# Patient Record
Sex: FEMALE | Race: WHITE | Employment: UNEMPLOYED | URBAN - METROPOLITAN AREA
[De-identification: names, ages, dates, MRNs, and addresses within clinical notes are randomized per-mention and may not be internally consistent; named-entity substitution may affect disease eponyms.]

---

## 2017-01-09 ENCOUNTER — PATIENT MESSAGE (OUTPATIENT)
Dept: INTERNAL MEDICINE CLINIC | Age: 47
End: 2017-01-09

## 2017-01-17 ENCOUNTER — TELEPHONE (OUTPATIENT)
Dept: INTERNAL MEDICINE CLINIC | Age: 47
End: 2017-01-17

## 2017-01-17 NOTE — TELEPHONE ENCOUNTER
Today our office received a request stating that fluticasone 50 mcg nasal spray needs a prior authorization. I called and spoke to rep who informed me that they prefer pt to use OTC fluticasone.

## 2017-01-18 DIAGNOSIS — G47.00 PERSISTENT DISORDER OF INITIATING OR MAINTAINING SLEEP: ICD-10-CM

## 2017-01-18 RX ORDER — ZOLPIDEM TARTRATE 10 MG/1
TABLET ORAL
Qty: 30 TAB | Refills: 0 | Status: SHIPPED | OUTPATIENT
Start: 2017-01-18 | End: 2017-01-27 | Stop reason: SDUPTHER

## 2017-01-18 RX ORDER — ACYCLOVIR 400 MG/1
400 TABLET ORAL
Qty: 30 TAB | Refills: 0 | Status: SHIPPED | OUTPATIENT
Start: 2017-01-18

## 2017-01-18 NOTE — TELEPHONE ENCOUNTER
Medication below has been phoned in to [facility]. zolpidem (AMBIEN) 10 mg tablet [[id number]]   Order Details   Dose, Route, Frequency: As Directed    Dispense Quantity:  30 Tab Refills:  0 Fills Remainin           Sig: Take 1 tab nightly as needed for sleep.  Do not drive after taking Ambien and take it only when you have at least 8 hours dedicated to sleep and rest after taking it.          Written Date:  17 Expiration Date:  --     Start Date:  17 End Date:  --            Ordering Provider:  -- JOON #:  -- NPI:  --    Authorizing Provider:  Radha Roger MD JOON #:  [id number] NPI:  [id number]    Diagnosis Association: Persistent disorder of initiating or maintaining sleep (G47.00)      Original Order:  zolpidem (AMBIEN) 10 mg tablet [[id number]]      Pharmacy:  [facility] [address] [place], [place] [address]vard #:  [id number]

## 2017-01-18 NOTE — TELEPHONE ENCOUNTER
Requested Prescriptions     Pending Prescriptions Disp Refills    acyclovir (ZOVIRAX) 400 mg tablet 30 Tab 0     Sig: Take 1 Tab by mouth five (5) times daily. Indications: as needed    zolpidem (AMBIEN) 10 mg tablet 30 Tab 0     Sig: Take 1 tab nightly as needed for sleep. Do not drive after taking Ambien and take it only when you have at least 8 hours dedicated to sleep and rest after taking it.      Ambien last filled 12/30/16  Acyclovir last filled 11/15/16

## 2017-01-18 NOTE — TELEPHONE ENCOUNTER
From: Steve Velazquez  To:  Jennifer Shabazz MD  Sent: 1/18/2017 3:36 AM EST  Subject: Medication Renewal Request    Original authorizing provider: MD Cinthia Lipscomb would like a refill of the following medications:  acyclovir (ZOVIRAX) 400 mg tablet Jennifer Shabazz MD]  zolpidem (AMBIEN) 10 mg tablet Jennifer Shabazz MD]    Preferred pharmacy: [address] [place] [address] Ravin Pal:

## 2017-01-23 RX ORDER — PREDNISONE 10 MG/1
TABLET ORAL
Qty: 21 TAB | Refills: 0 | Status: SHIPPED | OUTPATIENT
Start: 2017-01-23 | End: 2017-03-24

## 2017-01-27 DIAGNOSIS — G47.00 PERSISTENT DISORDER OF INITIATING OR MAINTAINING SLEEP: ICD-10-CM

## 2017-01-27 RX ORDER — ZOLPIDEM TARTRATE 10 MG/1
TABLET ORAL
Qty: 30 TAB | Refills: 0 | Status: SHIPPED | OUTPATIENT
Start: 2017-01-27 | End: 2017-03-24 | Stop reason: SDUPTHER

## 2017-01-27 NOTE — TELEPHONE ENCOUNTER
Medication below has been phoned in to [facility].     zolpidem (AMBIEN) 10 mg tablet [[id number]]   Order Details   Dose, Route, Frequency: As Directed    Dispense Quantity:  30 Tab Refills:  0 Fills Remainin           Sig: TAKE 1 TABLET BY MOUTH AT BEDTIME AS NEEDED FOR SLEEP          Written Date:  17 Expiration Date:  --     Start Date:  17 End Date:  --            Ordering Provider:  -- JOON #:  -- NPI:  --    Authorizing Provider:  Carlton Carranza MD JOON #:  [id number] NPI:  [id number]    Diagnosis Association: Persistent disorder of initiating or maintaining sleep (G47.00)      Original Order:  zolpidem (AMBIEN) 10 mg tablet [[id number]]      Pharmacy:  [facility] [address] [place] [address] Keshav #:  [id number]

## 2017-01-30 RX ORDER — RIZATRIPTAN BENZOATE 10 MG/1
10 TABLET ORAL
Qty: 4 TAB | Refills: 5 | Status: SHIPPED | OUTPATIENT
Start: 2017-01-30 | End: 2017-03-24

## 2017-01-30 NOTE — TELEPHONE ENCOUNTER
From: Cathy Chawla  To: Lindsey Escalante MD  Sent: 1/28/2017 12:01 AM EST  Subject: Medication Renewal Request    Original authorizing provider: MD Cathy Rice would like a refill of the following medications:  rizatriptan (MAXALT) 10 mg tablet Lindsey Escalante MD]    Preferred pharmacy: [address] [place] [address], [address] [place] [address] Parvez Angella  AT [address] [place] [address]    Comment:      Medication renewals requested in this message routed to other providers:  lorcaserin (BELVIQ) 10 mg tab Tammy Dickerson MD]  topiramate (TOPAMAX) 50 mg tablet Tammy Dickerson MD]  butalbital-acetaminophen-caffeine (FIORICET, ESGIC) -40 mg per tablet Tammy Dickerson MD]  traMADol Jenita Colchester) 50 mg tablet Tammy Dickerson MD]

## 2017-01-31 NOTE — TELEPHONE ENCOUNTER
From: Steve Velazquez  To:  Jennifer Shabazz MD  Sent: 1/30/2017 7:59 PM EST  Subject: Medication Renewal Request    Original authorizing provider: MD Cinthia Lipscomb would like a refill of the following medications:  traMADol (ULTRAM) 50 mg tablet Jennifer Shabazz MD]  diazePAM (VALIUM) 5 mg tablet Jennifer Shabazz MD]    Preferred pharmacy: [facility] Ravin Pal:

## 2017-01-31 NOTE — TELEPHONE ENCOUNTER
Requested Prescriptions     Pending Prescriptions Disp Refills    traMADol (ULTRAM) 50 mg tablet 60 Tab 3     Sig: Take 1 Tab by mouth every eight (8) hours as needed for Pain.  Max Daily Amount: 150 mg.    diazePAM (VALIUM) 5 mg tablet 60 Tab 0     Last OV-1/5/16  Next OV-n/s pt notified to make appt  Med refilled-10/11/16 , 12/6/16

## 2017-02-01 ENCOUNTER — DOCUMENTATION ONLY (OUTPATIENT)
Dept: INTERNAL MEDICINE CLINIC | Age: 47
End: 2017-02-01

## 2017-02-01 RX ORDER — DIAZEPAM 5 MG/1
5 TABLET ORAL
Qty: 60 TAB | Refills: 0 | OUTPATIENT
Start: 2017-02-01

## 2017-02-01 RX ORDER — TRAMADOL HYDROCHLORIDE 50 MG/1
50 TABLET ORAL
Qty: 60 TAB | Refills: 3 | OUTPATIENT
Start: 2017-02-01

## 2017-02-01 NOTE — PROGRESS NOTES
Patient only contacts us for refills of controlled substances. Last seen in Jan 2016, no future appt scheduled. On review of connect care, I see that since 2013 she has had 5 no-shows and numerous cancellations. The tally is much higher when including ancillary services and other specialists within [facility]. We will send termination letter.

## 2017-02-09 RX ORDER — LEVOTHYROXINE SODIUM 88 UG/1
TABLET ORAL
Qty: 30 TAB | Refills: 0 | Status: SHIPPED | OUTPATIENT
Start: 2017-02-09 | End: 2017-04-28 | Stop reason: SDUPTHER

## 2017-02-13 ENCOUNTER — TELEPHONE (OUTPATIENT)
Dept: INTERNAL MEDICINE CLINIC | Age: 47
End: 2017-02-13

## 2017-02-13 RX ORDER — TRAMADOL HYDROCHLORIDE 50 MG/1
50 TABLET ORAL
Qty: 30 TAB | Refills: 0 | Status: SHIPPED | OUTPATIENT
Start: 2017-02-13 | End: 2017-03-24 | Stop reason: SDUPTHER

## 2017-02-13 NOTE — TELEPHONE ENCOUNTER
Patient recently received Termination letter needs to be advised on the status of her refill request from 1/30/17 for her Tramadol. Please call to advise.  Thank you

## 2017-02-13 NOTE — TELEPHONE ENCOUNTER
Medication below has been phoned in to [facility]. traMADol (ULTRAM) 50 mg tablet [[id number]]   Order Details   Dose: 50 mg Route: Oral Frequency: EVERY 8 HOURS AS NEEDED for Pain   Dispense Quantity:  30 Tab Refills:  0 Fills Remainin           Sig: Take 1 Tab by mouth every eight (8) hours as needed for Pain.  Max Daily Amount: 150 mg.          Written Date:  17 Expiration Date:  --     Start Date:  17 End Date:  --            Ordering Provider:  -- JOON #:  -- NPI:  --    Authorizing Provider:  Juancarlos Charlton MD JOON #:  [id number] NPI:  [id number]       Original Order:  traMADol Ute Poag) 50 mg tablet [[id number]]      Pharmacy:  [facility] [address] [place] [address] #:  [id number]     Pharmacy Comments:  --

## 2017-02-28 RX ORDER — DULOXETIN HYDROCHLORIDE 30 MG/1
CAPSULE, DELAYED RELEASE ORAL
Qty: 90 CAP | Refills: 0 | Status: SHIPPED | OUTPATIENT
Start: 2017-02-28 | End: 2017-03-24 | Stop reason: SDUPTHER

## 2017-03-24 ENCOUNTER — OFFICE VISIT (OUTPATIENT)
Dept: INTERNAL MEDICINE CLINIC | Age: 47
End: 2017-03-24

## 2017-03-24 VITALS
SYSTOLIC BLOOD PRESSURE: 120 MMHG | DIASTOLIC BLOOD PRESSURE: 88 MMHG | BODY MASS INDEX: 44.41 KG/M2 | HEIGHT: 68 IN | HEART RATE: 80 BPM | RESPIRATION RATE: 18 BRPM | TEMPERATURE: 98.6 F | WEIGHT: 293 LBS

## 2017-03-24 DIAGNOSIS — E03.4 HYPOTHYROIDISM DUE TO ACQUIRED ATROPHY OF THYROID: ICD-10-CM

## 2017-03-24 DIAGNOSIS — F13.20 BENZODIAZEPINE DEPENDENCE (HCC): ICD-10-CM

## 2017-03-24 DIAGNOSIS — E66.01 MORBID OBESITY DUE TO EXCESS CALORIES (HCC): ICD-10-CM

## 2017-03-24 DIAGNOSIS — G89.29 CHRONIC LOW BACK PAIN WITHOUT SCIATICA, UNSPECIFIED BACK PAIN LATERALITY: ICD-10-CM

## 2017-03-24 DIAGNOSIS — M79.7 FIBROMYALGIA: Primary | ICD-10-CM

## 2017-03-24 DIAGNOSIS — E55.9 VITAMIN D DEFICIENCY: ICD-10-CM

## 2017-03-24 DIAGNOSIS — G47.00 PERSISTENT DISORDER OF INITIATING OR MAINTAINING SLEEP: ICD-10-CM

## 2017-03-24 DIAGNOSIS — Z00.00 WELL ADULT EXAM: ICD-10-CM

## 2017-03-24 DIAGNOSIS — Z79.899 CONTROLLED SUBSTANCE AGREEMENT SIGNED: ICD-10-CM

## 2017-03-24 DIAGNOSIS — M54.50 CHRONIC LOW BACK PAIN WITHOUT SCIATICA, UNSPECIFIED BACK PAIN LATERALITY: ICD-10-CM

## 2017-03-24 DIAGNOSIS — F32.1 MODERATE SINGLE CURRENT EPISODE OF MAJOR DEPRESSIVE DISORDER (HCC): ICD-10-CM

## 2017-03-24 RX ORDER — ZOLPIDEM TARTRATE 10 MG/1
TABLET ORAL
Qty: 30 TAB | Refills: 0 | Status: SHIPPED | OUTPATIENT
Start: 2017-03-24 | End: 2017-04-23 | Stop reason: SDUPTHER

## 2017-03-24 RX ORDER — DULOXETIN HYDROCHLORIDE 60 MG/1
60 CAPSULE, DELAYED RELEASE ORAL DAILY
Qty: 30 CAP | Refills: 3
Start: 2017-03-24

## 2017-03-24 RX ORDER — TRAMADOL HYDROCHLORIDE 50 MG/1
50 TABLET ORAL
Qty: 90 TAB | Refills: 0 | Status: SHIPPED | OUTPATIENT
Start: 2017-03-24 | End: 2017-04-23 | Stop reason: SDUPTHER

## 2017-03-24 RX ORDER — DIAZEPAM 2 MG/1
5 TABLET ORAL
Qty: 60 TAB | Refills: 0 | Status: SHIPPED | OUTPATIENT
Start: 2017-03-24 | End: 2017-04-23 | Stop reason: SDUPTHER

## 2017-03-24 RX ORDER — DULOXETIN HYDROCHLORIDE 30 MG/1
30 CAPSULE, DELAYED RELEASE ORAL DAILY
Qty: 90 CAP | Refills: 0
Start: 2017-03-24 | End: 2020-02-03

## 2017-03-24 NOTE — MR AVS SNAPSHOT
Visit Information Date & Time Provider Department Dept. Phone Encounter #  
 3/24/2017 10:15 AM Shyla Aparicio MD [place] [address] [facility] [phone] [id number] Follow-up Instructions Return in about 4 months (around 7/24/2017) for F/U fibromyalgia. Upcoming Health Maintenance Date Due DTaP/Tdap/Td series (1 - Tdap) 12/8/1993 Allergies as of 3/24/2017  Review Complete On: 3/24/2017 By: Shyla Aparicio MD  
  
 Severity Noted Reaction Type Reactions Augmentin [Amoxicillin-pot Clavulanate]  03/24/2010    Nausea and Vomiting Avelox [Moxifloxacin]  03/24/2010    Other (comments) \"Gave her really bad thrush\" Percocet [Oxycodone-acetaminophen]  03/24/2010    Other (comments) \"Severe Migraines\" Current Immunizations  Never Reviewed Name Date Influenza Vaccine 9/2/2015, 10/29/2014 TB Skin Test (PPD) Intradermal 8/8/2016 Not reviewed this visit You Were Diagnosed With   
  
 Codes Comments Well adult exam    -  Primary ICD-10-CM: Z00.00 ICD-9-CM: V70.0 Hypothyroidism due to acquired atrophy of thyroid     ICD-10-CM: E03.4 ICD-9-CM: 244.8, 246.8 Moderate single current episode of major depressive disorder (HCC)     ICD-10-CM: F32.1 ICD-9-CM: 296.22 Fibromyalgia     ICD-10-CM: M79.7 ICD-9-CM: 729.1 Vitamin D deficiency     ICD-10-CM: E55.9 ICD-9-CM: 268.9 Chronic low back pain without sciatica, unspecified back pain laterality     ICD-10-CM: M54.5, G89.29 ICD-9-CM: 724.2, 338.29 Morbid obesity due to excess calories (HCC)     ICD-10-CM: E66.01 
ICD-9-CM: 278.01 Vitals BP Pulse Temp Resp Height(growth percentile) Weight(growth percentile) 120/88 (BP 1 Location: Left arm, BP Patient Position: Sitting) 80 98.6 °F (37 °C) (Oral) 18 5' 8\" (1.727 m) 297 lb 12.8 oz (135.1 kg) BMI OB Status Smoking Status 45.28 kg/m2 Hysterectomy Former Smoker Vitals History BMI and BSA Data Body Mass Index Body Surface Area  
 45.28 kg/m 2 2.55 m 2 Preferred Pharmacy Pharmacy Name Phone [facility]ath[facility] Millard [address], [address] [facility] [phone] Your Updated Medication List  
  
   
This list is accurate as of: 3/24/17 10:58 AM.  Always use your most recent med list.  
  
  
  
  
 acyclovir 400 mg tablet Commonly known as:  ZOVIRAX Take 1 Tab by mouth five (5) times daily. Indications: as needed * DULoxetine 30 mg capsule Commonly known as:  CYMBALTA Take 1 Cap by mouth daily. * DULoxetine 60 mg capsule Commonly known as:  CYMBALTA Take 1 Cap by mouth daily. ergocalciferol 50,000 unit capsule Commonly known as:  ERGOCALCIFEROL Take 1 Cap by mouth every seven (7) days. esomeprazole 20 mg capsule Commonly known as:  NexIUM Take 1 Cap by mouth daily. fluticasone 50 mcg/actuation nasal spray Commonly known as:  Trish McNairy 2 Sprays by Both Nostrils route daily. furosemide 20 mg tablet Commonly known as:  LASIX Take 1 Tab by mouth daily as needed. Fluid overload/edema  
  
 levothyroxine 88 mcg tablet Commonly known as:  SYNTHROID  
TAKE 1 TABLET BY MOUTH ONCE DAILY BEFORE BREAKFAST  
  
 rizatriptan 10 mg tablet Commonly known as:  Britta Poot Take 1 Tab by mouth once as needed. May repeat in 2 hours if needed  
  
 traMADol 50 mg tablet Commonly known as:  ULTRAM  
Take 1 Tab by mouth every eight (8) hours as needed for Pain. Max Daily Amount: 150 mg.  
  
 zolpidem 10 mg tablet Commonly known as:  AMBIEN  
TAKE 1 TABLET BY MOUTH AT BEDTIME AS NEEDED FOR SLEEP * Notice: This list has 2 medication(s) that are the same as other medications prescribed for you. Read the directions carefully, and ask your doctor or other care provider to review them with you. We Performed the Following CBC WITH AUTOMATED DIFF [31976 CPT(R)] HIV 1/2 AG/AB, 4TH GENERATION,W RFLX CONFIRM [QDH70526 Custom] LIPID PANEL [56069 CPT(R)] METABOLIC PANEL, COMPREHENSIVE [56796 CPT(R)] TSH REFLEX TO T4 [[id number] Custom] URINALYSIS W/ RFLX MICROSCOPIC [57409 CPT(R)] Follow-up Instructions Return in about 4 months (around 7/24/2017) for F/U fibromyalgia. Patient Instructions Follow a calorie controlled Mediterranean diet. Exercise for at least 30 minutes daily. You should try to get off of Ambien and tramadol. Safer alternatives are recommended. Have fasting blood work analysis. Mediterranean Diet: Care Instructions Your Care Instructions The Mediterranean diet features foods eaten in [place], [place], [place] and [place], and other countries that border the [facility]. It emphasizes eating a diet rich in fruits, vegetables, nuts, and high-fiber grains, and limits meat, cheese, and sweets. The Mediterranean diet may: · Prevent heart disease and lower the risk of a heart attack or stroke. · Prevent type 2 diabetes. · Prevent Alzheimer's disease and other dementia. · Prevent depression. · Prevent Parkinson's disease. This diet contains more fat than other heart-healthy diets. But the fats are mainly from nuts, unsaturated oils, such as fish oils, olive oil, and certain nut or seed oils (such as canola, soybean, or flaxseed oil). These types of oils may help protect the heart and blood vessels. Follow-up care is a key part of your treatment and safety. Be sure to make and go to all appointments, and call your doctor if you are having problems. It's also a good idea to know your test results and keep a list of the medicines you take. How can you care for yourself at home? What to eat · Eat a variety of fruits and vegetables each day, such as grapes, blueberries, tomatoes, broccoli, peppers, figs, olives, spinach, eggplant, beans, lentils, and chickpeas. · Eat a variety of whole-grain foods each day, such as oats, brown rice, and whole wheat bread, pasta, and couscous. · Eat fish at least 2 times a week. Try tuna, salmon, mackerel, lake trout, herring, or sardines. · Eat moderate amounts of low-fat dairy products each day or weekly, such as milk, cheese, or yogurt. · Eat moderate amounts of poultry and eggs every 2 days or weekly. · Choose healthy (unsaturated) fats, such as nuts, olive oil and certain nut or seed oils like canola, soybean, and flaxseed. · Limit unhealthy (saturated) fats, such as butter, palm oil, and coconut oil. And limit fats found in animal products, such as meat and dairy products made with whole milk. Try to eat red meat only a few times a month in very small amounts. · Limit sweets and desserts to only a few times a week. This includes sugar-sweetened drinks like soda. The Mediterranean diet may also include red wine with your meal1 glass each day for women and up to 2 glasses a day for men. Tips for changing your diet · Dip bread in a mix of olive oil and fresh herbs instead of using butter. · Add avocado slices to your sandwich instead of simpson. · Have fish for lunch or dinner instead of red meat. Brush the fish with olive oil, and broil or grill it. · Sprinkle your salad with seeds or nuts instead of cheese. · Cook with olive or canola oil instead of butter or oils that are high in saturated fat. · Switch from 2% milk or whole milk to 1% or fat-free milk. · Dip raw vegetables in a vinaigrette dressing or hummus instead of dips made from mayonnaise or sour cream. 
· Have a piece of fruit for dessert instead of a piece of cake. Try baked apples, or have some dried fruit. Part of the Mediterranean diet is being active. Get at least 30 minutes of exercise on most days of the week. Walking is a good choice. You also may want to do other activities, such as running, swimming, cycling, or playing tennis or team sports. Where can you learn more? Go to http://erick-manjula.info/. Enter O407 in the search box to learn more about \"Mediterranean Diet: Care Instructions. \" Current as of: July 26, 2016 Content Version: 11.1 © 4992-4225 [employer]. Care instructions adapted under license by [employer] (which disclaims liability or warranty for this information). If you have questions about a medical condition or this instruction, always ask your healthcare professional. [address] any warranty or liability for your use of this information. Introducing [facility] & HEALTH SERVICES! Dear Esau Amos: Thank you for requesting a [email or web address] account. Our records indicate that you have previously registered for a [email or web address] account but its currently inactive. Please call our [email or web address] support line at [phone]. Additional Information If you have questions, please visit the Frequently Asked Questions section of the [email or web address] website at [email or web address] Remember, [email or web address] is NOT to be used for urgent needs. For medical emergencies, dial 911. Now available from your iPhone and Android! Please provide this summary of care documentation to your next provider. Your primary care clinician is listed as [place]. If you have any questions after today's visit, please call [phone].

## 2017-03-24 NOTE — PROGRESS NOTES
Chief Complaint   Patient presents with    New Patient     Reviewed record in preparation for visit and have obtained necessary documentation. Identified pt with two pt identifiers(name and ). Health Maintenance Due   Topic    DTaP/Tdap/Td series (1 - Tdap)    INFLUENZA AGE 9 TO ADULT          Chief Complaint   Patient presents with    New Patient        Wt Readings from Last 3 Encounters:   17 297 lb 12.8 oz (135.1 kg)   16 291 lb (132 kg)   14 280 lb 3.2 oz (127.1 kg)     Temp Readings from Last 3 Encounters:   17 98.6 °F (37 °C) (Oral)   16 98.1 °F (36.7 °C) (Oral)   14 97.6 °F (36.4 °C) (Oral)     BP Readings from Last 3 Encounters:   17 120/88   16 118/85   14 111/56     Pulse Readings from Last 3 Encounters:   17 80   16 75   14 69           Learning Assessment:  :     Learning Assessment 3/24/2017 6/3/2014   PRIMARY LEARNER Patient Patient   HIGHEST LEVEL OF EDUCATION - PRIMARY LEARNER  SOME COLLEGE SOME COLLEGE   BARRIERS PRIMARY LEARNER NONE NONE   CO-LEARNER CAREGIVER No No   PRIMARY LANGUAGE ENGLISH ENGLISH   LEARNER PREFERENCE PRIMARY DEMONSTRATION READING   ANSWERED BY patient patient   RELATIONSHIP SELF SELF       Depression Screening:  :     No flowsheet data found. Fall Risk Assessment:  :     No flowsheet data found. Abuse Screening:  :     Abuse Screening Questionnaire 3/24/2017 6/3/2014   Do you ever feel afraid of your partner? N N   Are you in a relationship with someone who physically or mentally threatens you? N N   Is it safe for you to go home?  Y Y       Coordination of Care Questionnaire:  :     1) Have you been to an emergency room, urgent care clinic since your last visit? no   Hospitalized since your last visit? no             2) Have you seen or consulted any other health care providers outside of [address] [place] [address] since your last visit? no  (Include any pap smears or colon screenings in this section.)    3) Do you have an Advance Directive on file? no    4) Are you interested in receiving information on Advance Directives? NO      Patient is accompanied by self I have received verbal consent from [id number] [facility] to discuss any/all medical information while they are present in the room. Reviewed record  In preparation for visit and have obtained necessary documentation.

## 2017-03-24 NOTE — PROGRESS NOTES
Subjective:     Chief Complaint   Patient presents with    New Patient     She  is a 42y.o. year old female who presents for evaluation. 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 Has chronic pain and fibromyalgia. Developing neuropathy in left great toe. Has chronic sinusitis and chronic migraines. Has depression and anxiety. Admitted to [facility] after trying to OD. Uses tramadol and Ambien chronically. Has had fullness in her right ear. FH:  MGM with CAD, MGF with DM II with renal disease. Historical Data    Past Medical History:   Diagnosis Date    Aortic aneurysm (thoracic)     Chronic knee pain 3/24/2010    Chronic low back pain 3/24/2010    Chronic pain     Depression 3/24/2010    Fibromyalgia 11/24/2014    Genital herpes 3/24/2010    GERD (gastroesophageal reflux disease) 3/24/2010    H/O endoscopic retrograde cholangiopancreatography [id number]    Hyperlipidemia 3/24/2010    Hypothyroidism 3/24/2010    Migraines 3/24/2010    Obesity 3/24/2010    Persistent disorder of initiating or maintaining sleep 3/24/2010    Plantar fasciitis 3/24/2010        Past Surgical History:   Procedure Laterality Date   Ryan Adamson CHOLECYSTECTOMY  [id number]    HX CHOLECYSTECTOMY  8/2013    emergency cholectomy   Ryan Adamson GYN  [id number]    hysterectomy,endometrial ablation        Outpatient Encounter Prescriptions as of 3/24/2017   Medication Sig Dispense Refill    DULoxetine (CYMBALTA) 30 mg capsule TAKE ONE CAPSULE BY MOUTH EVERY DAY 90 Cap 0    traMADol (ULTRAM) 50 mg tablet Take 1 Tab by mouth every eight (8) hours as needed for Pain. Max Daily Amount: 150 mg. 30 Tab 0    levothyroxine (SYNTHROID) 88 mcg tablet TAKE 1 TABLET BY MOUTH ONCE DAILY BEFORE BREAKFAST 30 Tab 0    rizatriptan (MAXALT) 10 mg tablet Take 1 Tab by mouth once as needed for Migraine for up to 1 dose.  May repeat in 2 hours if needed 4 Tab 5    zolpidem (AMBIEN) 10 mg tablet TAKE 1 TABLET BY MOUTH AT BEDTIME AS NEEDED FOR SLEEP 30 Tab 0    predniSONE (STERAPRED DS) 10 mg dose pack See administration instruction per 10mg dose pack 21 Tab 0    acyclovir (ZOVIRAX) 400 mg tablet Take 1 Tab by mouth five (5) times daily. Indications: as needed 30 Tab 0    diazePAM (VALIUM) 5 mg tablet TAKE 1 TABLET BY MOUTH EVERY 12 HOURS AS NEEDED FOR ANXIETY 60 Tab 0    fluticasone (FLONASE) 50 mcg/actuation nasal spray 2 Sprays by Both Nostrils route daily. 1 Bottle 5    butalbital-acetaminophen-caffeine (FIORICET, ESGIC) -40 mg per tablet Take 1 Tab by mouth every six (6) hours as needed for Pain. Max Daily Amount: 4 Tabs. 30 Tab 2    furosemide (LASIX) 20 mg tablet Take 1 Tab by mouth daily as needed. Fluid overload/edema 30 Tab 5    rizatriptan (MAXALT) 10 mg tablet Take 1 Tab by mouth once as needed. May repeat in 2 hours if needed 12 Tab 6    cyclobenzaprine (FLEXERIL) 10 mg tablet Take 1 Tab by mouth three (3) times daily as needed for Muscle Spasm(s). 30 Tab 5    topiramate (TOPAMAX) 50 mg tablet Take 1 Tab by mouth two (2) times a day. 60 Tab 5    ergocalciferol (ERGOCALCIFEROL) 50,000 unit capsule Take 1 Cap by mouth every seven (7) days. 4 Cap 11    lorcaserin (BELVIQ) 10 mg tab Take 1 Each by mouth two (2) times a day. 60 Tab 0    esomeprazole (NEXIUM) 20 mg capsule Take 1 Cap by mouth daily. 30 Cap 5    levofloxacin (LEVAQUIN) 750 mg tablet Take 750 mg by mouth daily. No facility-administered encounter medications on file as of 3/24/2017. Allergies   Allergen Reactions    Augmentin [Amoxicillin-Pot Clavulanate] Nausea and Vomiting    Avelox [Moxifloxacin] Other (comments)     \"Gave her really bad thrush\"    Percocet [Oxycodone-Acetaminophen] Other (comments)     \"Severe Migraines\"        Social History     Social History    Marital status:      Spouse name: N/A    Number of children: N/A    Years of education: N/A     Occupational History    Not on file. Social History Main Topics    Smoking status: Former Smoker    Smokeless tobacco: Never Used      Comment: quit 9 yrs ago    Alcohol use No      Comment: rarely    Drug use: No    Sexual activity: Yes     Partners: Male     Birth control/ protection: None     Other Topics Concern    Not on file     Social History Narrative      Review of Systems  A comprehensive review of systems was negative except for: Constitutional: positive for malaise  Musculoskeletal: positive for myalgias, arthralgias and back pain  Neurological: positive for headaches  Behvioral/Psych: positive for anxiety, depression and obesity    Objective:     Vitals:    03/24/17 1019   BP: 120/88   Pulse: 80   Resp: 18   Temp: 98.6 °F (37 °C)   TempSrc: Oral   Weight: 297 lb 12.8 oz (135.1 kg)   Height: 5' 8\" (1.727 m)     Pleasant WF in no acute distress. Neck:  Supple. No masses. Cardiac: RRR without murmurs, gallops or rubs. Lungs: Clear to auscultation. Abdomen: Benign  Neuro:  No focal motor deficits. ASSESSMENT / PLAN:   1. Fibromyalgia  · Continue current therapy. · Hope to simplify medications in the future. - DULoxetine (CYMBALTA) 30 mg capsule; Take 1 Cap by mouth daily. Dispense: 90 Cap; Refill: 0  - DULoxetine (CYMBALTA) 60 mg capsule; Take 1 Cap by mouth daily. Dispense: 30 Cap; Refill: 3  - traMADol (ULTRAM) 50 mg tablet; Take 1 Tab by mouth every eight (8) hours as needed for Pain. Max Daily Amount: 150 mg. Dispense: 90 Tab; Refill: 0  - diazePAM (VALIUM) 2 mg tablet; Take 2.5 Tabs by mouth every twelve (12) hours as needed for Anxiety. Max Daily Amount: 10 mg. Dispense: 60 Tab; Refill: 0    2. Hypothyroidism due to acquired atrophy of thyroid    - TSH REFLEX TO T4    3. Moderate single current episode of major depressive disorder (HCC)    - DULoxetine (CYMBALTA) 30 mg capsule; Take 1 Cap by mouth daily. Dispense: 90 Cap; Refill: 0  - DULoxetine (CYMBALTA) 60 mg capsule; Take 1 Cap by mouth daily.   Dispense: 30 Cap; Refill: 3    4. Vitamin D deficiency      5. Chronic low back pain without sciatica, unspecified back pain laterality  · Transition to alternative medication if possible. - traMADol (ULTRAM) 50 mg tablet; Take 1 Tab by mouth every eight (8) hours as needed for Pain. Max Daily Amount: 150 mg. Dispense: 90 Tab; Refill: 0    6. Morbid obesity due to excess calories ([address]      7. Well adult exam    - CBC WITH AUTOMATED DIFF  - LIPID PANEL  - URINALYSIS W/ RFLX MICROSCOPIC  - METABOLIC PANEL, COMPREHENSIVE  - HIV 1/2 AG/AB, 4TH GENERATION,W RFLX CONFIRM    8. Persistent disorder of initiating or maintaining sleep    - zolpidem (AMBIEN) 10 mg tablet; TAKE 1 TABLET BY MOUTH AT BEDTIME AS NEEDED FOR SLEEP  Dispense: 30 Tab; Refill: 0  - diazePAM (VALIUM) 2 mg tablet; Take 2.5 Tabs by mouth every twelve (12) hours as needed for Anxiety. Max Daily Amount: 10 mg. Dispense: 60 Tab; Refill: 0    9. Benzodiazepine dependence (HCC)  · Needs simplification of benzodiazepine regimen. 10. Controlled substance agreement signed      Patient Instructions   Follow a calorie controlled Mediterranean diet. Exercise for at least 30 minutes daily. You should try to get off of Ambien and tramadol. Safer alternatives are recommended. Have fasting blood work analysis. Mediterranean Diet: Care Instructions  Your Care Instructions  The Mediterranean diet features foods eaten in [place], [place], [place] and [place], and other countries that border the [facility]. It emphasizes eating a diet rich in fruits, vegetables, nuts, and high-fiber grains, and limits meat, cheese, and sweets. The Mediterranean diet may:  · Prevent heart disease and lower the risk of a heart attack or stroke. · Prevent type 2 diabetes. · Prevent Alzheimer's disease and other dementia. · Prevent depression. · Prevent Parkinson's disease. This diet contains more fat than other heart-healthy diets.  But the fats are mainly from nuts, unsaturated oils, such as fish oils, olive oil, and certain nut or seed oils (such as canola, soybean, or flaxseed oil). These types of oils may help protect the heart and blood vessels. Follow-up care is a key part of your treatment and safety. Be sure to make and go to all appointments, and call your doctor if you are having problems. It's also a good idea to know your test results and keep a list of the medicines you take. How can you care for yourself at home? What to eat  · Eat a variety of fruits and vegetables each day, such as grapes, blueberries, tomatoes, broccoli, peppers, figs, olives, spinach, eggplant, beans, lentils, and chickpeas. · Eat a variety of whole-grain foods each day, such as oats, brown rice, and whole wheat bread, pasta, and couscous. · Eat fish at least 2 times a week. Try tuna, salmon, mackerel, lake trout, herring, or sardines. · Eat moderate amounts of low-fat dairy products each day or weekly, such as milk, cheese, or yogurt. · Eat moderate amounts of poultry and eggs every 2 days or weekly. · Choose healthy (unsaturated) fats, such as nuts, olive oil and certain nut or seed oils like canola, soybean, and flaxseed. · Limit unhealthy (saturated) fats, such as butter, palm oil, and coconut oil. And limit fats found in animal products, such as meat and dairy products made with whole milk. Try to eat red meat only a few times a month in very small amounts. · Limit sweets and desserts to only a few times a week. This includes sugar-sweetened drinks like soda. The Mediterranean diet may also include red wine with your meal--1 glass each day for women and up to 2 glasses a day for men. Tips for changing your diet  · Dip bread in a mix of olive oil and fresh herbs instead of using butter. · Add avocado slices to your sandwich instead of simpson. · Have fish for lunch or dinner instead of red meat. Brush the fish with olive oil, and broil or grill it.   · Sprinkle your salad with seeds or nuts instead of cheese. · Cook with olive or canola oil instead of butter or oils that are high in saturated fat. · Switch from 2% milk or whole milk to 1% or fat-free milk. · Dip raw vegetables in a vinaigrette dressing or hummus instead of dips made from mayonnaise or sour cream.  · Have a piece of fruit for dessert instead of a piece of cake. Try baked apples, or have some dried fruit. Part of the Mediterranean diet is being active. Get at least 30 minutes of exercise on most days of the week. Walking is a good choice. You also may want to do other activities, such as running, swimming, cycling, or playing tennis or team sports. Where can you learn more? Go to http://erick-manjula.info/. Enter O407 in the search box to learn more about \"Mediterranean Diet: Care Instructions. \"  Current as of: July 26, 2016  Content Version: 11.1  © 1537-1117 [employer]. Care instructions adapted under license by [employer] (which disclaims liability or warranty for this information). If you have questions about a medical condition or this instruction, always ask your healthcare professional. [place] [address] any warranty or liability for your use of this information. Follow-up Disposition:  Return in about 4 months (around 7/24/2017) for F/U fibromyalgia. Advised her to call back or return to office if symptoms worsen/change/persist.  Discussed expected course/resolution/complications of diagnosis in detail with patient. Medication risks/benefits/costs/interactions/alternatives discussed with patient. She was given an after visit summary which includes diagnoses, current medications, & vitals. She expressed understanding with the diagnosis and plan.

## 2017-03-24 NOTE — PATIENT INSTRUCTIONS
Follow a calorie controlled Mediterranean diet. Exercise for at least 30 minutes daily. You should try to get off of Ambien and tramadol. Safer alternatives are recommended. Have fasting blood work analysis. Mediterranean Diet: Care Instructions  Your Care Instructions  The Mediterranean diet features foods eaten in [place], [place], [place] and [place], and other countries that border the [facility]. It emphasizes eating a diet rich in fruits, vegetables, nuts, and high-fiber grains, and limits meat, cheese, and sweets. The Mediterranean diet may:  · Prevent heart disease and lower the risk of a heart attack or stroke. · Prevent type 2 diabetes. · Prevent Alzheimer's disease and other dementia. · Prevent depression. · Prevent Parkinson's disease. This diet contains more fat than other heart-healthy diets. But the fats are mainly from nuts, unsaturated oils, such as fish oils, olive oil, and certain nut or seed oils (such as canola, soybean, or flaxseed oil). These types of oils may help protect the heart and blood vessels. Follow-up care is a key part of your treatment and safety. Be sure to make and go to all appointments, and call your doctor if you are having problems. It's also a good idea to know your test results and keep a list of the medicines you take. How can you care for yourself at home? What to eat  · Eat a variety of fruits and vegetables each day, such as grapes, blueberries, tomatoes, broccoli, peppers, figs, olives, spinach, eggplant, beans, lentils, and chickpeas. · Eat a variety of whole-grain foods each day, such as oats, brown rice, and whole wheat bread, pasta, and couscous. · Eat fish at least 2 times a week. Try tuna, salmon, mackerel, lake trout, herring, or sardines. · Eat moderate amounts of low-fat dairy products each day or weekly, such as milk, cheese, or yogurt. · Eat moderate amounts of poultry and eggs every 2 days or weekly.   · Choose healthy (unsaturated) fats, such as nuts, olive oil and certain nut or seed oils like canola, soybean, and flaxseed. · Limit unhealthy (saturated) fats, such as butter, palm oil, and coconut oil. And limit fats found in animal products, such as meat and dairy products made with whole milk. Try to eat red meat only a few times a month in very small amounts. · Limit sweets and desserts to only a few times a week. This includes sugar-sweetened drinks like soda. The Mediterranean diet may also include red wine with your meal--1 glass each day for women and up to 2 glasses a day for men. Tips for changing your diet  · Dip bread in a mix of olive oil and fresh herbs instead of using butter. · Add avocado slices to your sandwich instead of simpson. · Have fish for lunch or dinner instead of red meat. Brush the fish with olive oil, and broil or grill it. · Sprinkle your salad with seeds or nuts instead of cheese. · Cook with olive or canola oil instead of butter or oils that are high in saturated fat. · Switch from 2% milk or whole milk to 1% or fat-free milk. · Dip raw vegetables in a vinaigrette dressing or hummus instead of dips made from mayonnaise or sour cream.  · Have a piece of fruit for dessert instead of a piece of cake. Try baked apples, or have some dried fruit. Part of the Mediterranean diet is being active. Get at least 30 minutes of exercise on most days of the week. Walking is a good choice. You also may want to do other activities, such as running, swimming, cycling, or playing tennis or team sports. Where can you learn more? Go to http://erick-manjula.info/. Enter O407 in the search box to learn more about \"Mediterranean Diet: Care Instructions. \"  Current as of: July 26, 2016  Content Version: 11.1  © 1856-3745 [employer]. Care instructions adapted under license by [employer] (which disclaims liability or warranty for this information).  If you have questions about a medical condition or this instruction, always ask your healthcare professional. [place] [address] any warranty or liability for your use of this information.

## 2017-03-25 LAB
ALBUMIN SERPL-MCNC: 4.1 G/DL (ref 3.5–5.5)
ALBUMIN/GLOB SERPL: 1.7 {RATIO} (ref 1.2–2.2)
ALP SERPL-CCNC: 67 IU/L (ref 39–117)
ALT SERPL-CCNC: 12 IU/L (ref 0–32)
APPEARANCE UR: CLEAR
AST SERPL-CCNC: 14 IU/L (ref 0–40)
BACTERIA #/AREA URNS HPF: NORMAL /[HPF]
BASOPHILS # BLD AUTO: 0 X10E3/UL (ref 0–0.2)
BASOPHILS NFR BLD AUTO: 1 %
BILIRUB SERPL-MCNC: 0.3 MG/DL (ref 0–1.2)
BILIRUB UR QL STRIP: NEGATIVE
BUN SERPL-MCNC: 13 MG/DL (ref 6–24)
BUN/CREAT SERPL: 14 (ref 9–23)
CALCIUM SERPL-MCNC: 9.1 MG/DL (ref 8.7–10.2)
CASTS URNS QL MICRO: NORMAL /LPF
CHLORIDE SERPL-SCNC: 103 MMOL/L (ref 96–106)
CHOLEST SERPL-MCNC: 190 MG/DL (ref 100–199)
CO2 SERPL-SCNC: 25 MMOL/L (ref 18–29)
COLOR UR: YELLOW
CREAT SERPL-MCNC: 0.91 MG/DL (ref 0.57–1)
EOSINOPHIL # BLD AUTO: 0.3 X10E3/UL (ref 0–0.4)
EOSINOPHIL NFR BLD AUTO: 4 %
EPI CELLS #/AREA URNS HPF: NORMAL /HPF
ERYTHROCYTE [DISTWIDTH] IN BLOOD BY AUTOMATED COUNT: 15.6 % (ref 12.3–15.4)
GLOBULIN SER CALC-MCNC: 2.4 G/DL (ref 1.5–4.5)
GLUCOSE SERPL-MCNC: 94 MG/DL (ref 65–99)
GLUCOSE UR QL: NEGATIVE
HCT VFR BLD AUTO: 37.5 % (ref 34–46.6)
HDLC SERPL-MCNC: 49 MG/DL
HGB BLD-MCNC: 11.6 G/DL (ref 11.1–15.9)
HGB UR QL STRIP: NEGATIVE
HIV 1+2 AB+HIV1 P24 AG SERPL QL IA: NON REACTIVE
IMM GRANULOCYTES # BLD: 0 X10E3/UL (ref 0–0.1)
IMM GRANULOCYTES NFR BLD: 0 %
INTERPRETATION, 910389: NORMAL
KETONES UR QL STRIP: NEGATIVE
LDLC SERPL CALC-MCNC: 124 MG/DL (ref 0–99)
LEUKOCYTE ESTERASE UR QL STRIP: ABNORMAL
LYMPHOCYTES # BLD AUTO: 2.3 X10E3/UL (ref 0.7–3.1)
LYMPHOCYTES NFR BLD AUTO: 36 %
MCH RBC QN AUTO: 27.2 PG (ref 26.6–33)
MCHC RBC AUTO-ENTMCNC: 30.9 G/DL (ref 31.5–35.7)
MCV RBC AUTO: 88 FL (ref 79–97)
MICRO URNS: ABNORMAL
MONOCYTES # BLD AUTO: 0.3 X10E3/UL (ref 0.1–0.9)
MONOCYTES NFR BLD AUTO: 5 %
MUCOUS THREADS URNS QL MICRO: PRESENT
NEUTROPHILS # BLD AUTO: 3.5 X10E3/UL (ref 1.4–7)
NEUTROPHILS NFR BLD AUTO: 54 %
NITRITE UR QL STRIP: NEGATIVE
PH UR STRIP: 5.5 [PH] (ref 5–7.5)
PLATELET # BLD AUTO: 381 X10E3/UL (ref 150–379)
POTASSIUM SERPL-SCNC: 4.6 MMOL/L (ref 3.5–5.2)
PROT SERPL-MCNC: 6.5 G/DL (ref 6–8.5)
PROT UR QL STRIP: NEGATIVE
RBC # BLD AUTO: 4.26 X10E6/UL (ref 3.77–5.28)
RBC #/AREA URNS HPF: NORMAL /HPF
SODIUM SERPL-SCNC: 141 MMOL/L (ref 134–144)
SP GR UR: 1.03 (ref 1–1.03)
TRIGL SERPL-MCNC: 84 MG/DL (ref 0–149)
TSH SERPL DL<=0.005 MIU/L-ACNC: 3.19 UIU/ML (ref 0.45–4.5)
UROBILINOGEN UR STRIP-MCNC: 0.2 MG/DL (ref 0.2–1)
VLDLC SERPL CALC-MCNC: 17 MG/DL (ref 5–40)
WBC # BLD AUTO: 6.4 X10E3/UL (ref 3.4–10.8)
WBC #/AREA URNS HPF: NORMAL /HPF

## 2017-04-04 ENCOUNTER — OFFICE VISIT (OUTPATIENT)
Dept: INTERNAL MEDICINE CLINIC | Age: 47
End: 2017-04-04

## 2017-04-04 VITALS
SYSTOLIC BLOOD PRESSURE: 120 MMHG | OXYGEN SATURATION: 97 % | HEIGHT: 68 IN | TEMPERATURE: 98.4 F | DIASTOLIC BLOOD PRESSURE: 84 MMHG | WEIGHT: 292 LBS | HEART RATE: 74 BPM | BODY MASS INDEX: 44.25 KG/M2 | RESPIRATION RATE: 18 BRPM

## 2017-04-04 DIAGNOSIS — N30.00 ACUTE CYSTITIS WITHOUT HEMATURIA: ICD-10-CM

## 2017-04-04 DIAGNOSIS — R30.0 DYSURIA: ICD-10-CM

## 2017-04-04 DIAGNOSIS — R35.0 URINARY FREQUENCY: Primary | ICD-10-CM

## 2017-04-04 LAB
BILIRUB UR QL STRIP: NORMAL
GLUCOSE UR-MCNC: NEGATIVE MG/DL
KETONES P FAST UR STRIP-MCNC: NEGATIVE MG/DL
PH UR STRIP: 5.5 [PH] (ref 4.6–8)
PROT UR QL STRIP: NEGATIVE MG/DL
SP GR UR STRIP: 1.02 (ref 1–1.03)
UA UROBILINOGEN AMB POC: NORMAL (ref 0.2–1)
URINALYSIS CLARITY POC: CLEAR
URINALYSIS COLOR POC: YELLOW
URINE BLOOD POC: NEGATIVE
URINE LEUKOCYTES POC: NORMAL
URINE NITRITES POC: NEGATIVE

## 2017-04-04 RX ORDER — SULFAMETHOXAZOLE AND TRIMETHOPRIM 800; 160 MG/1; MG/1
1 TABLET ORAL 2 TIMES DAILY
Qty: 6 TAB | Refills: 0 | Status: SHIPPED | OUTPATIENT
Start: 2017-04-04 | End: 2017-04-07

## 2017-04-04 NOTE — PROGRESS NOTES
Chief Complaint   Patient presents with    Urinary Frequency     3 days    Urinary Burning     Reviewed record in preparation for visit and have obtained necessary documentation. Identified pt with two pt identifiers(name and ). There are no preventive care reminders to display for this patient. Chief Complaint   Patient presents with    Urinary Frequency     3 days    Urinary Burning        Wt Readings from Last 3 Encounters:   17 292 lb (132.5 kg)   17 297 lb 12.8 oz (135.1 kg)   16 291 lb (132 kg)     Temp Readings from Last 3 Encounters:   17 98.4 °F (36.9 °C) (Oral)   17 98.6 °F (37 °C) (Oral)   16 98.1 °F (36.7 °C) (Oral)     BP Readings from Last 3 Encounters:   17 120/84   17 120/88   16 118/85     Pulse Readings from Last 3 Encounters:   17 74   17 80   16 75           Learning Assessment:  :     Learning Assessment 3/24/2017 6/3/2014   PRIMARY LEARNER Patient Patient   HIGHEST LEVEL OF EDUCATION - PRIMARY LEARNER  SOME COLLEGE SOME COLLEGE   BARRIERS PRIMARY LEARNER NONE NONE   CO-LEARNER CAREGIVER No No   PRIMARY LANGUAGE ENGLISH ENGLISH   LEARNER PREFERENCE PRIMARY DEMONSTRATION READING   ANSWERED BY patient patient   RELATIONSHIP SELF SELF       Depression Screening:  :     No flowsheet data found. Fall Risk Assessment:  :     No flowsheet data found. Abuse Screening:  :     Abuse Screening Questionnaire 3/24/2017 6/3/2014   Do you ever feel afraid of your partner? N N   Are you in a relationship with someone who physically or mentally threatens you? N N   Is it safe for you to go home?  Y Y       Coordination of Care Questionnaire:  :     1) Have you been to an emergency room, urgent care clinic since your last visit? no   Hospitalized since your last visit? no             2) Have you seen or consulted any other health care providers outside of [address] [place] [address] since your last visit? no  (Include any pap smears or colon screenings in this section.)    3) Do you have an Advance Directive on file? yes    4) Are you interested in receiving information on Advance Directives? NO      Patient is accompanied by daughter I have received verbal consent from Katlyn Collins to discuss any/all medical information while they are present in the room. Reviewed record  In preparation for visit and have obtained necessary documentation.

## 2017-04-04 NOTE — PATIENT INSTRUCTIONS
Urinary Tract Infection in Women: Care Instructions  Your Care Instructions    A urinary tract infection, or UTI, is a general term for an infection anywhere between the kidneys and the urethra (where urine comes out). Most UTIs are bladder infections. They often cause pain or burning when you urinate. UTIs are caused by bacteria and can be cured with antibiotics. Be sure to complete your treatment so that the infection goes away. Follow-up care is a key part of your treatment and safety. Be sure to make and go to all appointments, and call your doctor if you are having problems. It's also a good idea to know your test results and keep a list of the medicines you take. How can you care for yourself at home? · Take your antibiotics as directed. Do not stop taking them just because you feel better. You need to take the full course of antibiotics. · Drink extra water and other fluids for the next day or two. This may help wash out the bacteria that are causing the infection. (If you have kidney, heart, or liver disease and have to limit fluids, talk with your doctor before you increase your fluid intake.)  · Avoid drinks that are carbonated or have caffeine. They can irritate the bladder. · Urinate often. Try to empty your bladder each time. · To relieve pain, take a hot bath or lay a heating pad set on low over your lower belly or genital area. Never go to sleep with a heating pad in place. To prevent UTIs  · Drink plenty of water each day. This helps you urinate often, which clears bacteria from your system. (If you have kidney, heart, or liver disease and have to limit fluids, talk with your doctor before you increase your fluid intake.)  · Urinate when you need to. · Urinate right after you have sex. · Change sanitary pads often. · Avoid douches, bubble baths, feminine hygiene sprays, and other feminine hygiene products that have deodorants.   · After going to the bathroom, wipe from front to back.  When should you call for help? Call your doctor now or seek immediate medical care if:  · Symptoms such as fever, chills, nausea, or vomiting get worse or appear for the first time. · You have new pain in your back just below your rib cage. This is called flank pain. · There is new blood or pus in your urine. · You have any problems with your antibiotic medicine. Watch closely for changes in your health, and be sure to contact your doctor if:  · You are not getting better after taking an antibiotic for 2 days. · Your symptoms go away but then come back. Where can you learn more? Go to http://erick-manjula.info/. Enter C121 in the search box to learn more about \"Urinary Tract Infection in Women: Care Instructions. \"  Current as of: November 28, 2016  Content Version: 11.2  © 3251-5823 [employer]. Care instructions adapted under license by [employer] (which disclaims liability or warranty for this information). If you have questions about a medical condition or this instruction, always ask your healthcare professional. [address] any warranty or liability for your use of this information.

## 2017-04-04 NOTE — PROGRESS NOTES
Subjective:     Emily Galindo is a 42 y.o. female who complains of dysuria, frequency, urgency, foul smelling urine, nausea for 3 days. She also reports fever yesterday and slight thin white discharge. Patient denies flank pain, vomiting. Patient does not have a history of recurrent UTI. Patient does not have a history of pyelonephritis. Patient Active Problem List   Diagnosis Code    Hypothyroidism E03.9    Depression F32.9    Persistent disorder of initiating or maintaining sleep G47.00    Migraines G43.909    GERD (gastroesophageal reflux disease) K21.9    Obesity E66.9    Hyperlipidemia E78.5    Chronic low back pain M54.5, G89.29    Genital herpes A60.00    Aortic aneurysm (thoracic)     Vitamin D deficiency E55.9    Fibromyalgia M79.7    Benzodiazepine dependence ([facility]) F13.20    Controlled substance agreement signed Z79.899     Patient Active Problem List    Diagnosis Date Noted    Benzodiazepine dependence ([facility] [address] 03/24/2017    Controlled substance agreement signed 03/24/2017    Fibromyalgia 11/24/2014    Vitamin D deficiency 10/28/2011    Hypothyroidism 03/24/2010    Depression 03/24/2010    Persistent disorder of initiating or maintaining sleep 03/24/2010    Migraines 03/24/2010    GERD (gastroesophageal reflux disease) 03/24/2010    Obesity 03/24/2010    Hyperlipidemia 03/24/2010    Chronic low back pain 03/24/2010    Genital herpes 03/24/2010    Aortic aneurysm (thoracic)      Current Outpatient Prescriptions   Medication Sig Dispense Refill    trimethoprim-sulfamethoxazole (BACTRIM DS, SEPTRA DS) 160-800 mg per tablet Take 1 Tab by mouth two (2) times a day for 3 days. 6 Tab 0    DULoxetine (CYMBALTA) 30 mg capsule Take 1 Cap by mouth daily. 90 Cap 0    DULoxetine (CYMBALTA) 60 mg capsule Take 1 Cap by mouth daily.  30 Cap 3    zolpidem (AMBIEN) 10 mg tablet TAKE 1 TABLET BY MOUTH AT BEDTIME AS NEEDED FOR SLEEP 30 Tab 0    traMADol (ULTRAM) 50 mg tablet Take 1 Tab by mouth every eight (8) hours as needed for Pain. Max Daily Amount: 150 mg. 90 Tab 0    diazePAM (VALIUM) 2 mg tablet Take 2.5 Tabs by mouth every twelve (12) hours as needed for Anxiety. Max Daily Amount: 10 mg. 60 Tab 0    levothyroxine (SYNTHROID) 88 mcg tablet TAKE 1 TABLET BY MOUTH ONCE DAILY BEFORE BREAKFAST 30 Tab 0    acyclovir (ZOVIRAX) 400 mg tablet Take 1 Tab by mouth five (5) times daily. Indications: as needed 30 Tab 0    fluticasone (FLONASE) 50 mcg/actuation nasal spray 2 Sprays by Both Nostrils route daily. 1 Bottle 5    furosemide (LASIX) 20 mg tablet Take 1 Tab by mouth daily as needed. Fluid overload/edema 30 Tab 5    ergocalciferol (ERGOCALCIFEROL) 50,000 unit capsule Take 1 Cap by mouth every seven (7) days. 4 Cap 11    esomeprazole (NEXIUM) 20 mg capsule Take 1 Cap by mouth daily. 30 Cap 5    rizatriptan (MAXALT) 10 mg tablet Take 1 Tab by mouth once as needed.  May repeat in 2 hours if needed 12 Tab 6     Allergies   Allergen Reactions    Augmentin [Amoxicillin-Pot Clavulanate] Nausea and Vomiting    Avelox [Moxifloxacin] Other (comments)     \"Gave her really bad thrush\"    Percocet [Oxycodone-Acetaminophen] Other (comments)     \"Severe Migraines\"     Past Medical History:   Diagnosis Date    Aortic aneurysm (thoracic)     Chronic knee pain 3/24/2010    Chronic low back pain 3/24/2010    Chronic pain     Depression 3/24/2010    Fibromyalgia 11/24/2014    Genital herpes 3/24/2010    GERD (gastroesophageal reflux disease) 3/24/2010    H/O endoscopic retrograde cholangiopancreatography [id number]    Hyperlipidemia 3/24/2010    Hypothyroidism 3/24/2010    Migraines 3/24/2010    Obesity 3/24/2010    Persistent disorder of initiating or maintaining sleep 3/24/2010    Plantar fasciitis 3/24/2010     Past Surgical History:   Procedure Laterality Date   [facility] CHOLECYSTECTOMY  [id number]   [facility] CHOLECYSTECTOMY  8/2013    emergency cholectomy   Debera Pancoast GYN  [phone] hysterectomy,endometrial ablation     No family history on file. Social History   Substance Use Topics    Smoking status: Former Smoker    Smokeless tobacco: Never Used      Comment: quit 9 yrs ago    Alcohol use No      Comment: rarely        Review of Systems  Pertinent items are noted in HPI. Objective:     Visit Vitals    /84 (BP 1 Location: Left arm, BP Patient Position: Sitting)    Pulse 74    Temp 98.4 °F (36.9 °C) (Oral)    Resp 18    Ht 5' 8\" (1.727 m)    Wt 292 lb (132.5 kg)    SpO2 97%    BMI 44.4 kg/m2     General:  alert, cooperative, no distress   Abdomen: soft, nontender, nondistended, no masses or organomegaly. Back:  CVA tenderness absent   :  defer exam     Laboratory:   Urine dipstick shows positive for leukocytes. Micro exam: not done. Assessment/Plan:     UTI, Acute cystitis     1. TMP/SMX  2. Maintain adequate hydration  3. May use OTC pyridium as desired, which will turn urine orange/red color  4. Follow up if symptoms not improving, and prn. ICD-10-CM ICD-9-CM    1. Urinary frequency R35.0 788.41 AMB POC URINALYSIS DIP STICK AUTO W/O MICRO      CULTURE, URINE      CANCELED: AMB POC URINALYSIS DIP STICK AUTO W/O MICRO   2. Dysuria R30.0 788. 1 CULTURE, URINE   3. Acute cystitis without hematuria N30.00 595.0      Orders Placed This Encounter    CULTURE, URINE    AMB POC URINALYSIS DIP STICK AUTO W/O MICRO    trimethoprim-sulfamethoxazole (BACTRIM DS, SEPTRA DS) 160-800 mg per tablet   .

## 2017-04-04 NOTE — MR AVS SNAPSHOT
Visit Information Date & Time Provider Department Dept. Phone Encounter #  
 4/4/2017  2:15 PM [phone] Your Appointments 7/27/2017  3:00 PM  
ROUTINE CARE with Larene Opitz, MD [address] [facility] [address] [facility]) Appt Note: 4m fu for F/U fibromyalgia  
 [address], [address] [place]  
[address], [place] [address] [phone] Upcoming Health Maintenance Date Due DTaP/Tdap/Td series (1 - Tdap) 6/30/2017* *Topic was postponed. The date shown is not the original due date. Allergies as of 4/4/2017  Review Complete On: 4/4/2017 By: Franc Howard NP Severity Noted Reaction Type Reactions Augmentin [Amoxicillin-pot Clavulanate]  03/24/2010    Nausea and Vomiting Avelox [Moxifloxacin]  03/24/2010    Other (comments) \"Gave her really bad thrush\" Percocet [Oxycodone-acetaminophen]  03/24/2010    Other (comments) \"Severe Migraines\" Current Immunizations  Never Reviewed Name Date Influenza Vaccine 9/2/2015, 10/29/2014 TB Skin Test (PPD) Intradermal 8/8/2016 Not reviewed this visit You Were Diagnosed With   
  
 Codes Comments Urinary frequency    -  Primary ICD-10-CM: R35.0 ICD-9-CM: 788.41 Dysuria     ICD-10-CM: R30.0 ICD-9-CM: 076. 1 Acute cystitis without hematuria     ICD-10-CM: N30.00 ICD-9-CM: 595.0 Vitals BP Pulse Temp Resp Height(growth percentile) Weight(growth percentile) 120/84 (BP 1 Location: Left arm, BP Patient Position: Sitting) 74 98.4 °F (36.9 °C) (Oral) 18 5' 8\" (1.727 m) 292 lb (132.5 kg) SpO2 BMI OB Status Smoking Status 97% 44.4 kg/m2 Hysterectomy Former Smoker Vitals History BMI and BSA Data Body Mass Index Body Surface Area 44.4 kg/m 2 2.52 m 2 Preferred Pharmacy Pharmacy Name Phone [facility] 52 [phone] [facility], [address] [place] [address] [phone] Your Updated Medication List  
  
   
This list is accurate as of: 4/4/17  2:19 PM.  Always use your most recent med list.  
  
  
  
  
 acyclovir 400 mg tablet Commonly known as:  ZOVIRAX Take 1 Tab by mouth five (5) times daily. Indications: as needed  
  
 diazePAM 2 mg tablet Commonly known as:  VALIUM Take 2.5 Tabs by mouth every twelve (12) hours as needed for Anxiety. Max Daily Amount: 10 mg.  
  
 * DULoxetine 30 mg capsule Commonly known as:  CYMBALTA Take 1 Cap by mouth daily. * DULoxetine 60 mg capsule Commonly known as:  CYMBALTA Take 1 Cap by mouth daily. ergocalciferol 50,000 unit capsule Commonly known as:  ERGOCALCIFEROL Take 1 Cap by mouth every seven (7) days. esomeprazole 20 mg capsule Commonly known as:  NexIUM Take 1 Cap by mouth daily. fluticasone 50 mcg/actuation nasal spray Commonly known as:  Dylan Settles 2 Sprays by Both Nostrils route daily. furosemide 20 mg tablet Commonly known as:  LASIX Take 1 Tab by mouth daily as needed. Fluid overload/edema  
  
 levothyroxine 88 mcg tablet Commonly known as:  SYNTHROID  
TAKE 1 TABLET BY MOUTH ONCE DAILY BEFORE BREAKFAST  
  
 rizatriptan 10 mg tablet Commonly known as:  [place] Take 1 Tab by mouth once as needed. May repeat in 2 hours if needed  
  
 traMADol 50 mg tablet Commonly known as:  ULTRAM  
Take 1 Tab by mouth every eight (8) hours as needed for Pain. Max Daily Amount: 150 mg.  
  
 trimethoprim-sulfamethoxazole 160-800 mg per tablet Commonly known as:  BACTRIM DS, SEPTRA DS Take 1 Tab by mouth two (2) times a day for 3 days. zolpidem 10 mg tablet Commonly known as:  AMBIEN  
TAKE 1 TABLET BY MOUTH AT BEDTIME AS NEEDED FOR SLEEP * Notice:   This list has 2 medication(s) that are the same as other medications prescribed for you. Read the directions carefully, and ask your doctor or other care provider to review them with you. Prescriptions Sent to Pharmacy Refills  
 trimethoprim-sulfamethoxazole (BACTRIM DS, SEPTRA DS) 160-800 mg per tablet 0 Sig: Take 1 Tab by mouth two (2) times a day for 3 days. Class: Normal  
 Pharmacy: [facility]  [address] #: [phone] Route: Oral  
  
We Performed the Following AMB POC URINALYSIS DIP STICK AUTO W/O MICRO [74010 CPT(R)] Patient Instructions Urinary Tract Infection in Women: Care Instructions Your Care Instructions A urinary tract infection, or UTI, is a general term for an infection anywhere between the kidneys and the urethra (where urine comes out). Most UTIs are bladder infections. They often cause pain or burning when you urinate. UTIs are caused by bacteria and can be cured with antibiotics. Be sure to complete your treatment so that the infection goes away. Follow-up care is a key part of your treatment and safety. Be sure to make and go to all appointments, and call your doctor if you are having problems. It's also a good idea to know your test results and keep a list of the medicines you take. How can you care for yourself at home? · Take your antibiotics as directed. Do not stop taking them just because you feel better. You need to take the full course of antibiotics. · Drink extra water and other fluids for the next day or two. This may help wash out the bacteria that are causing the infection. (If you have kidney, heart, or liver disease and have to limit fluids, talk with your doctor before you increase your fluid intake.) · Avoid drinks that are carbonated or have caffeine. They can irritate the bladder. · Urinate often. Try to empty your bladder each time.  
· To relieve pain, take a hot bath or lay a heating pad set on low over your lower belly or genital area. Never go to sleep with a heating pad in place. To prevent UTIs · Drink plenty of water each day. This helps you urinate often, which clears bacteria from your system. (If you have kidney, heart, or liver disease and have to limit fluids, talk with your doctor before you increase your fluid intake.) · Urinate when you need to. · Urinate right after you have sex. · Change sanitary pads often. · Avoid douches, bubble baths, feminine hygiene sprays, and other feminine hygiene products that have deodorants. · After going to the bathroom, wipe from front to back. When should you call for help? Call your doctor now or seek immediate medical care if: · Symptoms such as fever, chills, nausea, or vomiting get worse or appear for the first time. · You have new pain in your back just below your rib cage. This is called flank pain. · There is new blood or pus in your urine. · You have any problems with your antibiotic medicine. Watch closely for changes in your health, and be sure to contact your doctor if: 
· You are not getting better after taking an antibiotic for 2 days. · Your symptoms go away but then come back. Where can you learn more? Go to http://erick-manjula.info/. Enter F034 in the search box to learn more about \"Urinary Tract Infection in Women: Care Instructions. \" Current as of: November 28, 2016 Content Version: 11.2 © 8716-4199 [employer]. Care instructions adapted under license by [employer] (which disclaims liability or warranty for this information). If you have questions about a medical condition or this instruction, always ask your healthcare professional. [place] [address] any warranty or liability for your use of this information. Introducing [facility]! Dear Pato Villafuerte: Thank you for requesting a [employer] account.   Our records indicate that you have previously registered for a [email or web address] account but its currently inactive. Please call our [email or web address] support line at [phone]. Additional Information If you have questions, please visit the Frequently Asked Questions section of the [email or web address] website at [email or web address] Remember, [email or web address] is NOT to be used for urgent needs. For medical emergencies, dial 911. Now available from your iPhone and Android! Please provide this summary of care documentation to your next provider. Your primary care clinician is listed as Apollo Ramirez. If you have any questions after today's visit, please call [phone].

## 2017-04-06 LAB — BACTERIA UR CULT: NORMAL

## 2017-04-11 ENCOUNTER — TELEPHONE (OUTPATIENT)
Dept: INTERNAL MEDICINE CLINIC | Age: 47
End: 2017-04-11

## 2017-04-23 DIAGNOSIS — G47.00 PERSISTENT DISORDER OF INITIATING OR MAINTAINING SLEEP: ICD-10-CM

## 2017-04-23 DIAGNOSIS — M54.50 CHRONIC LOW BACK PAIN WITHOUT SCIATICA, UNSPECIFIED BACK PAIN LATERALITY: ICD-10-CM

## 2017-04-23 DIAGNOSIS — G89.29 CHRONIC LOW BACK PAIN WITHOUT SCIATICA, UNSPECIFIED BACK PAIN LATERALITY: ICD-10-CM

## 2017-04-23 DIAGNOSIS — M79.7 FIBROMYALGIA: ICD-10-CM

## 2017-04-24 RX ORDER — DIAZEPAM 2 MG/1
TABLET ORAL
Qty: 60 TAB | Refills: 0 | Status: SHIPPED | OUTPATIENT
Start: 2017-04-24 | End: 2017-06-05 | Stop reason: SDUPTHER

## 2017-04-24 RX ORDER — TRAMADOL HYDROCHLORIDE 50 MG/1
TABLET ORAL
Qty: 90 TAB | Refills: 0 | Status: SHIPPED | OUTPATIENT
Start: 2017-04-24 | End: 2017-06-10 | Stop reason: SDUPTHER

## 2017-04-24 RX ORDER — ZOLPIDEM TARTRATE 10 MG/1
TABLET ORAL
Qty: 30 TAB | Refills: 0 | Status: SHIPPED | OUTPATIENT
Start: 2017-04-24 | End: 2017-06-12 | Stop reason: SDUPTHER

## 2017-04-28 DIAGNOSIS — G47.00 PERSISTENT DISORDER OF INITIATING OR MAINTAINING SLEEP: ICD-10-CM

## 2017-04-28 DIAGNOSIS — M79.7 FIBROMYALGIA: ICD-10-CM

## 2017-04-28 DIAGNOSIS — G89.29 CHRONIC LOW BACK PAIN WITHOUT SCIATICA, UNSPECIFIED BACK PAIN LATERALITY: ICD-10-CM

## 2017-04-28 DIAGNOSIS — M54.50 CHRONIC LOW BACK PAIN WITHOUT SCIATICA, UNSPECIFIED BACK PAIN LATERALITY: ICD-10-CM

## 2017-04-28 RX ORDER — DULOXETIN HYDROCHLORIDE 60 MG/1
CAPSULE, DELAYED RELEASE ORAL
Qty: 30 CAP | Refills: 0 | Status: SHIPPED | OUTPATIENT
Start: 2017-04-28 | End: 2020-02-03 | Stop reason: SDUPTHER

## 2017-04-28 RX ORDER — LEVOTHYROXINE SODIUM 88 UG/1
TABLET ORAL
Qty: 30 TAB | Refills: 0 | Status: SHIPPED | OUTPATIENT
Start: 2017-04-28 | End: 2017-06-14 | Stop reason: SDUPTHER

## 2017-04-28 NOTE — TELEPHONE ENCOUNTER
----- Message from Denny Murcia sent at 4/28/2017  9:33 AM EDT -----  Regarding: Dr. Sammy Sylvester Refill  Pt is requesting Rx for Tramadol 50 mg and Zolpidem 10 mg to be sent to her [facility] on file. Best (C) [phone].

## 2017-05-01 NOTE — TELEPHONE ENCOUNTER
Last office visit - 03.24.`7  Next office visit - 07.27.17    Requested Prescriptions     Pending Prescriptions Disp Refills    traMADol (ULTRAM) 50 mg tablet 90 Tab 0    zolpidem (AMBIEN) 10 mg tablet 30 Tab 0

## 2017-05-02 RX ORDER — ZOLPIDEM TARTRATE 10 MG/1
TABLET ORAL
Qty: 30 TAB | Refills: 0 | OUTPATIENT
Start: 2017-05-02

## 2017-05-02 RX ORDER — TRAMADOL HYDROCHLORIDE 50 MG/1
TABLET ORAL
Qty: 90 TAB | Refills: 0 | OUTPATIENT
Start: 2017-05-02

## 2017-06-10 DIAGNOSIS — G89.29 CHRONIC LOW BACK PAIN WITHOUT SCIATICA, UNSPECIFIED BACK PAIN LATERALITY: ICD-10-CM

## 2017-06-10 DIAGNOSIS — M54.50 CHRONIC LOW BACK PAIN WITHOUT SCIATICA, UNSPECIFIED BACK PAIN LATERALITY: ICD-10-CM

## 2017-06-10 DIAGNOSIS — M79.7 FIBROMYALGIA: ICD-10-CM

## 2017-06-12 DIAGNOSIS — G47.00 PERSISTENT DISORDER OF INITIATING OR MAINTAINING SLEEP: ICD-10-CM

## 2017-06-12 RX ORDER — TRAMADOL HYDROCHLORIDE 50 MG/1
TABLET ORAL
Qty: 90 TAB | Refills: 0 | Status: SHIPPED | OUTPATIENT
Start: 2017-06-12 | End: 2017-07-11 | Stop reason: SDUPTHER

## 2017-06-13 RX ORDER — ZOLPIDEM TARTRATE 10 MG/1
TABLET ORAL
Qty: 30 TAB | Refills: 0 | Status: SHIPPED | OUTPATIENT
Start: 2017-06-13 | End: 2017-07-12 | Stop reason: SDUPTHER

## 2017-06-15 RX ORDER — LEVOTHYROXINE SODIUM 88 UG/1
TABLET ORAL
Qty: 30 TAB | Refills: 0 | Status: SHIPPED | OUTPATIENT
Start: 2017-06-15 | End: 2017-07-26 | Stop reason: SDUPTHER

## 2017-06-26 RX ORDER — DULOXETIN HYDROCHLORIDE 60 MG/1
CAPSULE, DELAYED RELEASE ORAL
Qty: 30 CAP | Refills: 0 | Status: SHIPPED | OUTPATIENT
Start: 2017-06-26 | End: 2020-02-03 | Stop reason: SDUPTHER

## 2017-07-11 DIAGNOSIS — M54.50 CHRONIC LOW BACK PAIN WITHOUT SCIATICA, UNSPECIFIED BACK PAIN LATERALITY: ICD-10-CM

## 2017-07-11 DIAGNOSIS — M79.7 FIBROMYALGIA: ICD-10-CM

## 2017-07-11 DIAGNOSIS — G89.29 CHRONIC LOW BACK PAIN WITHOUT SCIATICA, UNSPECIFIED BACK PAIN LATERALITY: ICD-10-CM

## 2017-07-11 NOTE — TELEPHONE ENCOUNTER
Last office visit- 3/24/17  Next office visit- 7/28/17  Last Refill- 6/12/17    Requested Prescriptions     Pending Prescriptions Disp Refills    traMADol (ULTRAM) 50 mg tablet 90 Tab 0

## 2017-07-12 ENCOUNTER — DOCUMENTATION ONLY (OUTPATIENT)
Dept: INTERNAL MEDICINE CLINIC | Age: 47
End: 2017-07-12

## 2017-07-12 DIAGNOSIS — G47.00 PERSISTENT DISORDER OF INITIATING OR MAINTAINING SLEEP: ICD-10-CM

## 2017-07-12 RX ORDER — ZOLPIDEM TARTRATE 10 MG/1
TABLET ORAL
Qty: 30 TAB | Refills: 0 | OUTPATIENT
Start: 2017-07-12 | End: 2017-08-15 | Stop reason: SDUPTHER

## 2017-07-12 RX ORDER — TRAMADOL HYDROCHLORIDE 50 MG/1
TABLET ORAL
Qty: 90 TAB | Refills: 0 | OUTPATIENT
Start: 2017-07-12 | End: 2017-08-29 | Stop reason: SDUPTHER

## 2017-07-12 NOTE — TELEPHONE ENCOUNTER
Last office visit- 4/4/17  Next office visit- 7/28/17  Last Refill- 6/13/17    Requested Prescriptions     Pending Prescriptions Disp Refills    zolpidem (AMBIEN) 10 mg tablet 30 Tab 0

## 2017-07-17 ENCOUNTER — TELEPHONE (OUTPATIENT)
Dept: INTERNAL MEDICINE CLINIC | Age: 47
End: 2017-07-17

## 2017-07-17 NOTE — TELEPHONE ENCOUNTER
Called in prescription for patient to [facility] pharmacy [address]. Pharmacy will notify patient when prescription is ready for .

## 2017-07-25 RX ORDER — RIZATRIPTAN BENZOATE 10 MG/1
TABLET ORAL
Qty: 12 TAB | Refills: 32 | Status: SHIPPED | OUTPATIENT
Start: 2017-07-25

## 2017-07-26 DIAGNOSIS — E55.9 VITAMIN D DEFICIENCY: Primary | ICD-10-CM

## 2017-07-26 DIAGNOSIS — E55.9 VITAMIN D DEFICIENCY: ICD-10-CM

## 2017-07-26 RX ORDER — ACETAMINOPHEN 500 MG
TABLET ORAL
Qty: 30 CAP | Refills: 5 | Status: SHIPPED | OUTPATIENT
Start: 2017-07-26

## 2017-07-26 RX ORDER — ERGOCALCIFEROL 1.25 MG/1
50000 CAPSULE ORAL
Qty: 4 CAP | Refills: 11 | OUTPATIENT
Start: 2017-07-26

## 2017-07-26 NOTE — TELEPHONE ENCOUNTER
Last office visit- 4/4/17   Next office visit- No Upcoming   Last Refill- 2/29/16    Requested Prescriptions     Pending Prescriptions Disp Refills    ergocalciferol (ERGOCALCIFEROL) 50,000 unit capsule 4 Cap 11     Sig: Take 1 Cap by mouth every seven (7) days.

## 2017-07-27 RX ORDER — LEVOTHYROXINE SODIUM 88 UG/1
TABLET ORAL
Qty: 30 TAB | Refills: 0 | Status: SHIPPED | OUTPATIENT
Start: 2017-07-27 | End: 2017-09-11 | Stop reason: SDUPTHER

## 2017-07-27 RX ORDER — DULOXETIN HYDROCHLORIDE 60 MG/1
CAPSULE, DELAYED RELEASE ORAL
Qty: 15 CAP | Refills: 0 | Status: SHIPPED | OUTPATIENT
Start: 2017-07-27 | End: 2020-02-03 | Stop reason: SDUPTHER

## 2017-08-03 DIAGNOSIS — M79.7 FIBROMYALGIA: ICD-10-CM

## 2017-08-03 DIAGNOSIS — G47.00 PERSISTENT DISORDER OF INITIATING OR MAINTAINING SLEEP: ICD-10-CM

## 2017-08-03 RX ORDER — DIAZEPAM 2 MG/1
TABLET ORAL
Qty: 60 TAB | Refills: 0 | OUTPATIENT
Start: 2017-08-03 | End: 2017-09-20 | Stop reason: SDUPTHER

## 2017-08-03 NOTE — TELEPHONE ENCOUNTER
Last office visit- 4/4/17  Next office visit- No upcoming   Last Refill- 6/6/17    Requested Prescriptions     Pending Prescriptions Disp Refills    diazePAM (VALIUM) 2 mg tablet 60 Tab 0     Sig: TAKE 2 AND 1/2 TABLETS BY MOUTH EVERY 12 HOURS AS NEEDED FOR ANXIETY

## 2017-08-15 DIAGNOSIS — G47.00 PERSISTENT DISORDER OF INITIATING OR MAINTAINING SLEEP: ICD-10-CM

## 2017-08-15 RX ORDER — DULOXETIN HYDROCHLORIDE 60 MG/1
CAPSULE, DELAYED RELEASE ORAL
Qty: 15 CAP | Refills: 0 | Status: SHIPPED | OUTPATIENT
Start: 2017-08-15 | End: 2020-02-03 | Stop reason: SDUPTHER

## 2017-08-15 NOTE — TELEPHONE ENCOUNTER
Last office visit- 4/4/17  Next office visit- No Upcoming   Last Refill- 7/12/17    Requested Prescriptions     Pending Prescriptions Disp Refills    zolpidem (AMBIEN) 10 mg tablet 30 Tab 0     Sig: TAKE ONE TABLET BY MOUTH EVERY NIGHT AT BEDTIME AS NEEDED

## 2017-08-16 RX ORDER — ZOLPIDEM TARTRATE 10 MG/1
TABLET ORAL
Qty: 30 TAB | Refills: 0 | OUTPATIENT
Start: 2017-08-16 | End: 2022-04-18 | Stop reason: ALTCHOICE

## 2017-08-29 DIAGNOSIS — G89.29 CHRONIC LOW BACK PAIN WITHOUT SCIATICA, UNSPECIFIED BACK PAIN LATERALITY: ICD-10-CM

## 2017-08-29 DIAGNOSIS — M54.50 CHRONIC LOW BACK PAIN WITHOUT SCIATICA, UNSPECIFIED BACK PAIN LATERALITY: ICD-10-CM

## 2017-08-29 DIAGNOSIS — M79.7 FIBROMYALGIA: ICD-10-CM

## 2017-08-29 NOTE — TELEPHONE ENCOUNTER
Last office visit- 4/4/17  Next office visit- No Upcoming  Last Refill- 7/12/17    Requested Prescriptions     Pending Prescriptions Disp Refills    traMADol (ULTRAM) 50 mg tablet 90 Tab 0     Sig: TAKE ONE TABLET BY MOUTH EVERY 8 HOURS AS NEEDED FOR PAIN

## 2017-08-30 RX ORDER — TRAMADOL HYDROCHLORIDE 50 MG/1
TABLET ORAL
Qty: 90 TAB | Refills: 0 | Status: SHIPPED | OUTPATIENT
Start: 2017-08-30 | End: 2022-04-18 | Stop reason: ALTCHOICE

## 2017-09-11 RX ORDER — DULOXETIN HYDROCHLORIDE 60 MG/1
CAPSULE, DELAYED RELEASE ORAL
Qty: 15 CAP | Refills: 0 | Status: SHIPPED | OUTPATIENT
Start: 2017-09-11 | End: 2020-02-03

## 2017-09-11 RX ORDER — LEVOTHYROXINE SODIUM 88 UG/1
TABLET ORAL
Qty: 30 TAB | Refills: 0 | Status: SHIPPED | OUTPATIENT
Start: 2017-09-11 | End: 2017-10-21 | Stop reason: SDUPTHER

## 2017-09-12 DIAGNOSIS — M79.7 FIBROMYALGIA: ICD-10-CM

## 2017-09-12 DIAGNOSIS — G47.00 PERSISTENT DISORDER OF INITIATING OR MAINTAINING SLEEP: ICD-10-CM

## 2017-09-12 NOTE — TELEPHONE ENCOUNTER
Patient is overdue for visit, but numbers listed in chart are \"not in service\". Unable to schedule patient.   Requested prescriptions from [facility] have been pended to Dr. Aline Peraza for approval.

## 2017-09-14 RX ORDER — DIAZEPAM 2 MG/1
TABLET ORAL
Qty: 60 TAB | Refills: 0 | OUTPATIENT
Start: 2017-09-14

## 2017-09-14 RX ORDER — ZOLPIDEM TARTRATE 10 MG/1
TABLET ORAL
Qty: 30 TAB | Refills: 0 | OUTPATIENT
Start: 2017-09-14

## 2017-09-15 NOTE — TELEPHONE ENCOUNTER
Pt call today for a refill on zolpidem.   Notice this is a second request And it was denied  Pt sched an appt for 10/04/17 any question call pt at [phone]

## 2017-09-20 RX ORDER — DIAZEPAM 2 MG/1
TABLET ORAL
Qty: 20 TAB | Refills: 0 | Status: SHIPPED | OUTPATIENT
Start: 2017-09-20 | End: 2022-04-18 | Stop reason: ALTCHOICE

## 2017-09-20 RX ORDER — CYCLOBENZAPRINE HCL 10 MG
10 TABLET ORAL
Qty: 30 TAB | Refills: 5 | Status: SHIPPED | OUTPATIENT
Start: 2017-09-20

## 2017-09-20 NOTE — TELEPHONE ENCOUNTER
Valium called into pharmacy. Contacted patient to move appointment to sooner date.  Scheduled for Thursday, September 21, 2017 01:45 PM

## 2017-09-20 NOTE — TELEPHONE ENCOUNTER
Requested Prescriptions     Refused Prescriptions Disp Refills    diazePAM (VALIUM) 2 mg tablet 60 Tab 0     Sig: TAKE 2 AND 1/2 TABLETS BY MOUTH EVERY 12 HOURS AS NEEDED FOR ANXIETY     Refused By: Derek Gonzalez     Reason for Refusal: Appt required, please call patient    zolpidem (AMBIEN) 10 mg tablet 30 Tab 0     Sig: TAKE ONE TABLET BY MOUTH EVERY NIGHT AT BEDTIME AS NEEDED     Refused By: Derek Gonzalez     Reason for Refusal: Appt required, please call patient     Patient has scheduled an appointment for 10/04/2017. She is also asking for a refill of cyclobenzaprine (FLEXERIL) 10 mg tablet. This was last prescribed by Amy canales on 06/29/2016

## 2017-09-22 DIAGNOSIS — G47.00 PERSISTENT DISORDER OF INITIATING OR MAINTAINING SLEEP: ICD-10-CM

## 2017-09-22 RX ORDER — ZOLPIDEM TARTRATE 10 MG/1
TABLET ORAL
Qty: 30 TAB | Refills: 0 | OUTPATIENT
Start: 2017-09-22

## 2017-09-22 NOTE — TELEPHONE ENCOUNTER
Last office visit- 8/29/17  Next office visit- 10/4/17  Last refill-8/16/17    Requested Prescriptions     Pending Prescriptions Disp Refills    zolpidem (AMBIEN) 10 mg tablet 30 Tab 0     Sig: TAKE ONE TABLET BY MOUTH EVERY NIGHT AT BEDTIME AS NEEDED

## 2017-09-25 RX ORDER — BUTALBITAL, ACETAMINOPHEN AND CAFFEINE 50; 325; 40 MG/1; MG/1; MG/1
TABLET ORAL
Qty: 30 TAB | Refills: 1 | Status: SHIPPED | OUTPATIENT
Start: 2017-09-25 | End: 2022-04-18 | Stop reason: ALTCHOICE

## 2017-09-27 ENCOUNTER — APPOINTMENT (OUTPATIENT)
Dept: GENERAL RADIOLOGY | Age: 47
End: 2017-09-27
Attending: PHYSICIAN ASSISTANT
Payer: COMMERCIAL

## 2017-09-27 ENCOUNTER — HOSPITAL ENCOUNTER (EMERGENCY)
Age: 47
Discharge: HOME | End: 2017-09-27
Attending: EMERGENCY MEDICINE
Payer: COMMERCIAL

## 2017-09-27 VITALS
HEIGHT: 68 IN | BODY MASS INDEX: 44.1 KG/M2 | HEART RATE: 68 BPM | OXYGEN SATURATION: 100 % | DIASTOLIC BLOOD PRESSURE: 85 MMHG | WEIGHT: 291 LBS | SYSTOLIC BLOOD PRESSURE: 132 MMHG | TEMPERATURE: 97.7 F | RESPIRATION RATE: 18 BRPM

## 2017-09-27 DIAGNOSIS — M25.562 ACUTE PAIN OF LEFT KNEE: Primary | ICD-10-CM

## 2017-09-27 PROCEDURE — 73562 X-RAY EXAM OF KNEE 3: CPT

## 2017-09-27 PROCEDURE — 99283 EMERGENCY DEPT VISIT LOW MDM: CPT

## 2017-09-27 PROCEDURE — 93971 EXTREMITY STUDY: CPT

## 2017-09-27 RX ORDER — DICLOFENAC POTASSIUM 50 MG/1
50 TABLET, FILM COATED ORAL 3 TIMES DAILY
Qty: 15 TAB | Refills: 0 | Status: SHIPPED | OUTPATIENT
Start: 2017-09-27 | End: 2020-02-03

## 2017-09-27 NOTE — ED PROVIDER NOTES
HPI Comments: 43 yo  female with medical hx remarkable for thoracic aortic aneurysm, chronic knee pain, fibromyalgia, genital herpes, hypothyroidism, migraines, hyperlipidemia, and obesity presenting ambulatory to the ED with complaint of a two week hx of left knee pain, throbbing in nature, worse with ambulating without any improving factors. No trauma prior . Seen at [facility] last week and diagnosed with \"arthritis and joint effusion\". Reports worsening swelling. Pain is generalized at the knee and radiates into the back of the knee. Applied compression, took advil and tramadol with minimal resolve. Works at [employer] and stands for extended periods of time. No fever, headache, sore throat, cough, rhinorrhea, sneezing, SOB, abdominal pain, nausea, vomiting, or urinary complaints. Patient is a 42 y.o. female presenting with knee pain. The history is provided by the patient. Knee Pain    Pertinent negatives include no numbness, no back pain and no neck pain. Past Medical History:   Diagnosis Date    Aortic aneurysm (thoracic)     Arthritis     Chronic knee pain 3/24/2010    Chronic low back pain 3/24/2010    Chronic pain     Depression 3/24/2010    Fibromyalgia 2014    Genital herpes 3/24/2010    GERD (gastroesophageal reflux disease) 3/24/2010    H/O endoscopic retrograde cholangiopancreatography [id number]    Hyperlipidemia 3/24/2010    Hypothyroidism 3/24/2010    Migraines 3/24/2010    Obesity 3/24/2010    Persistent disorder of initiating or maintaining sleep 3/24/2010    Plantar fasciitis 3/24/2010       Past Surgical History:   Procedure Laterality Date    HX  SECTION     [facility] CHOLECYSTECTOMY  [id number]    HX CHOLECYSTECTOMY  2013    emergency cholectomy    HX GYN  [id number]    hysterectomy,endometrial ablation    HX HYSTERECTOMY           History reviewed. No pertinent family history.     Social History     Social History    Marital status:  Spouse name: N/A    Number of children: N/A    Years of education: N/A     Occupational History    Not on file. Social History Main Topics    Smoking status: Former Smoker    Smokeless tobacco: Never Used      Comment: quit 9 yrs ago    Alcohol use Yes      Comment: Socially    Drug use: No    Sexual activity: Yes     Partners: Male     Birth control/ protection: None     Other Topics Concern    Not on file     Social History Narrative         ALLERGIES: Augmentin [amoxicillin-pot clavulanate]; Avelox [moxifloxacin]; and Percocet [oxycodone-acetaminophen]    Review of Systems   Constitutional: Negative. Negative for chills, fatigue and fever. HENT: Negative. Negative for congestion, ear pain, facial swelling, rhinorrhea, sneezing and sore throat. Eyes: Negative for pain, discharge and itching. Respiratory: Negative for cough, chest tightness and shortness of breath. Cardiovascular: Negative. Negative for chest pain and leg swelling. Gastrointestinal: Negative. Negative for abdominal distention, abdominal pain, constipation, diarrhea, nausea and vomiting. Genitourinary: Negative for difficulty urinating, frequency and urgency. Musculoskeletal: Positive for arthralgias (left knee) and joint swelling (left knee). Negative for back pain, neck pain and neck stiffness. Skin: Negative for color change and rash. Neurological: Negative for dizziness, numbness and headaches. Psychiatric/Behavioral: Negative for confusion and decreased concentration. All other systems reviewed and are negative. Vitals:    09/27/17 0026   BP: 119/79   Pulse: 83   Resp: 18   Temp: 98.1 °F (36.7 °C)   SpO2: 100%   Weight: 132 kg (291 lb)   Height: 5' 8\" (1.727 m)            Physical Exam   Constitutional: She is oriented to person, place, and time. She appears well-developed and well-nourished. No distress. Obese  female in NAD   HENT:   Head: Normocephalic and atraumatic.    Right Ear: External ear normal.   Left Ear: External ear normal.   Nose: Nose normal.   Mouth/Throat: Oropharynx is clear and moist. No oropharyngeal exudate. Eyes: Conjunctivae and EOM are normal. Pupils are equal, round, and reactive to light. Right eye exhibits no discharge. Left eye exhibits no discharge. No scleral icterus. Neck: Normal range of motion. Neck supple. Cardiovascular: Normal rate and regular rhythm. Exam reveals no gallop and no friction rub. No murmur heard. Pulmonary/Chest: Effort normal and breath sounds normal. She has no wheezes. She has no rales. Abdominal: Soft. Bowel sounds are normal. She exhibits no distension. There is no tenderness. There is no rebound and no guarding. Musculoskeletal:        Left knee: She exhibits swelling. She exhibits normal range of motion, no effusion (difficult to assess secondary to body habitus), no deformity, no laceration and no erythema. Tenderness (generalized) found. Neurological: She is alert and oriented to person, place, and time. No cranial nerve deficit. Coordination normal.   Skin: Skin is warm and dry. She is not diaphoretic. Psychiatric: She has a normal mood and affect. Her behavior is normal.   Nursing note and vitals reviewed. MDM  Number of Diagnoses or Management Options  Diagnosis management comments: 41 yo  female with complaint of a two week hx of left knee pain and swelling. Reported hx of OA. Afebrile and able to passively range joint. Doubt septic arthropathy. ? DVT vs OA vs crystal arthropathy vs overuse syndrome  Plan  Xray left knee       Amount and/or Complexity of Data Reviewed  Tests in the radiology section of CPT®: ordered and reviewed  Independent visualization of images, tracings, or specimens: yes      ED Course       Procedures         Progress note    Xray reviewed. Osteopenia with minimal degeneration noted. Halima SHEARER [place], [place]    Patient's results have been reviewed with them.   Patient and/or family have verbally conveyed their understanding and agreement of the patient's signs, symptoms, diagnosis, treatment and prognosis and additionally agree to follow up as recommended or return to the Emergency Room should their condition change prior to follow-up. Discharge instructions have also been provided to the patient with some educational information regarding their diagnosis as well a list of reasons why they would want to return to the ER prior to their follow-up appointment should their condition change. April MANFRED [facility], [address]       A/P  Left knee pain: Follow-up with orthopedist. Elevate the leg. Naprosyn twice daily for pain. Return for any new or worsening.  April MANFRED [facility], [address]

## 2017-09-27 NOTE — LETTER
[address] 
[address] [facility] [id number] 
[phone] Work/School Note Date: 9/27/2017 To Whom It May concern: 
 
Sigifredo Bach was seen and treated today in the emergency room by the following provider(s): 
Attending Provider: Moira Cockayne, MD 
Physician Assistant: Heather Bernstein. Sigifredo Bach may return to work on Monday October 2, 2017. Sincerely, Halima Falcon, [address]

## 2017-09-27 NOTE — DISCHARGE INSTRUCTIONS
We hope that we have addressed all of your medical concerns. The examination and treatment you received in the Emergency Department were for an emergent problem and were not intended as complete care. It is important that you follow up with your healthcare provider(s) for ongoing care. If your symptoms worsen or do not improve as expected, and you are unable to reach your usual health care provider(s), you should return to the Emergency Department. Today's healthcare is undergoing tremendous change, and patient satisfaction surveys are one of the many tools to assess the quality of medical care. You may receive a survey from the [facility] regarding your experience in the Emergency Department. I hope that your experience has been completely positive, particularly the medical care that I provided. As such, please participate in the survey; anything less than excellent does not meet my expectations or intentions. [facility] and [address] [place] [address] participate in nationally recognized quality of care measures. If your blood pressure is greater than 120/80, as reported below, we urge that you seek medical care to address the potential of high blood pressure, commonly known as hypertension. Hypertension can be hereditary or can be caused by certain medical conditions, pain, stress, or \"white coat syndrome. \"       Please make an appointment with your health care provider(s) for follow up of your Emergency Department visit. VITALS:   Patient Vitals for the past 8 hrs:   Temp Pulse Resp BP SpO2   09/27/17 0026 98.1 °F (36.7 °C) 83 18 119/79 100 %          Thank you for allowing us to provide you with medical care today. We realize that you have many choices for your emergency care needs. Please choose us in the future for any continued health care needs. Regards,           April MANFRED.  Chandra, [facility]. Office: [phone]            No results found for this or any previous visit (from the past 24 hour(s)). Xr Knee Lt 3 V    Result Date: 9/27/2017  EXAM:  XR KNEE LT 3 V INDICATION:   pain. Left knee pain and swelling for 2 weeks. No recent trauma COMPARISON: None. FINDINGS: Three views of the left knee demonstrate no fracture or other acute osseous or articular abnormality. There is no effusion. Bones are osteopenic with slight spurring in the medial joint compartment     IMPRESSION:  1. Osteopenia and minimal degeneration of the medial joint compartment. Knee Pain or Injury: Care Instructions  Your Care Instructions    Injuries are a common cause of knee problems. Sudden (acute) injuries may be caused by a direct blow to the knee. They can also be caused by abnormal twisting, bending, or falling on the knee. Pain, bruising, or swelling may be severe, and may start within minutes of the injury. Overuse is another cause of knee pain. Other causes are climbing stairs, kneeling, and other activities that use the knee. Everyday wear and tear, especially as you get older, also can cause knee pain. Rest, along with home treatment, often relieves pain and allows your knee to heal. If you have a serious knee injury, you may need tests and treatment. Follow-up care is a key part of your treatment and safety. Be sure to make and go to all appointments, and call your doctor if you are having problems. It's also a good idea to know your test results and keep a list of the medicines you take. How can you care for yourself at home? · Be safe with medicines. Read and follow all instructions on the label. ¨ If the doctor gave you a prescription medicine for pain, take it as prescribed. ¨ If you are not taking a prescription pain medicine, ask your doctor if you can take an over-the-counter medicine. · Rest and protect your knee. Take a break from any activity that may cause pain.   · Put ice or a cold pack on your knee for 10 to 20 minutes at a time. Put a thin cloth between the ice and your skin. · Prop up a sore knee on a pillow when you ice it or anytime you sit or lie down for the next 3 days. Try to keep it above the level of your heart. This will help reduce swelling. · If your knee is not swollen, you can put moist heat, a heating pad, or a warm cloth on your knee. · If your doctor recommends an elastic bandage, sleeve, or other type of support for your knee, wear it as directed. · Follow your doctor's instructions about how much weight you can put on your leg. Use a cane, crutches, or a walker as instructed. · Follow your doctor's instructions about activity during your healing process. If you can do mild exercise, slowly increase your activity. · Reach and stay at a healthy weight. Extra weight can strain the joints, especially the knees and hips, and make the pain worse. Losing even a few pounds may help. When should you call for help? Call 911 anytime you think you may need emergency care. For example, call if:  · You have symptoms of a blood clot in your lung (called a pulmonary embolism). These may include:  ¨ Sudden chest pain. ¨ Trouble breathing. ¨ Coughing up blood. Call your doctor now or seek immediate medical care if:  · You have severe or increasing pain. · Your leg or foot turns cold or changes color. · You cannot stand or put weight on your knee. · Your knee looks twisted or bent out of shape. · You cannot move your knee. · You have signs of infection, such as:  ¨ Increased pain, swelling, warmth, or redness. ¨ Red streaks leading from the knee. ¨ Pus draining from a place on your knee. ¨ A fever. · You have signs of a blood clot in your leg (called a deep vein thrombosis), such as:  ¨ Pain in your calf, back of the knee, thigh, or groin. ¨ Redness and swelling in your leg or groin.   Watch closely for changes in your health, and be sure to contact your doctor if:  · You have tingling, weakness, or numbness in your knee. · You have any new symptoms, such as swelling. · You have bruises from a knee injury that last longer than 2 weeks. · You do not get better as expected. Where can you learn more? Go to http://erick-manjula.info/. Enter K195 in the search box to learn more about \"Knee Pain or Injury: Care Instructions. \"  Current as of: March 20, 2017  Content Version: 11.3  © 5215-6004 [employer]. Care instructions adapted under license by [employer] (which disclaims liability or warranty for this information). If you have questions about a medical condition or this instruction, always ask your healthcare professional. [address] any warranty or liability for your use of this information.

## 2017-09-27 NOTE — ED TRIAGE NOTES
Pt reports LEFT knee pain and swelling x 2 weeks. Seen at [facility] on 9/20 and diagnosed with Arthritis and Joint Effusion. Given Tramadol and Prednisone. Pt states the pain is worse. Unable to see Ortho without a referral and patient does not have a PCP. Steady gait noted.

## 2017-09-27 NOTE — PROCEDURES
[facility]  *** FINAL REPORT ***    Name: Tony Osorio  MRN: [id number]  : 08 Dec 1970  HIS Order #: [id number]  [address] [facility] Visit #: [id number]  Date: 27 Sep 2017    TYPE OF TEST: Peripheral Venous Testing    REASON FOR TEST  Pain in limb, Limb swelling    Left Leg:-  Deep venous thrombosis:           No  Superficial venous thrombosis:    No  Deep venous insufficiency:        Not examined  Superficial venous insufficiency: Not examined      INTERPRETATION/FINDINGS  PROCEDURE:  Color duplex ultrasound imaging of lower extremity veins. FINDINGS:       Right: The common femoral vein is patent and without evidence of  thrombus. Phasic flow is observed. This extremity was not otherwise  evaluated. Left:   The common femoral, deep femoral, femoral, popliteal,  posterior tibial, peroneal, and great saphenous are patent and without   evidence of thrombus;  each is fully compressible and there is no  narrowing of the flow channel on color Doppler imaging. Phasic flow  is observed in the common femoral vein. IMPRESSION:  No evidence of left lower extremity vein thrombosis. ADDITIONAL COMMENTS    I have personally reviewed the data relevant to the interpretation of  this  study.     TECHNOLOGIST: SYL Gill, RCS  Signed: 2017 02:01 AM    PHYSICIAN: Yessy Munoz MD  Signed: 2017 07:42 AM

## 2017-10-13 RX ORDER — FUROSEMIDE 20 MG/1
TABLET ORAL
Qty: 30 TAB | Refills: 0 | Status: SHIPPED | OUTPATIENT
Start: 2017-10-13 | End: 2022-04-18 | Stop reason: ALTCHOICE

## 2017-10-18 DIAGNOSIS — M79.7 FIBROMYALGIA: ICD-10-CM

## 2017-10-18 DIAGNOSIS — G89.29 CHRONIC LOW BACK PAIN WITHOUT SCIATICA, UNSPECIFIED BACK PAIN LATERALITY: ICD-10-CM

## 2017-10-18 DIAGNOSIS — M54.50 CHRONIC LOW BACK PAIN WITHOUT SCIATICA, UNSPECIFIED BACK PAIN LATERALITY: ICD-10-CM

## 2017-10-18 RX ORDER — TRAMADOL HYDROCHLORIDE 50 MG/1
TABLET ORAL
Qty: 90 TAB | Refills: 0 | OUTPATIENT
Start: 2017-10-18

## 2017-10-18 NOTE — TELEPHONE ENCOUNTER
Last office visit- 4/4/17  Next office visit- No upcoming   Last Refill- 8/30/17    Requested Prescriptions     Pending Prescriptions Disp Refills    traMADol (ULTRAM) 50 mg tablet 90 Tab 0     Sig: TAKE ONE TABLET BY MOUTH EVERY 8 HOURS AS NEEDED FOR PAIN

## 2017-10-21 RX ORDER — LEVOTHYROXINE SODIUM 88 UG/1
TABLET ORAL
Qty: 30 TAB | Refills: 0 | Status: SHIPPED | OUTPATIENT
Start: 2017-10-21

## 2017-10-24 ENCOUNTER — DOCUMENTATION ONLY (OUTPATIENT)
Dept: INTERNAL MEDICINE CLINIC | Age: 47
End: 2017-10-24

## 2017-10-24 DIAGNOSIS — G47.00 PERSISTENT DISORDER OF INITIATING OR MAINTAINING SLEEP: ICD-10-CM

## 2017-10-24 DIAGNOSIS — M79.7 FIBROMYALGIA: ICD-10-CM

## 2017-10-24 RX ORDER — ZOLPIDEM TARTRATE 10 MG/1
TABLET ORAL
Qty: 30 TAB | Refills: 0 | Status: CANCELLED | OUTPATIENT
Start: 2017-10-24

## 2017-10-24 RX ORDER — DIAZEPAM 2 MG/1
TABLET ORAL
Qty: 20 TAB | Refills: 0 | Status: CANCELLED | OUTPATIENT
Start: 2017-10-24

## 2017-10-24 NOTE — PROGRESS NOTES
Patient called requesting refill and also to make a routine appointment. I explained to the patient that she has had numerous cancellations and no shows. I also told her that Dr. Alek Walton had found that she had had other prescriptions filled by other providers. Patient stated that she hadn't and that her pharmacy must have made an error. ( shows that controlled substances were prescribed by someone other than Dr. Alek Walton) Explained that Dr. Alek Walton was dismissing her also for the continued cancelled and missed appointments and the breach of their professional.  Patient stated that she had been dismissed by her other provider because of the same reason and that she is challenged by automobile issues. I suggested that she go online to our website and find a provider that has walk in hours or may be closer to her home or work. Phone call ended in a positive manner.

## 2017-10-24 NOTE — TELEPHONE ENCOUNTER
Last office visit- 4/4/17  Next office visit- No Upcoming   Last Refill- 9/20/17,8/16/17    Requested Prescriptions     Pending Prescriptions Disp Refills    diazePAM (VALIUM) 2 mg tablet 20 Tab 0     Sig: TAKE 2 AND 1/2 TABLETS BY MOUTH EVERY 12 HOURS AS NEEDED FOR ANXIETY    zolpidem (AMBIEN) 10 mg tablet 30 Tab 0     Sig: TAKE ONE TABLET BY MOUTH EVERY NIGHT AT BEDTIME AS NEEDED

## 2017-10-27 DIAGNOSIS — M54.50 CHRONIC LOW BACK PAIN WITHOUT SCIATICA, UNSPECIFIED BACK PAIN LATERALITY: ICD-10-CM

## 2017-10-27 DIAGNOSIS — G89.29 CHRONIC LOW BACK PAIN WITHOUT SCIATICA, UNSPECIFIED BACK PAIN LATERALITY: ICD-10-CM

## 2017-10-27 DIAGNOSIS — M79.7 FIBROMYALGIA: ICD-10-CM

## 2017-10-27 RX ORDER — TRAMADOL HYDROCHLORIDE 50 MG/1
TABLET ORAL
Qty: 90 TAB | Refills: 0 | OUTPATIENT
Start: 2017-10-27

## 2017-10-27 NOTE — TELEPHONE ENCOUNTER
Last office visit- 4/4/17  Next office visit- no upcoming  Last refill- 8/31/17    Requested Prescriptions     Pending Prescriptions Disp Refills    traMADol (ULTRAM) 50 mg tablet 90 Tab 0     Sig: TAKE ONE TABLET BY MOUTH EVERY 8 HOURS AS NEEDED FOR PAIN

## 2020-01-17 ENCOUNTER — ED HISTORICAL/CONVERTED ENCOUNTER (OUTPATIENT)
Dept: OTHER | Age: 50
End: 2020-01-17

## 2020-02-03 ENCOUNTER — OFFICE VISIT (OUTPATIENT)
Dept: CARDIOLOGY CLINIC | Age: 50
End: 2020-02-03

## 2020-02-03 VITALS
RESPIRATION RATE: 16 BRPM | DIASTOLIC BLOOD PRESSURE: 70 MMHG | BODY MASS INDEX: 44.41 KG/M2 | SYSTOLIC BLOOD PRESSURE: 120 MMHG | HEART RATE: 84 BPM | HEIGHT: 68 IN | OXYGEN SATURATION: 97 % | WEIGHT: 293 LBS

## 2020-02-03 DIAGNOSIS — E66.01 OBESITY, MORBID (HCC): ICD-10-CM

## 2020-02-03 DIAGNOSIS — I71.20 THORACIC AORTIC ANEURYSM WITHOUT RUPTURE: ICD-10-CM

## 2020-02-03 DIAGNOSIS — R00.2 HEART PALPITATIONS: ICD-10-CM

## 2020-02-03 DIAGNOSIS — E78.5 HYPERLIPIDEMIA, UNSPECIFIED HYPERLIPIDEMIA TYPE: ICD-10-CM

## 2020-02-03 DIAGNOSIS — I71.20 THORACIC AORTIC ANEURYSM WITHOUT RUPTURE: Primary | ICD-10-CM

## 2020-02-03 NOTE — PROGRESS NOTES
Damir Jessica Mather     1970       Catalina Costa MD, [facility] [place]  Date of Visit-2/3/2020   PCP is Cheryl Beckett MD   [address] [facility] [place]  [facility]  HPI:  Greg Sands is a 54 y.o. female   The patient was last seen in 2014. Previous ascending aorta at 4.3 cm, echo in 2014 showed mild stenosis and mild to moderate aortic insufficiency with the root at 4.5 cm. She is here to follow-up on the size of her aorta  She had moved to [place] and has some records available with her today  Body mass index is 44.55 kg/m². She has significant morbid obesity  Today the patient reports that she has been having palpitations and shortness of breath upon exertion. She notes sensation of heart racing and skipping. She indicates that palpitations occur couple of times per week and last for about two minutes. The pt indicates that she has been having episodes of dizziness with standing. Patient denies any exertional chest pain, syncope, orthopnea, or paroxysmal nocturnal dyspnea. EKG 2/3/2020: SR within normal limits. Heart rate 84  ms  ms QRS duration 98 ms    Assessment/Plan:     1. Thoracic aortic aneurysm without rupture ([address]  Had a CT scan in [place] on 5/4/18 showing fusiform aneurismal dilation of the ascending thoracic aorta at 4.7 cm. She also had the incidental 1.45 cm thyroid nodule. There was also 1.7 cm nodule in the anterior mediastinum. The thyroid nodule was seen in 2013 but the mediastinum nodule was not previously seen. Questionable bicuspid aortic valve. Plan for echo and CT scan of the chest.   - AMB POC EKG ROUTINE W/ 12 LEADS, INTER & REP    2. Heart palpitations  Pt previously on BB. Seems to have continued palpitations we suspect that these are benign ectopics  3. Hyperlipidemia, unspecified hyperlipidemia type  Pt on Lipitor. Labs April 2018  ([place]). This LDL is fair and current meds can continue  4.  Obesity, morbid [facility])  She takes Lasix for some swelling  Body mass index is 44.55 kg/m². She is advised for weight loss and exercise. She understands is increases her risk of morbidity and mortality in the future  Follow up in one year. Future Appointments   Date Time Provider Pepe Roldan   2020  9:00 AM [place], [address]      Patient Instructions   You will be scheduled for an Echocardiogram    You will be scheduled for a Chest CT. Someone from central scheduling will be reaching out to you to schedule. If you do not hear from them in 1 week, please call [phone] to schedule      Key CAD CHF Meds             furosemide (LASIX) 20 mg tablet (Taking) TAKE ONE TABLET BY MOUTH DAILY AS NEEDED FOR FLUID/EDEMA            Impression:   1. Thoracic aortic aneurysm without rupture ([address]    2. Heart palpitations    3. Hyperlipidemia, unspecified hyperlipidemia type    4.  Obesity, morbid ([address]       Cardiac History:   Murmur  ECHO 09 =bicuspid AV mild AI, mild dilated ascending aorta  CT 09 TAA at 3 cm  CT 11 TAA 4.3 cm  Holter 09= NSR , sinus arrythmia, rare PACs, brieft EAT  Social Hx= quit tobacco , drinks alcohol,  Family Hx- father hx unknown   Update to above medical history add depression hypothyroidism edema patient takes multiple medications for wellbeing including Cymbalta Fioricet Maxalt Zolpidem  & tramadol    Allergies include Bactrim with rash codeine with severe migraines Augmentin with nausea and vomiting tape gives her contact dermatitis trazodone gives her swelling    History of brown recluse spider bite  with wound management at [address] cholecystectomy 2015 hysterectomy 2009 endometrial ablation 2006 tubal ligation 97,  1993, lipoma removal removal x2 in 2007    No prior cath blood transfusion or ulcers positive for GERD though    Social history quit smoking 21 years ago spouse does smoke she drinks socially 3 children 2 of them live in a house with her and her 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    Family history she is unsure of her family history mother 72 but she has no contact does not know her father's history has no siblings    12 system review of systems positive for shortness of breath on exertion irregular heartbeat dizziness swelling in the feet leg pains, headaches needing glasses, shortness of breath with exertion, frequency of urination and stress incontinence, heartburn digestive difficulty swallowing difficulties belching loose stools and weight gain, thyroid disorder and change in skin texture, last menstrual period 4/2009, aching joints back pain arthritis pain and leg stiffness in joints, left foot numbness difficulty sleeping depression anxiety, excessive bruising. Remainder of review of systems 12 systems reviewed please see documentation on scanned worksheet  see supplement sheet, initialed and to be scanned by staff  Past Medical History:   Diagnosis Date    Aortic aneurysm (thoracic)     Arthritis     Chronic knee pain 3/24/2010    Chronic low back pain 3/24/2010    Chronic pain     Depression 3/24/2010    Fibromyalgia 11/24/2014    Genital herpes 3/24/2010    GERD (gastroesophageal reflux disease) 3/24/2010    H/O endoscopic retrograde cholangiopancreatography [id number]    Hyperlipidemia 3/24/2010    Hypothyroidism 3/24/2010    Migraines 3/24/2010    Obesity 3/24/2010    Persistent disorder of initiating or maintaining sleep 3/24/2010    Plantar fasciitis 3/24/2010      Social Hx= reports that she has quit smoking. She has never used smokeless tobacco. She reports current alcohol use. She reports that she does not use drugs. Exam and Labs:    Visit Vitals  /70 (BP 1 Location: Left arm, BP Patient Position: Sitting)   Pulse 84   Resp 16   Ht 5' 8\" (1.727 m)   Wt 293 lb (132.9 kg)   SpO2 97%   BMI 44.55 kg/m²    @Constitutional:  NAD, comfortable  Head: [place],AT. Eyes: No scleral icterus. Neck:  Neck supple.  No JVD present. Throat: moist mucous membranes. Chest: Effort normal & normal respiratory excursion . Neurological: alert, conversant and oriented . Skin: Skin is not cold. No obvious systemic rash noted. Not diaphoretic. No erythema. Psychiatric:  Grossly normal mood and affect. Behavior appears normal. Extremities:  no clubbing or cyanosis. Abdomen: non distended    Lungs:breath sounds normal. No stridor. distress, wheezes or  Rales. Heart:    normal rate, regular rhythm, normal S1, S2, 1/6 systolic murmur, rubs, clicks or gallops , PMI non displaced. Edema: Edema is 1+ to 2+ up to the knees.   Lab Results   Component Value Date/Time    Cholesterol, total 190 03/24/2017 11:01 AM    HDL Cholesterol 49 03/24/2017 11:01 AM    LDL, calculated 124 (H) 03/24/2017 11:01 AM    Triglyceride 84 03/24/2017 11:01 AM    CHOL/HDL Ratio 3.9 09/15/2010 03:38 PM     Lab Results   Component Value Date/Time    Sodium 141 03/24/2017 11:01 AM    Potassium 4.6 03/24/2017 11:01 AM    Chloride 103 03/24/2017 11:01 AM    CO2 25 03/24/2017 11:01 AM    Anion gap 11 09/15/2010 03:38 PM    Glucose 94 03/24/2017 11:01 AM    BUN 13 03/24/2017 11:01 AM    Creatinine 0.91 03/24/2017 11:01 AM    BUN/Creatinine ratio 14 03/24/2017 11:01 AM    GFR est AA 89 03/24/2017 11:01 AM    GFR est non-AA 77 03/24/2017 11:01 AM    Calcium 9.1 03/24/2017 11:01 AM      Wt Readings from Last 3 Encounters:   02/03/20 293 lb (132.9 kg)   09/27/17 291 lb (132 kg)   04/04/17 292 lb (132.5 kg)      BP Readings from Last 3 Encounters:   02/03/20 120/70   09/27/17 132/85   04/04/17 120/84      Current Outpatient Medications   Medication Sig    levothyroxine (SYNTHROID) 88 mcg tablet TAKE ONE TABLET BY MOUTH DAILY BEFORE BREAKFAST    furosemide (LASIX) 20 mg tablet TAKE ONE TABLET BY MOUTH DAILY AS NEEDED FOR FLUID/EDEMA    butalbital-acetaminophen-caffeine (FIORICET, ESGIC) -40 mg per tablet TAKE ONE TABLET BY MOUTH EVERY 6 HOURS AS NEEDED FOR PAIN    diazePAM (VALIUM) 2 mg tablet TAKE 2 AND 1/2 TABLETS BY MOUTH EVERY 12 HOURS AS NEEDED FOR ANXIETY    cyclobenzaprine (FLEXERIL) 10 mg tablet Take 1 Tab by mouth three (3) times daily as needed for Muscle Spasm(s).  traMADol (ULTRAM) 50 mg tablet TAKE ONE TABLET BY MOUTH EVERY 8 HOURS AS NEEDED FOR PAIN    zolpidem (AMBIEN) 10 mg tablet TAKE ONE TABLET BY MOUTH EVERY NIGHT AT BEDTIME AS NEEDED    Cholecalciferol, Vitamin D3, (VITAMIN D3) 2,000 unit cap capsule Take one tablet daily    rizatriptan (MAXALT) 10 mg tablet TAKE ONE TABLET BY MOUTH AS NEEDED  MAY REPEAT IN 2 HOURS    DULoxetine (CYMBALTA) 60 mg capsule Take 1 Cap by mouth daily.  acyclovir (ZOVIRAX) 400 mg tablet Take 1 Tab by mouth five (5) times daily. Indications: as needed    fluticasone (FLONASE) 50 mcg/actuation nasal spray 2 Sprays by Both Nostrils route daily.  esomeprazole (NEXIUM) 20 mg capsule Take 1 Cap by mouth daily.  diclofenac potassium (CATAFLAM) 50 mg tablet Take 1 Tab by mouth three (3) times daily.  DULoxetine (CYMBALTA) 60 mg capsule TAKE ONE CAPSULE BY MOUTH DAILY    DULoxetine (CYMBALTA) 30 mg capsule Take 1 Cap by mouth daily. No current facility-administered medications for this visit. Impression see above.        Transcribed by Julissa Portillo, as dictated by Caren Soliz MD.

## 2020-02-03 NOTE — PROGRESS NOTES
Visit Vitals  /70 (BP 1 Location: Left arm, BP Patient Position: Sitting)   Pulse 84   Resp 16   Ht 5' 8\" (1.727 m)   Wt 293 lb (132.9 kg)   SpO2 97%   BMI 44.55 kg/m²

## 2020-02-03 NOTE — Clinical Note
2/3/20 Patient: Arnold Stewart YOB: 1970 Date of Visit: 2/3/2020 Coleman Rich MD 
VIA Dear Coleman Rich MD, Thank you for referring Ms. Kerry Ruiz to [facility] for evaluation. My notes for this consultation are attached. If you have questions, please do not hesitate to call me. I look forward to following your patient along with you.  
 
 
Sincerely, 
 
Baudilio Mccormick MD

## 2020-02-06 ENCOUNTER — TELEPHONE (OUTPATIENT)
Dept: CARDIOLOGY CLINIC | Age: 50
End: 2020-02-06

## 2020-02-06 NOTE — TELEPHONE ENCOUNTER
Pt called stating she can't go to [facility] because she has a balance. She would like to know if she can go some where else.     She can be reached at [phone]    [facility]

## 2020-02-07 ENCOUNTER — TELEPHONE (OUTPATIENT)
Dept: CARDIOLOGY CLINIC | Age: 50
End: 2020-02-07

## 2020-02-07 NOTE — TELEPHONE ENCOUNTER
Patient would like for you to mail her lab requisitions for [facility] to address file.      Phone: [phone]

## 2020-04-13 ENCOUNTER — OP HISTORICAL/CONVERTED ENCOUNTER (OUTPATIENT)
Dept: OTHER | Age: 50
End: 2020-04-13

## 2020-05-24 ENCOUNTER — ED HISTORICAL/CONVERTED ENCOUNTER (OUTPATIENT)
Dept: OTHER | Age: 50
End: 2020-05-24

## 2021-03-12 ENCOUNTER — PATIENT MESSAGE (OUTPATIENT)
Dept: CARDIOLOGY CLINIC | Age: 51
End: 2021-03-12

## 2021-03-19 NOTE — TELEPHONE ENCOUNTER
Faxed request to [facility] for chest CT results patient reports she had completed there last year.

## 2021-03-19 NOTE — TELEPHONE ENCOUNTER
----- Message from Lennox Montilla RN sent at 3/15/2021  7:34 AM EDT -----  Regarding: FW: Update Medical Information  Contact: [phone]    ----- Message -----  From: Celio Chambers RN  Sent: 3/12/2021   2:23 PM EDT  To: Lennox Montilla RN  Subject: FW: Update Medical Information                     ----- Message -----  From: Lawson Bence  Sent: 3/12/2021   2:22 PM EST  To: Hope Roberts Latimer  Subject: Update Medical Information                       I had coronavirus last April and I had a chest CT done at [facility] and since it's a [facility] facility can he take a look at that CT scan results and get back with me.  At my last visit with him he was concerned about a nodule from a CT scan I had in [place].

## 2021-03-19 NOTE — TELEPHONE ENCOUNTER
I [place] [facility]  Please get copy for us to review, thanks

## 2021-03-26 ENCOUNTER — TELEPHONE (OUTPATIENT)
Dept: CARDIOLOGY CLINIC | Age: 51
End: 2021-03-26

## 2022-01-26 ENCOUNTER — TELEPHONE (OUTPATIENT)
Dept: INTERNAL MEDICINE CLINIC | Age: 52
End: 2022-01-26

## 2022-01-26 NOTE — TELEPHONE ENCOUNTER
----- Message from To Bustillos sent at 1/26/2022 10:34 AM EST -----  Regarding: FW: New patient appointment    ----- Message -----  From: Radha Wan  Sent: 1/26/2022   1:16 AM EST  To: Brandy Nurse  Subject: New patient appointment                          I need to make a new patient appointment with any provider available

## 2022-02-24 ENCOUNTER — TELEPHONE (OUTPATIENT)
Dept: CARDIOLOGY CLINIC | Age: 52
End: 2022-02-24

## 2022-02-24 NOTE — TELEPHONE ENCOUNTER
Verified patient with two types of identifiers. Attempted to have PCP place referral for cardiology so patient can have echo and office visit however they state they cannot without seeing patient. Moved OV up to 3-15-22 and scheduled follow ups with cardiology after this. Advised she proceed to ED if she has further symptoms between now and then. Patient verbalized understanding and appreciation.      Future Appointments   Date Time Provider Pepe Roldan   3/15/2022  3:30 PM SAULO Mason BS AMB   3/16/2022  9:00 AM MICHELLE GAMBINO BS AMB   3/30/2022 10:00 AM Catalina Garcia MD [facility] BS AMB   4/27/2022  1:00 PM MD MAZIN Ventura BS AMB

## 2022-03-18 PROBLEM — Z79.899 CONTROLLED SUBSTANCE AGREEMENT SIGNED: Status: ACTIVE | Noted: 2017-03-24

## 2022-03-19 PROBLEM — F13.20 BENZODIAZEPINE DEPENDENCE (HCC): Status: ACTIVE | Noted: 2017-03-24

## 2022-03-20 PROBLEM — E66.01 OBESITY, MORBID (HCC): Status: ACTIVE | Noted: 2020-02-03

## 2022-03-29 ENCOUNTER — APPOINTMENT (OUTPATIENT)
Dept: GENERAL RADIOLOGY | Age: 52
End: 2022-03-29
Attending: EMERGENCY MEDICINE
Payer: COMMERCIAL

## 2022-03-29 ENCOUNTER — APPOINTMENT (OUTPATIENT)
Dept: CT IMAGING | Age: 52
End: 2022-03-29
Attending: EMERGENCY MEDICINE
Payer: COMMERCIAL

## 2022-03-29 ENCOUNTER — HOSPITAL ENCOUNTER (EMERGENCY)
Age: 52
Discharge: HOME | End: 2022-03-30
Attending: EMERGENCY MEDICINE
Payer: COMMERCIAL

## 2022-03-29 VITALS
RESPIRATION RATE: 20 BRPM | OXYGEN SATURATION: 100 % | HEART RATE: 91 BPM | SYSTOLIC BLOOD PRESSURE: 116 MMHG | DIASTOLIC BLOOD PRESSURE: 89 MMHG | WEIGHT: 293 LBS | HEIGHT: 69 IN | TEMPERATURE: 97.6 F | BODY MASS INDEX: 43.4 KG/M2

## 2022-03-29 DIAGNOSIS — R07.89 ATYPICAL CHEST PAIN: Primary | ICD-10-CM

## 2022-03-29 DIAGNOSIS — I71.20 THORACIC AORTIC ANEURYSM WITHOUT RUPTURE: ICD-10-CM

## 2022-03-29 LAB
ANION GAP SERPL CALC-SCNC: 9 MMOL/L (ref 5–15)
APTT PPP: 25.2 SEC (ref 22.1–31)
BASOPHILS # BLD: 0.1 K/UL (ref 0–0.1)
BASOPHILS NFR BLD: 1 % (ref 0–1)
BUN SERPL-MCNC: 22 MG/DL (ref 6–20)
BUN/CREAT SERPL: 18 (ref 12–20)
CA-I BLD-MCNC: 9.2 MG/DL (ref 8.5–10.1)
CHLORIDE SERPL-SCNC: 105 MMOL/L (ref 97–108)
CO2 SERPL-SCNC: 26 MMOL/L (ref 21–32)
CREAT SERPL-MCNC: 1.23 MG/DL (ref 0.55–1.02)
DIFFERENTIAL METHOD BLD: NORMAL
EOSINOPHIL # BLD: 0.4 K/UL (ref 0–0.4)
EOSINOPHIL NFR BLD: 5 % (ref 0–7)
ERYTHROCYTE [DISTWIDTH] IN BLOOD BY AUTOMATED COUNT: 13.9 % (ref 11.5–14.5)
GLUCOSE SERPL-MCNC: 107 MG/DL (ref 65–100)
HCT VFR BLD AUTO: 44 % (ref 35–47)
HGB BLD-MCNC: 13.8 G/DL (ref 11.5–16)
IMM GRANULOCYTES # BLD AUTO: 0 K/UL (ref 0–0.04)
IMM GRANULOCYTES NFR BLD AUTO: 0 % (ref 0–0.5)
INR PPP: 1 (ref 0.9–1.1)
LYMPHOCYTES # BLD: 3.4 K/UL (ref 0.8–3.5)
LYMPHOCYTES NFR BLD: 43 % (ref 12–49)
MCH RBC QN AUTO: 29.6 PG (ref 26–34)
MCHC RBC AUTO-ENTMCNC: 31.4 G/DL (ref 30–36.5)
MCV RBC AUTO: 94.2 FL (ref 80–99)
MONOCYTES # BLD: 0.4 K/UL (ref 0–1)
MONOCYTES NFR BLD: 5 % (ref 5–13)
NEUTS SEG # BLD: 3.6 K/UL (ref 1.8–8)
NEUTS SEG NFR BLD: 46 % (ref 32–75)
PLATELET # BLD AUTO: 306 K/UL (ref 150–400)
PMV BLD AUTO: 10.3 FL (ref 8.9–12.9)
POTASSIUM SERPL-SCNC: 4.4 MMOL/L (ref 3.5–5.1)
PROTHROMBIN TIME: 9.7 SEC (ref 9–11.1)
RBC # BLD AUTO: 4.67 M/UL (ref 3.8–5.2)
SODIUM SERPL-SCNC: 140 MMOL/L (ref 136–145)
THERAPEUTIC RANGE,PTTT: NORMAL SEC (ref 82–109)
TROPONIN-HIGH SENSITIVITY: 15 NG/L (ref 0–51)
WBC # BLD AUTO: 7.9 K/UL (ref 3.6–11)

## 2022-03-29 PROCEDURE — 85025 COMPLETE CBC W/AUTO DIFF WBC: CPT

## 2022-03-29 PROCEDURE — 36415 COLL VENOUS BLD VENIPUNCTURE: CPT

## 2022-03-29 PROCEDURE — 74011250636 HC RX REV CODE- 250/636: Performed by: EMERGENCY MEDICINE

## 2022-03-29 PROCEDURE — 84484 ASSAY OF TROPONIN QUANT: CPT

## 2022-03-29 PROCEDURE — 99285 EMERGENCY DEPT VISIT HI MDM: CPT

## 2022-03-29 PROCEDURE — 74011000636 HC RX REV CODE- 636: Performed by: EMERGENCY MEDICINE

## 2022-03-29 PROCEDURE — 85730 THROMBOPLASTIN TIME PARTIAL: CPT

## 2022-03-29 PROCEDURE — 93005 ELECTROCARDIOGRAM TRACING: CPT

## 2022-03-29 PROCEDURE — 71045 X-RAY EXAM CHEST 1 VIEW: CPT

## 2022-03-29 PROCEDURE — 80048 BASIC METABOLIC PNL TOTAL CA: CPT

## 2022-03-29 PROCEDURE — 71275 CT ANGIOGRAPHY CHEST: CPT

## 2022-03-29 PROCEDURE — 85610 PROTHROMBIN TIME: CPT

## 2022-03-29 RX ORDER — TOPIRAMATE 50 MG/1
50 TABLET, FILM COATED ORAL DAILY
COMMUNITY

## 2022-03-29 RX ADMIN — SODIUM CHLORIDE 1000 ML: 9 INJECTION, SOLUTION INTRAVENOUS at 23:46

## 2022-03-30 DIAGNOSIS — I77.810 ASCENDING AORTA DILATATION (HCC): Primary | ICD-10-CM

## 2022-03-30 LAB
ATRIAL RATE: 89 BPM
CALCULATED P AXIS, ECG09: 50 DEGREES
CALCULATED R AXIS, ECG10: 20 DEGREES
CALCULATED T AXIS, ECG11: 46 DEGREES
DIAGNOSIS, 93000: NORMAL
P-R INTERVAL, ECG05: 156 MS
Q-T INTERVAL, ECG07: 352 MS
QRS DURATION, ECG06: 92 MS
QTC CALCULATION (BEZET), ECG08: 428 MS
TROPONIN-HIGH SENSITIVITY: 13 NG/L (ref 0–51)
VENTRICULAR RATE, ECG03: 89 BPM

## 2022-03-30 PROCEDURE — 84484 ASSAY OF TROPONIN QUANT: CPT

## 2022-03-30 PROCEDURE — 74011000636 HC RX REV CODE- 636: Performed by: EMERGENCY MEDICINE

## 2022-03-30 PROCEDURE — 36415 COLL VENOUS BLD VENIPUNCTURE: CPT

## 2022-03-30 RX ADMIN — IOPAMIDOL 100 ML: 755 INJECTION, SOLUTION INTRAVENOUS at 00:14

## 2022-03-30 NOTE — ED TRIAGE NOTES
Patient presents with chest pain- mid chest. Cardiac history- thoracic aneurysm. SOB with exertion.  Symptoms began this afternoon while in shower

## 2022-03-30 NOTE — ED PROVIDER NOTES
EMERGENCY DEPARTMENT HISTORY AND PHYSICAL EXAM      Date: 3/29/2022  Patient Name: Makayla Crook    History of Presenting Illness     Chief Complaint   Patient presents with    Chest Pain    Shortness of Breath       History Provided By: Patient    HPI: Makayla Crook, 54 y.o. female   presents to the ED with cc of chest pain. Patient complains of intermittent episode of substernal chest discomfort for last several days. Patient came to emergency room due to recurrence of the pain this afternoon which has been more persistent. The pain is not associated radiation, nausea, diaphoresis, palpitation but with mild shortness of breath. Patient has no history of coronary artery disease but has a history of thoracic ascending aneurysm that has been followed by her cardiologist.  No URI symptoms. No fever chills. No abdominal pain. No nausea vomiting diarrhea. PCP: Sherie Mccormick MD    No current facility-administered medications on file prior to encounter. Current Outpatient Medications on File Prior to Encounter   Medication Sig Dispense Refill    topiramate (Topamax) 50 mg tablet Take 50 mg by mouth two (2) times a day.  levothyroxine (SYNTHROID) 88 mcg tablet TAKE ONE TABLET BY MOUTH DAILY BEFORE BREAKFAST 30 Tab 0    Cholecalciferol, Vitamin D3, (VITAMIN D3) 2,000 unit cap capsule Take one tablet daily 30 Cap 5    DULoxetine (CYMBALTA) 60 mg capsule Take 1 Cap by mouth daily. 30 Cap 3    esomeprazole (NEXIUM) 20 mg capsule Take 1 Cap by mouth daily.  30 Cap 5    furosemide (LASIX) 20 mg tablet TAKE ONE TABLET BY MOUTH DAILY AS NEEDED FOR FLUID/EDEMA 30 Tab 0    butalbital-acetaminophen-caffeine (FIORICET, ESGIC) -40 mg per tablet TAKE ONE TABLET BY MOUTH EVERY 6 HOURS AS NEEDED FOR PAIN 30 Tab 1    diazePAM (VALIUM) 2 mg tablet TAKE 2 AND 1/2 TABLETS BY MOUTH EVERY 12 HOURS AS NEEDED FOR ANXIETY 20 Tab 0    cyclobenzaprine (FLEXERIL) 10 mg tablet Take 1 Tab by mouth three (3) times daily as needed for Muscle Spasm(s). 30 Tab 5    traMADol (ULTRAM) 50 mg tablet TAKE ONE TABLET BY MOUTH EVERY 8 HOURS AS NEEDED FOR PAIN 90 Tab 0    zolpidem (AMBIEN) 10 mg tablet TAKE ONE TABLET BY MOUTH EVERY NIGHT AT BEDTIME AS NEEDED 30 Tab 0    rizatriptan (MAXALT) 10 mg tablet TAKE ONE TABLET BY MOUTH AS NEEDED  MAY REPEAT IN 2 HOURS 12 Tab 32    acyclovir (ZOVIRAX) 400 mg tablet Take 1 Tab by mouth five (5) times daily. Indications: as needed 30 Tab 0    fluticasone (FLONASE) 50 mcg/actuation nasal spray 2 Sprays by Both Nostrils route daily. 1 Bottle 5       Past History     Past Medical History:  Past Medical History:   Diagnosis Date    Aortic aneurysm (thoracic)     Arthritis     Chronic knee pain 3/24/2010    Chronic low back pain 3/24/2010    Chronic pain     Depression 3/24/2010    Fibromyalgia 2014    Genital herpes 3/24/2010    GERD (gastroesophageal reflux disease) 3/24/2010    H/O endoscopic retrograde cholangiopancreatography [id number]    Hyperlipidemia 3/24/2010    Hypothyroidism 3/24/2010    Migraines 3/24/2010    Obesity 3/24/2010    Persistent disorder of initiating or maintaining sleep 3/24/2010    Plantar fasciitis 3/24/2010       Past Surgical History:  Past Surgical History:   Procedure Laterality Date    HX  SECTION     Reford Yung CHOLECYSTECTOMY  [id number]    HX CHOLECYSTECTOMY  2013    emergency cholectomy    HX GYN  [id number]    hysterectomy,endometrial ablation    HX HYSTERECTOMY         Family History:  History reviewed. No pertinent family history. Social History:  Social History     Tobacco Use    Smoking status: Former Smoker    Smokeless tobacco: Never Used    Tobacco comment: quit 9 yrs ago   Substance Use Topics    Alcohol use: Not Currently     Comment: Socially    Drug use: No     Types: OTC, Prescription       Allergies:   Allergies   Allergen Reactions    Augmentin [Amoxicillin-Pot Clavulanate] Nausea and Vomiting    Avelox [Moxifloxacin] Other (comments)     \"Gave her really bad thrush\"    Bactrim [Sulfamethoprim] Rash    Codeine Other (comments)    Percocet [Oxycodone-Acetaminophen] Other (comments)     \"Severe Migraines\"         Review of Systems   Review of Systems   Constitutional: Negative for activity change, appetite change, chills and fever. HENT: Negative for sore throat. Eyes: Negative for discharge. Respiratory: Negative for cough. Cardiovascular: Positive for chest pain. Gastrointestinal: Negative for abdominal pain, diarrhea and vomiting. Genitourinary: Negative for dysuria. Musculoskeletal: Negative for back pain. Skin: Negative for rash. Neurological: Negative for headaches. Hematological: Negative for adenopathy. Psychiatric/Behavioral: Negative for suicidal ideas. All other systems reviewed and are negative. Physical Exam   Physical Exam  Vitals and nursing note reviewed. Constitutional:       General: She is not in acute distress. Appearance: Normal appearance. She is not ill-appearing, toxic-appearing or diaphoretic. HENT:      Head: Normocephalic and atraumatic. Nose: Nose normal.      Mouth/Throat:      Mouth: Mucous membranes are moist.   Eyes:      Conjunctiva/sclera: Conjunctivae normal.   Cardiovascular:      Rate and Rhythm: Normal rate and regular rhythm. Heart sounds: Normal heart sounds. Pulmonary:      Effort: Pulmonary effort is normal.      Breath sounds: Normal breath sounds. Abdominal:      General: Abdomen is flat. Bowel sounds are normal. There is no distension. Palpations: Abdomen is soft. Tenderness: There is no abdominal tenderness. There is no guarding or rebound. Musculoskeletal:      Cervical back: Neck supple. Right lower leg: No edema. Left lower leg: No edema. Skin:     General: Skin is warm and dry. Neurological:      General: No focal deficit present.       Mental Status: She is alert and oriented to person, place, and time. Psychiatric:         Behavior: Behavior normal.         Thought Content: Thought content normal.         Diagnostic Study Results     Labs -     Recent Results (from the past 12 hour(s))   CBC WITH AUTOMATED DIFF    Collection Time: 03/29/22 10:30 PM   Result Value Ref Range    WBC 7.9 3.6 - 11.0 K/uL    RBC 4.67 3.80 - 5.20 M/uL    HGB 13.8 11.5 - 16.0 g/dL    HCT 44.0 35.0 - 47.0 %    MCV 94.2 80.0 - 99.0 FL    MCH 29.6 26.0 - 34.0 PG    MCHC 31.4 30.0 - 36.5 g/dL    RDW 13.9 11.5 - 14.5 %    PLATELET 310 254 - 350 K/uL    MPV 10.3 8.9 - 12.9 FL    NEUTROPHILS 46 32 - 75 %    LYMPHOCYTES 43 12 - 49 %    MONOCYTES 5 5 - 13 %    EOSINOPHILS 5 0 - 7 %    BASOPHILS 1 0 - 1 %    IMMATURE GRANULOCYTES 0 0.0 - 0.5 %    ABS. NEUTROPHILS 3.6 1.8 - 8.0 K/UL    ABS. LYMPHOCYTES 3.4 0.8 - 3.5 K/UL    ABS. MONOCYTES 0.4 0.0 - 1.0 K/UL    ABS. EOSINOPHILS 0.4 0.0 - 0.4 K/UL    ABS. BASOPHILS 0.1 0.0 - 0.1 K/UL    ABS. IMM.  GRANS. 0.0 0.00 - 0.04 K/UL    DF AUTOMATED     METABOLIC PANEL, BASIC    Collection Time: 03/29/22 10:30 PM   Result Value Ref Range    Sodium 140 136 - 145 mmol/L    Potassium 4.4 3.5 - 5.1 mmol/L    Chloride 105 97 - 108 mmol/L    CO2 26 21 - 32 mmol/L    Anion gap 9 5 - 15 mmol/L    Glucose 107 (H) 65 - 100 mg/dL    BUN 22 (H) 6 - 20 mg/dL    Creatinine 1.23 (H) 0.55 - 1.02 mg/dL    BUN/Creatinine ratio 18 12 - 20      GFR est AA 56 (L) >60 ml/min/1.73m2    GFR est non-AA 46 (L) >60 ml/min/1.73m2    Calcium 9.2 8.5 - 10.1 mg/dL   TROPONIN-HIGH SENSITIVITY    Collection Time: 03/29/22 10:50 PM   Result Value Ref Range    Troponin-High Sensitivity 15 0 - 51 ng/L   PROTHROMBIN TIME + INR    Collection Time: 03/29/22 10:50 PM   Result Value Ref Range    Prothrombin time 9.7 9.0 - 11.1 sec    INR 1.0 0.9 - 1.1     PTT    Collection Time: 03/29/22 10:50 PM   Result Value Ref Range    aPTT 25.2 22.1 - 31.0 sec    aPTT, therapeutic range   82 - 109 sec   TROPONIN-HIGH SENSITIVITY Collection Time: 03/30/22  1:05 AM   Result Value Ref Range    Troponin-High Sensitivity 13 0 - 51 ng/L       Radiologic Studies -   CTA CHEST W OR W WO CONT   Final Result   No evidence of pulmonary embolism or acute aortic abnormalities. Stable aneurysmal dilatation of the ascending aorta. Maximum diameter is 5 cm. XR CHEST PORT   Final Result   Negative. CT Results  (Last 48 hours)               03/30/22 0002  CTA CHEST W OR W WO CONT Final result    Impression:  No evidence of pulmonary embolism or acute aortic abnormalities. Stable aneurysmal dilatation of the ascending aorta. Maximum diameter is 5 cm. Narrative:  PROCEDURE: CTA CHEST W OR W WO CONT       HISTORY:Chest pain with shortness of breath. History of ascending thoracic   aneurysm, 4.7 cm in diameter and 2018 according to the patient. COMPARISON:Unenhanced chest CT 14 April 2020       Department policy stipulates all CT scans at this facility are performed using   dose reduction optimization techniques as appropriate to the performed exam,   including the following: Automated exposure control, adjustments of the mA   and/or KVP according to the patient size, and the use of iterative   reconstruction technique. TECHNIQUE: Postcontrast CT angiogram of the chest is performed with 3-D   reconstructions and maximum intensity projection images (MIPS) generated. Study   done with IV contrast.       LIMITATIONS: None       LUNG PARENCHYMA/PLEURA: Normal   TRACHEA/BRONCHI: Normal   PULMONARY VESSELS: Normal. No evidence of pulmonary artery filling defects. MEDIASTINUM: Single anterior mediastinal lymph node measuring 16 mm in short   axis, unchanged from the prior study. HEART: Normal   AORTA/GREAT VESSELS: Ascending aorta remains dilated up to 5 cm in diameter.    This is unchanged from the 2020 exam. At the level of the brachiocephalic   vascular origins the diameter is normal. Descending aorta is normal in diameter. There is no evidence of dissection. ESOPHAGUS: Normal   AXILLAE: Normal   BONES/TISSUES: No acute abnormality       UPPER ABDOMEN: No acute process   OTHER: None               CXR Results  (Last 48 hours)               03/29/22 2247  XR CHEST PORT Final result    Impression:  Negative. Narrative:  AP upright view of the chest compared to prior exam dated 4/13/2020. Heart size   is normal. Lungs are clear of infiltrate. No pulmonary nodule or pleural   effusion is seen. No bony abnormalities are identified. Medical Decision Making   I am the first provider for this patient. I reviewed the vital signs, available nursing notes, past medical history, past surgical history, family history and social history. Vital Signs-Reviewed the patient's vital signs. Patient Vitals for the past 12 hrs:   Temp Pulse Resp BP SpO2   03/29/22 2237 97.6 °F (36.4 °C) 91 20 116/89 100 %       Records Reviewed:     Provider Notes (Medical Decision Making):   EKG is without signs of STEMI. 2 negative troponin. CTA shows a stable ascending thoracic aneurysm without dissection. Patient has appointment to see her cardiologist today. Patient was discharged in stable condition    ED Course:   Initial assessment performed. The patients presenting problems have been discussed, and they are in agreement with the care plan formulated and outlined with them. I have encouraged them to ask questions as they arise throughout their visit. No apparent distress. Chest pain is minimal.  No shortness of breath. No respiratory distress      PROCEDURES      Disposition: Condition stable   DC- Adult Discharges: All of the diagnostic tests were reviewed and questions answered. Diagnosis, care plan and treatment options were discussed. understand instructions and will follow up as directed. The patients results have been reviewed with them. They have been counseled regarding their diagnosis.   The patient verbally convey understanding and agreement of the signs, symptoms, diagnosis, treatment and prognosis and additionally agrees to follow up as recommended. They also agree with the care-plan and convey that all of their questions have been answered. I have also put together some discharge instructions for them that include: 1) educational information regarding their diagnosis, 2) how to care for their diagnosis at home, as well a 3) list of reasons why they would want to return to the ED prior to their follow-up appointment, should their condition change. PLAN:  1. Current Discharge Medication List        2. Follow-up Information     Follow up With Specialties Details Why Contact Info    Follow up with your primary care physician  Go in 3 days follow up with your cardiologist as planned         Return to ED if worse     Diagnosis     Clinical Impression:   1. Atypical chest pain    2. Thoracic aortic aneurysm without rupture [facility])        Please note that this dictation was completed with [employer], the computer voice recognition software. Quite often unanticipated grammatical, syntax, homophones, and other interpretive errors are inadvertently transcribed by the computer software. Please disregard these errors. Please excuse any errors that have escaped final proofreading. Thank you.

## 2022-03-30 NOTE — ED NOTES
Patient discharged home. A/o x4. No sign of distress. Ambulated without assistance. No further questions verbalized. Patient given privacy before discharge to put on undergarments removed for CT.

## 2022-04-11 ENCOUNTER — ANCILLARY PROCEDURE (OUTPATIENT)
Dept: CARDIOLOGY CLINIC | Age: 52
End: 2022-04-11
Payer: COMMERCIAL

## 2022-04-11 VITALS
BODY MASS INDEX: 43.4 KG/M2 | SYSTOLIC BLOOD PRESSURE: 140 MMHG | WEIGHT: 293 LBS | HEIGHT: 69 IN | DIASTOLIC BLOOD PRESSURE: 100 MMHG

## 2022-04-11 DIAGNOSIS — I71.20 THORACIC AORTIC ANEURYSM WITHOUT RUPTURE: ICD-10-CM

## 2022-04-11 PROCEDURE — 93306 TTE W/DOPPLER COMPLETE: CPT | Performed by: SPECIALIST

## 2022-04-18 ENCOUNTER — OFFICE VISIT (OUTPATIENT)
Dept: CARDIOLOGY CLINIC | Age: 52
End: 2022-04-18
Payer: COMMERCIAL

## 2022-04-18 VITALS
DIASTOLIC BLOOD PRESSURE: 102 MMHG | BODY MASS INDEX: 43.4 KG/M2 | WEIGHT: 293 LBS | RESPIRATION RATE: 18 BRPM | HEART RATE: 92 BPM | OXYGEN SATURATION: 98 % | HEIGHT: 69 IN | SYSTOLIC BLOOD PRESSURE: 128 MMHG

## 2022-04-18 DIAGNOSIS — I10 PRIMARY HYPERTENSION: Primary | ICD-10-CM

## 2022-04-18 DIAGNOSIS — E55.9 VITAMIN D DEFICIENCY: ICD-10-CM

## 2022-04-18 DIAGNOSIS — E66.01 CLASS 3 SEVERE OBESITY DUE TO EXCESS CALORIES IN ADULT, UNSPECIFIED BMI, UNSPECIFIED WHETHER SERIOUS COMORBIDITY PRESENT (HCC): ICD-10-CM

## 2022-04-18 DIAGNOSIS — E78.5 HYPERLIPIDEMIA, UNSPECIFIED HYPERLIPIDEMIA TYPE: ICD-10-CM

## 2022-04-18 DIAGNOSIS — I71.20 THORACIC AORTIC ANEURYSM WITHOUT RUPTURE: ICD-10-CM

## 2022-04-18 DIAGNOSIS — E66.01 OBESITY, MORBID (HCC): ICD-10-CM

## 2022-04-18 PROCEDURE — 99214 OFFICE O/P EST MOD 30 MIN: CPT | Performed by: NURSE PRACTITIONER

## 2022-04-18 NOTE — PROGRESS NOTES
ER on 29th   Cardiothoracic surgeon cholo on [address] He is a 54 y.o. female    Chief Complaint   Patient presents with    Follow-up       Chest pain No    SOB Yes    Dizziness No    Swelling Yes    Refills No    Visit Vitals  BP (!) 128/102 (BP 1 Location: Right upper arm, BP Patient Position: Sitting, BP Cuff Size: Adult)   Pulse 92   Resp 18   Ht 5' 9\" (1.753 m)   Wt 307 lb (139.3 kg)   SpO2 98%   BMI 45.34 kg/m²       1. Have you been to the ER, urgent care clinic since your last visit? Hospitalized since your last visit? 3/29    2. Have you seen or consulted any other health care providers outside of the [address] [place] [address] since your last visit? Include any pap smears or colon screening.   No

## 2022-04-18 NOTE — PATIENT INSTRUCTIONS
Start checking BP and HR at home. I am going to refer you to sleep medicine to check again for sleep apnea and treat it with CPAP machine if need e. Your job while we are waiting for the surgeon to see you and further evaluate your aorta, is to work on getting 10K steps per day, tracking your food intake/calores (My[employer]ItnessPal) and drinking at least 80 ounces of fluid per day.

## 2022-04-18 NOTE — PROGRESS NOTES
[address], [address] [facility]    Office [phone]  Fax [phone]       Oneil Wing is a 54 y.o. female was last seen by Dr. Valentin Shay 2/2020. Most recently she was seen in the [facility] ER for evaluation of chest pain and shortness of breath. ER D/C summary reviewed. EKG without evidence of ischemia, CE's negative x 2. CTA of her chest was negative for PE or actue aortic abnormalities, however it noted \"Stable aneurysmal dilatation of the ascending aorta. Maximum diameter is 5 cm\". Assessment  Encounter Diagnoses   Name Primary?  Primary hypertension Yes    Thoracic aortic aneurysm without rupture (HCC)     Class 3 severe obesity due to excess calories in adult, unspecified BMI, unspecified whether serious comorbidity present ([address]     Hyperlipidemia, unspecified hyperlipidemia type     Vitamin D deficiency     Obesity, morbid ([address]        Recommendations:    1. Thoracic aortic aneurysm without rupture (HCC) - CT scan in [place] on 5/4/18 showing fusiform aneurismal dilation of the ascending thoracic aorta at 4.7 cm. Repeat CT scan in the ER showed enlargement of aneurysm, now at 5cm. BP remains stable in the office today. No recurrent chest pains.    - Referral to cardiothoracic surgeon scheduled for Thursday.    - Discussed that she refrain from rigorous exercise - walking ok. - Ensure hydration  - Continue to monitor HR and BP at home  - Advised that she seek immediate medical attention or call 911 should chest pain recur.         2. Fatigue/PITTMAN - likely multifactorial in the setting of morbid obesity and deconditioning and untreated NICKY. - Discussed improved nutrition to optimize weight loss after surgical evaluation  - Will place referral for repeat sleep medicine screening. Return to clinic to see Dr. Valentin Shay in 6-8 weeks. Sooner PRN.       She was encouraged to continue current medications and  call with any new complaints or concerns. Winnie Nolasco NP        Subjective:  No recurrent chest pain since discharge. Sleep study done in 2005/2006 - she recalls that it was abnormal.  They referred her for an EEG. She was then placed on Amitriptyline and Ambien but was never prescribed CPAP. She notes that she continues to snore and has daytime fatigue. No recent follow up with PCP - scheduled for new patient evaluation on 4/29/22. Cardiac testing  Murmur  ECHO 2-18-09 =bicuspid AV mild AI, mild dilated ascending aorta  CT 1-24-09 TAA at 3 cm  CT 11-29-11 TAA 4.3 cm  Holter 2-18-09= NSR , sinus arrythmia, rare PACs, brieft EAT  Social Hx= quit tobacco 2003, drinks alcohol,  Family Hx- father hx unknown     ECHO ADULT COMPLETE 04/19/2022 4/19/2022    Interpretation Summary    Left Ventricle: Normal left ventricular systolic function. EF by 2D Simpsons Biplane is 68%. Left ventricle size is normal. Normal wall thickness. Normal wall motion.   Aortic Valve: Thickened cusp. Sclerosis of the aortic valve cusp. Cannot exclude bicuspid morphology. Mild regurgitation.   Aorta: Dilated ascending aorta (5.0 cm). Signed by: Sheria Gottron, MD on 4/19/2022  5:05 PM    Past Medical History:   Diagnosis Date    Aortic aneurysm (thoracic)     Arthritis     Chronic knee pain 3/24/2010    Chronic low back pain 3/24/2010    Chronic pain     Depression 3/24/2010    Fibromyalgia 11/24/2014    Genital herpes 3/24/2010    GERD (gastroesophageal reflux disease) 3/24/2010    H/O endoscopic retrograde cholangiopancreatography [id number]    Hyperlipidemia 3/24/2010    Hypothyroidism 3/24/2010    Migraines 3/24/2010    Obesity 3/24/2010    Persistent disorder of initiating or maintaining sleep 3/24/2010    Plantar fasciitis 3/24/2010        Current Outpatient Medications   Medication Sig Dispense Refill    topiramate (Topamax) 50 mg tablet Take 50 mg by mouth daily.       levothyroxine (SYNTHROID) 88 mcg tablet TAKE ONE TABLET BY MOUTH DAILY BEFORE BREAKFAST 30 Tab 0    cyclobenzaprine (FLEXERIL) 10 mg tablet Take 1 Tab by mouth three (3) times daily as needed for Muscle Spasm(s). 30 Tab 5    Cholecalciferol, Vitamin D3, (VITAMIN D3) 2,000 unit cap capsule Take one tablet daily 30 Cap 5    rizatriptan (MAXALT) 10 mg tablet TAKE ONE TABLET BY MOUTH AS NEEDED  MAY REPEAT IN 2 HOURS 12 Tab 32    DULoxetine (CYMBALTA) 60 mg capsule Take 1 Cap by mouth daily. 30 Cap 3    acyclovir (ZOVIRAX) 400 mg tablet Take 1 Tab by mouth five (5) times daily. Indications: as needed 30 Tab 0    fluticasone (FLONASE) 50 mcg/actuation nasal spray 2 Sprays by Both Nostrils route daily. 1 Bottle 5    esomeprazole (NEXIUM) 20 mg capsule Take 1 Cap by mouth daily. 30 Cap 5    furosemide (LASIX) 20 mg tablet TAKE ONE TABLET BY MOUTH DAILY AS NEEDED FOR FLUID/EDEMA 30 Tab 0    butalbital-acetaminophen-caffeine (FIORICET, ESGIC) -40 mg per tablet TAKE ONE TABLET BY MOUTH EVERY 6 HOURS AS NEEDED FOR PAIN 30 Tab 1    diazePAM (VALIUM) 2 mg tablet TAKE 2 AND 1/2 TABLETS BY MOUTH EVERY 12 HOURS AS NEEDED FOR ANXIETY 20 Tab 0    traMADol (ULTRAM) 50 mg tablet TAKE ONE TABLET BY MOUTH EVERY 8 HOURS AS NEEDED FOR PAIN 90 Tab 0    zolpidem (AMBIEN) 10 mg tablet TAKE ONE TABLET BY MOUTH EVERY NIGHT AT BEDTIME AS NEEDED 30 Tab 0       Allergies   Allergen Reactions    Augmentin [Amoxicillin-Pot Clavulanate] Nausea and Vomiting    Avelox [Moxifloxacin] Other (comments)     \"Gave her really bad thrush\"    Bactrim [Sulfamethoprim] Rash    Codeine Other (comments)    Percocet [Oxycodone-Acetaminophen] Other (comments)     \"Severe Migraines\"      Review of Systems  Pertinent per HPI   Constitutional: Negative for fever, chills, malaise/fatigue and diaphoresis. Respiratory: Negative for cough, hemoptysis, sputum production, shortness of breath and wheezing.     Cardiovascular: Negative for chest pain, palpitations, orthopnea, claudication, leg swelling and PND. Gastrointestinal: Negative for heartburn, nausea, vomiting, blood in stool and melena. Genitourinary: Negative for dysuria and flank pain. Musculoskeletal: Negative for joint pain and back pain. Skin: Negative for rash. Neurological: Negative for focal weakness, seizures, loss of consciousness, weakness and headaches. Endo/Heme/Allergies: Does not bruise/bleed easily. Psychiatric/Behavioral: Negative for memory loss. The patient does not have insomnia. Physical Exam  Visit Vitals  BP (!) 128/102 (BP 1 Location: Right upper arm, BP Patient Position: Sitting, BP Cuff Size: Adult)   Pulse 92   Resp 18   Ht 5' 9\" (1.753 m)   Wt 307 lb (139.3 kg)   SpO2 98%   BMI 45.34 kg/m²     Wt Readings from Last 3 Encounters:   04/18/22 307 lb (139.3 kg)   04/11/22 300 lb (136.1 kg)   03/29/22 300 lb (136.1 kg)      General - well developed well nourished  Neck - JVP normal, thyroid nl  Cardiac - normal S1,S2, no murmurs, rubs or gallops. No clicks  Vascular - carotids without bruits, radials, femorals and pedal pulses equal bilateral  Lungs - clear to auscultation bilaterals, no rales ,wheezing or rhonchi  Abd - soft nontender, obese  Extremities - generalized bilateral lower extremity edema.    Skin - no rash  Neuro - nonfocal  Psych - normal mood and affect      Cabrera Zuleta NP

## 2022-04-19 LAB
ECHO AO ASC DIAM: 5 CM
ECHO AO ASCENDING AORTA INDEX: 2.04 CM/M2
ECHO AO ROOT DIAM: 3.6 CM
ECHO AO ROOT INDEX: 1.47 CM/M2
ECHO AV AREA PEAK VELOCITY: 2 CM2
ECHO AV AREA VTI: 2.2 CM2
ECHO AV AREA/BSA PEAK VELOCITY: 0.8 CM2/M2
ECHO AV AREA/BSA VTI: 0.9 CM2/M2
ECHO AV MEAN GRADIENT: 15 MMHG
ECHO AV MEAN VELOCITY: 1.8 M/S
ECHO AV PEAK GRADIENT: 28 MMHG
ECHO AV PEAK VELOCITY: 2.6 M/S
ECHO AV VELOCITY RATIO: 0.42
ECHO AV VTI: 45 CM
ECHO EST RA PRESSURE: 3 MMHG
ECHO LA DIAMETER INDEX: 1.43 CM/M2
ECHO LA DIAMETER: 3.5 CM
ECHO LA TO AORTIC ROOT RATIO: 0.97
ECHO LA VOL 2C: 66 ML (ref 22–52)
ECHO LA VOL 4C: 49 ML (ref 22–52)
ECHO LA VOLUME AREA LENGTH: 64 ML
ECHO LA VOLUME INDEX A2C: 27 ML/M2 (ref 16–34)
ECHO LA VOLUME INDEX A4C: 20 ML/M2 (ref 16–34)
ECHO LA VOLUME INDEX AREA LENGTH: 26 ML/M2 (ref 16–34)
ECHO LV E' LATERAL VELOCITY: 10 CM/S
ECHO LV E' SEPTAL VELOCITY: 11 CM/S
ECHO LV EDV A2C: 122 ML
ECHO LV EDV A4C: 131 ML
ECHO LV EDV BP: 129 ML (ref 56–104)
ECHO LV EDV INDEX A4C: 53 ML/M2
ECHO LV EDV INDEX BP: 53 ML/M2
ECHO LV EDV NDEX A2C: 50 ML/M2
ECHO LV EJECTION FRACTION A2C: 68 %
ECHO LV EJECTION FRACTION A4C: 67 %
ECHO LV EJECTION FRACTION BIPLANE: 68 % (ref 55–100)
ECHO LV ESV A2C: 39 ML
ECHO LV ESV A4C: 43 ML
ECHO LV ESV BP: 41 ML (ref 19–49)
ECHO LV ESV INDEX A2C: 16 ML/M2
ECHO LV ESV INDEX A4C: 18 ML/M2
ECHO LV ESV INDEX BP: 17 ML/M2
ECHO LV FRACTIONAL SHORTENING: 31 % (ref 28–44)
ECHO LV INTERNAL DIMENSION DIASTOLE INDEX: 1.84 CM/M2
ECHO LV INTERNAL DIMENSION DIASTOLIC: 4.5 CM (ref 3.9–5.3)
ECHO LV INTERNAL DIMENSION SYSTOLIC INDEX: 1.27 CM/M2
ECHO LV INTERNAL DIMENSION SYSTOLIC: 3.1 CM
ECHO LV IVSD: 0.9 CM (ref 0.6–0.9)
ECHO LV MASS 2D: 132.8 G (ref 67–162)
ECHO LV MASS INDEX 2D: 54.2 G/M2 (ref 43–95)
ECHO LV POSTERIOR WALL DIASTOLIC: 0.9 CM (ref 0.6–0.9)
ECHO LV RELATIVE WALL THICKNESS RATIO: 0.4
ECHO LVOT AREA: 4.5 CM2
ECHO LVOT AV VTI INDEX: 0.47
ECHO LVOT DIAM: 2.4 CM
ECHO LVOT MEAN GRADIENT: 3 MMHG
ECHO LVOT PEAK GRADIENT: 5 MMHG
ECHO LVOT PEAK VELOCITY: 1.1 M/S
ECHO LVOT STROKE VOLUME INDEX: 39.3 ML/M2
ECHO LVOT SV: 96.3 ML
ECHO LVOT VTI: 21.3 CM
ECHO MV A VELOCITY: 0.89 M/S
ECHO MV AREA PHT: 2.7 CM2
ECHO MV AREA VTI: 3.9 CM2
ECHO MV E DECELERATION TIME (DT): 278.4 MS
ECHO MV E VELOCITY: 0.8 M/S
ECHO MV E/A RATIO: 0.9
ECHO MV E/E' LATERAL: 8
ECHO MV E/E' RATIO (AVERAGED): 7.64
ECHO MV E/E' SEPTAL: 7.27
ECHO MV LVOT VTI INDEX: 1.16
ECHO MV MAX VELOCITY: 1.1 M/S
ECHO MV MEAN GRADIENT: 2 MMHG
ECHO MV MEAN VELOCITY: 0.8 M/S
ECHO MV PEAK GRADIENT: 5 MMHG
ECHO MV PRESSURE HALF TIME (PHT): 80.7 MS
ECHO MV VTI: 24.7 CM
ECHO RV FREE WALL PEAK S': 10 CM/S
ECHO RV INTERNAL DIMENSION: 4.1 CM
ECHO RV TAPSE: 1.8 CM (ref 1.7–?)

## 2022-04-20 ENCOUNTER — TELEPHONE (OUTPATIENT)
Dept: INTERNAL MEDICINE CLINIC | Age: 52
End: 2022-04-20

## 2022-04-20 NOTE — TELEPHONE ENCOUNTER
----- Message from [facility] sent at 2022  1:15 PM EDT -----  Subject: Appointment Request    Reason for Call: New Patient Request Appointment    QUESTIONS  Type of Appointment? New Patient/New to Provider  Reason for appointment request? No appointments available during search  Additional Information for Provider? Patient's appt was cancelled for    and wanted to reschedule. ECC couldn't find anything for patient. Please   call patient to schedule her.  ---------------------------------------------------------------------------  --------------  CALL BACK INFO  What is the best way for the office to contact you? OK to leave message on   voicemail  Preferred Call Back Phone Number? [phone]  ---------------------------------------------------------------------------  --------------  SCRIPT ANSWERS  Relationship to Patient? Self  Is this the first appointment to establish care for a ? No  Have you been diagnosed with, awaiting test results for, or told that you   are suspected of having COVID-19 (Coronavirus)? (If patient has tested   negative or was tested as a requirement for work, school, or travel and   not based on symptoms, answer no)? No  Within the past 10 days have you developed any of the following symptoms   (answer no if symptoms have been present longer than 10 days or began   more than 10 days ago)? Fever or Chills, Cough, Shortness of breath or   difficulty breathing, Loss of taste or smell, Sore throat, Nasal   congestion, Sneezing or runny nose, Fatigue or generalized body aches   (answer no if pain is specific to a body part e.g. back pain), Diarrhea,   Headache? No  Have you had close contact with someone with COVID-19 in the last 7 days? No  (Service Expert  click yes below to proceed with artaculous As Usual   Scheduling)?  Yes

## 2022-04-21 ENCOUNTER — OFFICE VISIT (OUTPATIENT)
Dept: CARDIOLOGY CLINIC | Age: 52
End: 2022-04-21
Payer: COMMERCIAL

## 2022-04-21 VITALS
SYSTOLIC BLOOD PRESSURE: 130 MMHG | HEIGHT: 69 IN | WEIGHT: 293 LBS | TEMPERATURE: 97.6 F | RESPIRATION RATE: 16 BRPM | HEART RATE: 95 BPM | OXYGEN SATURATION: 98 % | BODY MASS INDEX: 43.4 KG/M2 | DIASTOLIC BLOOD PRESSURE: 88 MMHG

## 2022-04-21 DIAGNOSIS — I71.20 THORACIC AORTIC ANEURYSM WITHOUT RUPTURE: Primary | ICD-10-CM

## 2022-04-21 PROCEDURE — 99205 OFFICE O/P NEW HI 60 MIN: CPT | Performed by: THORACIC SURGERY (CARDIOTHORACIC VASCULAR SURGERY)

## 2022-04-21 RX ORDER — METOPROLOL SUCCINATE 25 MG/1
25 TABLET, EXTENDED RELEASE ORAL DAILY
Qty: 30 TABLET | Refills: 2 | Status: SHIPPED | OUTPATIENT
Start: 2022-04-21 | End: 2022-08-04

## 2022-04-21 RX ORDER — FUROSEMIDE 20 MG/1
20 TABLET ORAL DAILY
Qty: 30 TABLET | Refills: 2 | Status: SHIPPED | OUTPATIENT
Start: 2022-04-21

## 2022-04-21 NOTE — PROGRESS NOTES
I agree with this consultation note. I personally interviewed and examined the patient, and reviewed their diagnostic studies in detail. I will also discussed the case in detail with the referring provider. I reviewed her echocardiograms and CT scans in detail. With regards to her aortic insufficiency, it appears that she has had moderate AI that is grossly unchanged since at least 2014. It appears that she is tolerating this rather well clinically. There is no evidence of left ventricular dilation. She reports occasional rare palpitations. She reports that she has shortness of breath consistent with NYHA class III symptoms but that she has stopped taking a diuretic that was previously prescribed to her due to lack of insurance. Overall, her symptoms appear relatively mild and potentially medically treatable at this time. With regards to her aorta,  I measure her ascending aorta at approximately 44 mm in diameter in 2013. Now in 2022, I measured at the same location at approximately 49 mm. This is growing at an acceptably slow rate. Although her aorta is clearly abnormal and aneurysmal in its ascending portion, it is neither growing excessively rapidly or excessively large so as to necessitate urgent intervention at this time. I believe that the best course of action at the moment is to attain better control of her hypertension and fluid status with medical therapy. In the event that these interventions alleviate or significantly improve her symptoms, it would be reasonable to continue regular periodic observation of her aneurysm. In the event that medical therapy does not improve her symptoms, or that her symptoms significantly worsen, or her aortic insufficiency increases in severity, or her aortic aneurysm continues to enlarge at a significant rate, it would then be quite reasonable to consider an operation that would address both her aortic aneurysm and aortic insufficiency.     In that regard, it is unclear whether or not her aortic insufficiency is due to to the enlargement of her ascending aorta. The sinotubular junction appears grossly preserved and not significantly enlarged, suggesting that her aortic valve dysfunction may be unrelated to her thoracic aneurysm. In order to better determine the etiology of her aortic valve dysfunction, a transesophageal echocardiogram would be very useful. The performance of this test may be reserved for when an intervention is being planned. We will prescribe beta-blockers and diuretics at this time and schedule a follow-up visit with another CT scan and transthoracic echocardiogram in 6 months to reassess the situation. We appreciate the opportunity to be involved in the care of this patient. Elvia Weber MD, PhD, [facility]. CSS   History and Physical    Subjective:      Minna Marcelino is a 54 y.o. female who was referred for cardiac evaluation of ascending aortic aneurysm, referred by Dr. Leo Narvaez.  Pt's PMH includes chronic pain/fibramyalgia, depression, obesity, arthritis, migraines, HLD and HTN. Pt has a known thoracic aneurysm dating back to 2018. On chart review of imaging dating back to 2013, she has had mild MR and ascending aorta measured at 4.4cm. Most recent imaging shows aneurysm has increased to 4.9cm and her AR is still mild. Unclear if AV is bicuspid on imaging. Pt recently went to ER for chest pain, dizziness and SOB. She admits she use to be on lasix a few years ago but stopped it after losing her medical insurance. She noticed her SOB worsen after that. She has to stop d/t SOB with short distances. She admits to trouble sleeping and has plans for a sleep study (had previous sleep study in 2005/2006). Pt admits to wakes herself snoring. She wears LE compression for LE edema (has 1 significant varicose vein on L leg). She denies syncope, orthopnea, or PND.    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 She is accompanied by her fiance. Of note, has trouble with swallowing meat. She modifies her diet as needed. Former tobacco smoker, quit 29 years ago 1PPD. Covid pneumonia 2020, hospitalized, covid vaccine x 2 + booster. Denies drug and alcohol use. Denies family history of heart problems. Cardiac Testing    Cardiac catheterization: None    TTE 4/11/22:   Left Ventricle: Normal left ventricular systolic function. EF by 2D Simpsons Biplane is 68%. Left ventricle size is normal. Normal wall thickness. Normal wall motion.   Aortic Valve: Thickened cusp. Sclerosis of the aortic valve cusp. Cannot exclude bicuspid morphology. Mild regurgitation.   Aorta: Dilated ascending aorta (5.0 cm).       Comparison Study Information      Prior Study    There is a prior study available for comparison. Prior study date: 5/8/2014. Echo Findings    Left Ventricle Left ventricle size is normal. Normal wall thickness. Normal wall motion. Normal left ventricular systolic function. EF by 2D Simpsons Biplane is 68%. Left Atrium Left atrium size is normal.   Right Ventricle Right ventricle size is normal. Normal systolic function. Right Atrium Right atrium size is normal.   Aortic Valve Thickened cusp. Sclerosis of the aortic valve cusp. Cannot exclude bicuspid morphology. Mild regurgitation. AV mean gradient is 15 mmHg. AV peak gradient is 28 mmHg. AV peak velocity is 2.6 m/s. LVOT:AV VTI Index is 0.47. AV area by continuity VTI is 2.2 cm2. Mitral Valve Normal leaflet separation. No transvalvular regurgitation. No stenosis noted. Tricuspid Valve Normal leaflet separation. Trace transvalvular regurgitation. No stenosis noted. Pulmonic Valve The pulmonic valve was not well visualized. Physiologically normal transvalvular regurgitation. No stenosis noted. Aorta Dilated ascending aorta (5.0 cm).    IVC/Hepatic Veins IVC diameter is less than or equal to 21 mm and decreases greater than 50% during inspiration; therefore the estimated right atrial pressure is normal (~3 mmHg). Pericardium No pericardial effusion. CTA chest 3/29/22: LUNG PARENCHYMA/PLEURA: Normal  TRACHEA/BRONCHI: Normal  PULMONARY VESSELS: Normal. No evidence of pulmonary artery filling defects. MEDIASTINUM: Single anterior mediastinal lymph node measuring 16 mm in short  axis, unchanged from the prior study. HEART: Normal  AORTA/GREAT VESSELS: Ascending aorta remains dilated up to 5 cm in diameter. This is unchanged from the 2020 exam. At the level of the brachiocephalic  vascular origins the diameter is normal. Descending aorta is normal in diameter. There is no evidence of dissection. ESOPHAGUS: Normal  AXILLAE: Normal  BONES/TISSUES: No acute abnormality     UPPER ABDOMEN: No acute process  OTHER: None     IMPRESSION  No evidence of pulmonary embolism or acute aortic abnormalities. Stable aneurysmal dilatation of the ascending aorta.  Maximum diameter is 5 cm.       Past Medical History:   Diagnosis Date    Aortic aneurysm (thoracic)     Arthritis     Chronic knee pain 3/24/2010    Chronic low back pain 3/24/2010    Chronic pain     Depression 3/24/2010    Fibromyalgia 2014    Genital herpes 3/24/2010    GERD (gastroesophageal reflux disease) 3/24/2010    H/O endoscopic retrograde cholangiopancreatography [id number]    Hyperlipidemia 3/24/2010    Hypothyroidism 3/24/2010    Migraines 3/24/2010    Obesity 3/24/2010    Persistent disorder of initiating or maintaining sleep 3/24/2010    Plantar fasciitis 3/24/2010     Past Surgical History:   Procedure Laterality Date    HX  SECTION     [facility] CHOLECYSTECTOMY  [id number]    HX CHOLECYSTECTOMY  2013    emergency cholectomy   [facility] GYN  [id number]    hysterectomy,endometrial ablation    HX HYSTERECTOMY        Social History     Tobacco Use    Smoking status: Former Smoker    Smokeless tobacco: Never Used    Tobacco comment: quit 9 yrs ago   Substance Use Topics    Alcohol use: Not Currently     Comment: Socially      No family history on file. Prior to Admission medications    Medication Sig Start Date End Date Taking? Authorizing Provider   topiramate (Topamax) 50 mg tablet Take 50 mg by mouth daily. Yes Usha Gallardo MD   levothyroxine (SYNTHROID) 88 mcg tablet TAKE ONE TABLET BY MOUTH DAILY BEFORE BREAKFAST 10/21/17  Yes Heide De Jesus MD   cyclobenzaprine (FLEXERIL) 10 mg tablet Take 1 Tab by mouth three (3) times daily as needed for Muscle Spasm(s). 9/20/17  Yes Frankie Marie MD   Cholecalciferol, Vitamin D3, (VITAMIN D3) 2,000 unit cap capsule Take one tablet daily 7/26/17  Yes Frankie Marie MD   rizatriptan (MAXALT) 10 mg tablet TAKE ONE TABLET BY MOUTH AS NEEDED  MAY REPEAT IN 2 HOURS 7/25/17  Yes Heide De Jesus MD   DULoxetine (CYMBALTA) 60 mg capsule Take 1 Cap by mouth daily. 3/24/17  Yes Frankie Marie MD   acyclovir (ZOVIRAX) 400 mg tablet Take 1 Tab by mouth five (5) times daily. Indications: as needed 1/18/17  Yes Heide De Jesus MD   fluticasone [facility]) 50 mcg/actuation nasal spray 2 Sprays by Both Nostrils route daily. 10/11/16  Yes Heide De Jesus MD   esomeprazole (NEXIUM) 20 mg capsule Take 1 Cap by mouth daily. 2/8/16  Yes Heide De Jesus MD       Allergies   Allergen Reactions    Augmentin [Amoxicillin-Pot Clavulanate] Nausea and Vomiting    Avelox [Moxifloxacin] Other (comments)     \"Gave her really bad thrush\"    Bactrim [Sulfamethoprim] Rash    Codeine Other (comments)    Percocet [Oxycodone-Acetaminophen] Other (comments)     \"Severe Migraines\"       Review of Systems:   Consititutional: Denies fever or chills. Eyes:  Denies use of glasses or vision problems(cataracts). ENT:  Denies hearing or swallowing difficulty. CV: Denies CP, claudication, HTN. Resp: Denies dyspnea, productive cough. : Denies dialysis or kidney problems.   GI: Denies ulcers, esophageal strictures, liver problems. M/S: Denies joint or bone problems, or implanted artificial hardware. Skin: Denies varicose veins, edema. Neuro: Denies strokes, or TIAs. Psych: Denies anxiety or depression. Endocrine: Denies thyroid problems or diabetes. Heme/Lymphatic: Denies easy bruising or lymphedema. Objective:     VS:   Visit Vitals  /88 (BP 1 Location: Left upper arm, BP Patient Position: Sitting)   Pulse 95   Temp 97.6 °F (36.4 °C) (Oral)   Resp 16   Ht 5' 9\" (1.753 m)   Wt 305 lb (138.3 kg)   SpO2 98%   BMI 45.04 kg/m²         Physical Exam:    General appearance: alert, cooperative, no distress  Head: normocephalic, without obvious abnormality; atraumatic  Eyes: conjunctivae/corneas clear; EOM's intact. Nose: nares normal; no drainage. Neck: no carotid bruit and no JVD  Lungs: clear to auscultation bilaterally  Heart: regular rate and rhythm; + diastolic murmur  Abdomen: soft, non-tender; bowel sounds normal  Extremities: moves all extremities; no weakness. Skin: Skin color normal; No varicose veins or edema. Neurologic: Grossly normal      Treatment Plan:    1. Ascending aortic anuerysm w/ mild AR:  Repeat Chest CTA and TTE in 6 months. Work on better BP control. Discuss lifestyle modifications and weight loss. Unclear if bicuspid. 2. Palpitations: Will try low dose BB. 3. Chronic pain/fibramyalagia. 4. HFpEF (NYHA III): LV EF 65%  5. GERD: On nexium  6. HTN:  Start low dose BB and lasix 20mg PO. Will follow up labs in 1 week. 7.CKD2: Creat 1-1.2. Starting diuretics, follow up labs in 1 week.      Signed By: Jerry Ding NP     April 21, 2022

## 2022-04-29 LAB
BUN SERPL-MCNC: 18 MG/DL (ref 6–24)
BUN/CREAT SERPL: 17 (ref 9–23)
CALCIUM SERPL-MCNC: 9.8 MG/DL (ref 8.7–10.2)
CHLORIDE SERPL-SCNC: 100 MMOL/L (ref 96–106)
CO2 SERPL-SCNC: 25 MMOL/L (ref 20–29)
CREAT SERPL-MCNC: 1.09 MG/DL (ref 0.57–1)
EGFR: 62 ML/MIN/1.73
GLUCOSE SERPL-MCNC: 93 MG/DL (ref 65–99)
POTASSIUM SERPL-SCNC: 4.6 MMOL/L (ref 3.5–5.2)
SODIUM SERPL-SCNC: 141 MMOL/L (ref 134–144)

## 2022-05-23 ENCOUNTER — PATIENT MESSAGE (OUTPATIENT)
Dept: CARDIOLOGY CLINIC | Age: 52
End: 2022-05-23

## 2022-06-10 ENCOUNTER — PATIENT MESSAGE (OUTPATIENT)
Dept: CARDIOLOGY CLINIC | Age: 52
End: 2022-06-10

## 2022-06-10 NOTE — TELEPHONE ENCOUNTER
From: Hyacinth Miles  To: [facility] [address]: 6/10/2022 2:31 PM EDT  Subject: Appointment    I need to cancel my appointment on the 16th please

## 2022-06-23 NOTE — TELEPHONE ENCOUNTER
PATIENT RETURNED TO ROOM 17 OPS. PATIENT ALONE. CALLING MOTHER TO COME PICK HER UP   Per Yolanda Torres refill request encounter retracted.  Patient is in the process of being discharged

## 2022-07-03 ENCOUNTER — PATIENT MESSAGE (OUTPATIENT)
Dept: CARDIOLOGY CLINIC | Age: 52
End: 2022-07-03

## 2022-07-05 ENCOUNTER — TELEPHONE (OUTPATIENT)
Dept: CARDIOLOGY CLINIC | Age: 52
End: 2022-07-05

## 2022-07-05 NOTE — TELEPHONE ENCOUNTER
10:30AM: Attempted to call Ms. Velazquez, unable to leave a message regarding her recent call with complaints of GI issues and questions regarding her metoprolol. Per chart review, the metoprolol was prescribed in April and assuming she has been taking since, it would be unusual that she is just now experiencing side effects. Given her ascending aortic aneurysm it is  Critical she has has good blood pressure and heart rate control. I would recommend she continue taking her metoprolol. I will attempt to call again at a later time.      Signed By: Naun Paulino NP     July 5, 2022

## 2022-08-04 ENCOUNTER — TELEPHONE (OUTPATIENT)
Dept: CARDIOLOGY CLINIC | Age: 52
End: 2022-08-04

## 2022-08-04 DIAGNOSIS — I71.20 THORACIC AORTIC ANEURYSM WITHOUT RUPTURE: Primary | ICD-10-CM

## 2022-08-04 RX ORDER — METOPROLOL SUCCINATE 50 MG/1
50 TABLET, EXTENDED RELEASE ORAL DAILY
Qty: 30 TABLET | Refills: 2 | Status: SHIPPED
Start: 2022-08-04 | End: 2022-10-18

## 2022-08-04 NOTE — TELEPHONE ENCOUNTER
I left a message on the pateint's answering machine and recommended that she get refills on metoprolol from her PCP or cardiologist.

## 2022-08-25 ENCOUNTER — HOSPITAL ENCOUNTER (EMERGENCY)
Age: 52
Discharge: HOME | End: 2022-08-25
Attending: FAMILY MEDICINE
Payer: COMMERCIAL

## 2022-08-25 VITALS
DIASTOLIC BLOOD PRESSURE: 76 MMHG | HEIGHT: 69 IN | OXYGEN SATURATION: 96 % | BODY MASS INDEX: 43.4 KG/M2 | RESPIRATION RATE: 16 BRPM | TEMPERATURE: 97.7 F | WEIGHT: 293 LBS | SYSTOLIC BLOOD PRESSURE: 126 MMHG | HEART RATE: 103 BPM

## 2022-08-25 DIAGNOSIS — N30.00 ACUTE CYSTITIS WITHOUT HEMATURIA: ICD-10-CM

## 2022-08-25 DIAGNOSIS — N30.90 CYSTITIS: Primary | ICD-10-CM

## 2022-08-25 LAB
APPEARANCE UR: CLEAR
BACTERIA URNS QL MICRO: ABNORMAL /HPF
BILIRUB UR QL: ABNORMAL
COLOR UR: YELLOW
EPITH CASTS URNS QL MICRO: ABNORMAL /LPF
GLUCOSE UR STRIP.AUTO-MCNC: NEGATIVE MG/DL
HGB UR QL STRIP: NEGATIVE
KETONES UR QL STRIP.AUTO: NEGATIVE MG/DL
LEUKOCYTE ESTERASE UR QL STRIP.AUTO: ABNORMAL
NITRITE UR QL STRIP.AUTO: NEGATIVE
PH UR STRIP: 7 [PH] (ref 5–8)
PROT UR STRIP-MCNC: NEGATIVE MG/DL
RBC #/AREA URNS HPF: ABNORMAL /HPF (ref 0–5)
SP GR UR REFRACTOMETRY: 1.01 (ref 1–1.03)
UROBILINOGEN UR QL STRIP.AUTO: 1 EU/DL (ref 0.2–1)
WBC URNS QL MICRO: ABNORMAL /HPF (ref 0–4)

## 2022-08-25 PROCEDURE — 99283 EMERGENCY DEPT VISIT LOW MDM: CPT

## 2022-08-25 PROCEDURE — 81001 URINALYSIS AUTO W/SCOPE: CPT

## 2022-08-25 RX ORDER — CEPHALEXIN 500 MG/1
500 CAPSULE ORAL 4 TIMES DAILY
Qty: 28 CAPSULE | Refills: 0 | Status: SHIPPED | OUTPATIENT
Start: 2022-08-25 | End: 2022-09-01

## 2022-08-25 RX ORDER — CEPHALEXIN 500 MG/1
500 CAPSULE ORAL 4 TIMES DAILY
Qty: 28 CAPSULE | Refills: 0 | Status: SHIPPED | OUTPATIENT
Start: 2022-08-25 | End: 2022-08-25

## 2022-08-25 NOTE — ED TRIAGE NOTES
Started yesterdaylower left abd pain and  lower abd over pubis bone, doesn't burn, does have frequency, odor to urine, Pt is treating a yeast infection last night, pain to lower back hx of ddd, and CKD II, and IBS. Tender to area around pubis bone.

## 2022-08-25 NOTE — DISCHARGE INSTRUCTIONS
Thank you! Thank you for allowing me to care for you in the emergency department. It is my goal to provide you with excellent care. If you have not received excellent quality care, please ask to speak to the nurse manager. Please fill out the survey that will come to you by mail or email since we listen to your feedback! Below you will find a list of your tests from today's visit. Should you have any questions, please do not hesitate to call the emergency department. Labs  Recent Results (from the past 12 hour(s))   URINALYSIS W/ RFLX MICROSCOPIC    Collection Time: 08/25/22  5:50 PM   Result Value Ref Range    Color Yellow      Appearance Clear Clear      Specific gravity 1.010 1.003 - 1.030      pH (UA) 7.0 5.0 - 8.0      Protein Negative Negative mg/dL    Glucose Negative Negative mg/dL    Ketone Negative Negative mg/dL    Bilirubin Small (A) Negative      Blood Negative Negative      Urobilinogen 1.0 0.2 - 1.0 EU/dL    Nitrites Negative Negative      Leukocyte Esterase Trace (A) Negative     URINE MICROSCOPIC    Collection Time: 08/25/22  5:50 PM   Result Value Ref Range    WBC 5-10 0 - 4 /hpf    RBC 20-50 0 - 5 /hpf    Epithelial cells Moderate (A) Few /lpf    Bacteria 3+ (A) Negative /hpf       Radiologic Studies  No orders to display     CT Results  (Last 48 hours)      None          CXR Results  (Last 48 hours)      None          ------------------------------------------------------------------------------------------------------------  The exam and treatment you received in the Emergency Department were for an urgent problem and are not intended as complete care. It is important that you follow-up with a doctor, nurse practitioner, or physician assistant to:  (1) confirm your diagnosis,  (2) re-evaluation of changes in your illness and treatment, and  (3) for ongoing care. Please take your discharge instructions with you when you go to your follow-up appointment.      If you have any problem arranging a follow-up appointment, contact the Emergency Department. If your symptoms become worse or you do not improve as expected and you are unable to reach your health care provider, please return to the Emergency Department. We are available 24 hours a day. If a prescription has been provided, please have it filled as soon as possible to prevent a delay in treatment. If you have any questions or reservations about taking the medication due to side effects or interactions with other medications, please call your primary care provider or contact the ER.

## 2022-08-25 NOTE — ED PROVIDER NOTES
EMERGENCY DEPARTMENT HISTORY AND PHYSICAL EXAM      Date: 8/25/2022  Patient Name: Lenetta Buerger    History of Presenting Illness   No chief complaint on file. History Provided By: Patient    HPI: Lenetta Buerger, 54 y.o. female with a history of thoracic aneurysm, obesity, fibromyalgia hyperlipidemia, hypothyroidism, migraine, and arthritis presents with 1 day history of suprapubic pain, urgency, and frequency of urination. She also reports a foul odor to her urine. She denies fever, rash, nausea, vomiting, abdominal pain, back pain. She has not taken anything to treat her symptoms. There are no other complaints, changes, or physical findings at this time. PCP: Kim Hunt MD    No current facility-administered medications on file prior to encounter. Current Outpatient Medications on File Prior to Encounter   Medication Sig Dispense Refill    metoprolol succinate (TOPROL-XL) 50 mg XL tablet Take 1 Tablet by mouth in the morning. Indications: high blood pressure 30 Tablet 2    furosemide (LASIX) 20 mg tablet Take 1 Tablet by mouth daily. 30 Tablet 2    topiramate (TOPAMAX) 50 mg tablet Take 50 mg by mouth daily. levothyroxine (SYNTHROID) 88 mcg tablet TAKE ONE TABLET BY MOUTH DAILY BEFORE BREAKFAST 30 Tab 0    Cholecalciferol, Vitamin D3, (VITAMIN D3) 2,000 unit cap capsule Take one tablet daily 30 Cap 5    rizatriptan (MAXALT) 10 mg tablet TAKE ONE TABLET BY MOUTH AS NEEDED  MAY REPEAT IN 2 HOURS 12 Tab 32    DULoxetine (CYMBALTA) 60 mg capsule Take 1 Cap by mouth daily. 30 Cap 3    esomeprazole (NEXIUM) 20 mg capsule Take 1 Cap by mouth daily. 30 Cap 5    cyclobenzaprine (FLEXERIL) 10 mg tablet Take 1 Tab by mouth three (3) times daily as needed for Muscle Spasm(s). (Patient not taking: Reported on 8/25/2022) 30 Tab 5    acyclovir (ZOVIRAX) 400 mg tablet Take 1 Tab by mouth five (5) times daily.  Indications: as needed (Patient not taking: Reported on 8/25/2022) 30 Tab 0 fluticasone (FLONASE) 50 mcg/actuation nasal spray 2 Sprays by Both Nostrils route daily. (Patient not taking: Reported on 2022) 1 Bottle 5       Past History     Past Medical History:  Past Medical History:   Diagnosis Date    Aortic aneurysm (thoracic)     Arthritis     Chronic knee pain 3/24/2010    Chronic low back pain 3/24/2010    Chronic pain     Depression 3/24/2010    Fibromyalgia 2014    Genital herpes 3/24/2010    GERD (gastroesophageal reflux disease) 3/24/2010    H/O endoscopic retrograde cholangiopancreatography [id number]    Hyperlipidemia 3/24/2010    Hypothyroidism 3/24/2010    Migraines 3/24/2010    Obesity 3/24/2010    Persistent disorder of initiating or maintaining sleep 3/24/2010    Plantar fasciitis 3/24/2010       Past Surgical History:  Past Surgical History:   Procedure Laterality Date    HX  SECTION      HX CHOLECYSTECTOMY  [id number]    HX CHOLECYSTECTOMY  2013    emergency cholectomy    HX GYN  3239,4000    hysterectomy,endometrial ablation    HX HYSTERECTOMY         Family History:  History reviewed. No pertinent family history. Social History:  Social History     Tobacco Use    Smoking status: Former    Smokeless tobacco: Never    Tobacco comments:     quit 9 yrs ago   Substance Use Topics    Alcohol use: Not Currently     Comment: Socially    Drug use: No     Types: OTC, Prescription       Allergies: Allergies   Allergen Reactions    Augmentin [Amoxicillin-Pot Clavulanate] Nausea and Vomiting    Avelox [Moxifloxacin] Other (comments)     \"Gave her really bad thrush\"    Bactrim [Sulfamethoprim] Rash    Codeine Other (comments)    Percocet [Oxycodone-Acetaminophen] Other (comments)     \"Severe Migraines\"       Review of Systems   Review of Systems   Constitutional: Negative. Negative for appetite change, chills, fatigue and fever. HENT: Negative. Negative for congestion, rhinorrhea and sore throat. Eyes: Negative. Respiratory: Negative.      Cardiovascular: Negative. Negative for chest pain. Gastrointestinal:  Positive for diarrhea. Negative for abdominal pain, constipation and vomiting. Genitourinary:  Positive for dysuria, frequency, pelvic pain and urgency. Negative for decreased urine volume, difficulty urinating, flank pain, genital sores, hematuria, vaginal bleeding and vaginal discharge. Musculoskeletal: Negative. Negative for back pain and myalgias. Skin: Negative. Negative for rash. Allergic/Immunologic: Negative. Neurological: Negative. Negative for numbness and headaches. Hematological: Negative. Psychiatric/Behavioral: Negative. Physical Exam   Physical Exam  Vitals and nursing note reviewed. Constitutional:       General: She is not in acute distress. Appearance: She is not ill-appearing. HENT:      Head: Normocephalic. Eyes:      Extraocular Movements: Extraocular movements intact. Cardiovascular:      Rate and Rhythm: Normal rate and regular rhythm. Pulses: Normal pulses. Heart sounds: Murmur heard. Pulmonary:      Effort: Pulmonary effort is normal. No respiratory distress. Breath sounds: Normal breath sounds. No wheezing. Abdominal:      General: There is no distension. Palpations: Abdomen is soft. Tenderness: There is no abdominal tenderness. There is no right CVA tenderness, left CVA tenderness, guarding or rebound. Hernia: No hernia is present. Musculoskeletal:         General: Normal range of motion. Cervical back: Normal range of motion. Skin:     General: Skin is warm and dry. Coloration: Skin is not jaundiced. Findings: No rash. Neurological:      General: No focal deficit present. Mental Status: She is alert.    Psychiatric:         Mood and Affect: Mood normal.       Lab and Diagnostic Study Results   Labs -      Latest Reference Range & Units 8/25/22 17:50   Color -   Yellow   Appearance Clear   Clear   Specific gravity 1.003 - 1.030   1.010 pH (UA) 5.0 - 8.0   7.0   Protein Negative mg/dL Negative   Glucose Negative mg/dL Negative   Ketone Negative mg/dL Negative   Blood Negative   Negative   Bilirubin Negative   Small ! Urobilinogen 0.2 - 1.0 EU/dL 1.0   Nitrites Negative   Negative   Leukocyte Esterase Negative   Trace ! Epithelial cells Few /lpf Moderate ! WBC 0 - 4 /hpf 5-10   RBC 0 - 5 /hpf 20-50   Bacteria Negative /hpf 3+ !   !: Data is abnormal        Radiologic Studies -   @lastxrresult@  CT Results  (Last 48 hours)      None          CXR Results  (Last 48 hours)      None            Medical Decision Making and ED Course   Differential Diagnosis & Medical Decision Making Provider Note:     - I am the first provider for this patient. I reviewed the vital signs, available nursing notes, past medical history, past surgical history, family history and social history. The patients presenting problems have been discussed, and they are in agreement with the care plan formulated and outlined with them. I have encouraged them to ask questions as they arise throughout their visit. Vital Signs-Reviewed the patient's vital signs. ED Course:   ED Course as of 08/26/22 1029   Thu Aug 25, 2022   1812 Nursing triage note, history, and vital signs reviewed. [KW]   1813 Urine with bacteria, leukocytes. [KW]      ED Course User Index  [KW] Gerardo Gibson, SAULO   44-year-old female with suprapubic pain, dysuria, frequency, and urgency. She is well-appearing with stable vital signs. Ddx: UTI, pyelonephritis, STI, kidney stone, adnexal infection or ischemia. She is afebrile without hematuria, vaginal discharge, vaginal bleeding, flank pain, abdominal pain, back pain, vaginal pain, chills, vomiting, Jalen's sign, or Jones sign. Will collect urine and reassess. Discussed labs to evaluate kidney function, electrolytes, white and red cell counts.   Patient feels her symptoms are consistent with UTI and lab evaluation is not necessary at this time  Antibiotics prescribed for suspected UTI. Return precautions, including but not limited to, severe abdominal pain, back pain, fever, reduced urine output, hematuria, altered mentation, chest pain, or difficulty breathing. Patient is aware that her symptoms may be an early sign of a more emergent condition. She agrees to follow-up with her primary care in the next 12 to 24 hours or return to the ED for worsening symptoms. Procedures   Performed by: Roddy Garcia NP  Procedures      Disposition   Disposition: DC- Adult Discharges: All of the diagnostic tests were reviewed and questions answered. Diagnosis, care plan and treatment options were discussed. The patient understands the instructions and will follow up as directed. The patients results have been reviewed with them. They have been counseled regarding their diagnosis. The patient verbally convey understanding and agreement of the signs, symptoms, diagnosis, treatment and prognosis and additionally agrees to follow up as recommended with their PCP in 24 - 48 hours. They also agree with the care-plan and convey that all of their questions have been answered. I have also put together some discharge instructions for them that include: 1) educational information regarding their diagnosis, 2) how to care for their diagnosis at home, as well a 3) list of reasons why they would want to return to the ED prior to their follow-up appointment, should their condition change. DISCHARGE PLAN:  1. Current Discharge Medication List        CONTINUE these medications which have NOT CHANGED    Details   metoprolol succinate (TOPROL-XL) 50 mg XL tablet Take 1 Tablet by mouth in the morning. Indications: high blood pressure  Qty: 30 Tablet, Refills: 2    Associated Diagnoses: Thoracic aortic aneurysm without rupture (HCC)      furosemide (LASIX) 20 mg tablet Take 1 Tablet by mouth daily.   Qty: 30 Tablet, Refills: 2      topiramate (TOPAMAX) 50 mg tablet Take 50 mg by mouth daily. levothyroxine (SYNTHROID) 88 mcg tablet TAKE ONE TABLET BY MOUTH DAILY BEFORE BREAKFAST  Qty: 30 Tab, Refills: 0      Cholecalciferol, Vitamin D3, (VITAMIN D3) 2,000 unit cap capsule Take one tablet daily  Qty: 30 Cap, Refills: 5    Associated Diagnoses: Vitamin D deficiency      rizatriptan (MAXALT) 10 mg tablet TAKE ONE TABLET BY MOUTH AS NEEDED  MAY REPEAT IN 2 HOURS  Qty: 12 Tab, Refills: 32      DULoxetine (CYMBALTA) 60 mg capsule Take 1 Cap by mouth daily. Qty: 30 Cap, Refills: 3    Associated Diagnoses: Moderate single current episode of major depressive disorder ([address] Fibromyalgia      esomeprazole (NEXIUM) 20 mg capsule Take 1 Cap by mouth daily. Qty: 30 Cap, Refills: 5      cyclobenzaprine (FLEXERIL) 10 mg tablet Take 1 Tab by mouth three (3) times daily as needed for Muscle Spasm(s). Qty: 30 Tab, Refills: 5      acyclovir (ZOVIRAX) 400 mg tablet Take 1 Tab by mouth five (5) times daily. Indications: as needed  Qty: 30 Tab, Refills: 0      fluticasone (FLONASE) 50 mcg/actuation nasal spray 2 Sprays by Both Nostrils route daily. Qty: 1 Bottle, Refills: 5    Associated Diagnoses: Chronic rhinitis           2. Follow-up Information       Follow up With Specialties Details Why Contact Info    Teri Moreland MD Internal Medicine Physician Schedule an appointment as soon as possible for a visit   [address] [place] [address],[address], [facility]  [phone]            3. Return to ED if worse   4. Discharge Medication List as of 8/25/2022  6:51 PM        START taking these medications    Details   cephALEXin (Keflex) 500 mg capsule Take 1 Capsule by mouth four (4) times daily for 7 days. , Normal, Disp-28 Capsule, R-0           CONTINUE these medications which have NOT CHANGED    Details   metoprolol succinate (TOPROL-XL) 50 mg XL tablet Take 1 Tablet by mouth in the morning.  Indications: high blood pressure, Normal, Disp-30 Tablet, R-2      furosemide (LASIX) 20 mg tablet Take 1 Tablet by mouth daily. , Normal, Disp-30 Tablet, R-2      topiramate (TOPAMAX) 50 mg tablet Take 50 mg by mouth daily. , Historical Med      levothyroxine (SYNTHROID) 88 mcg tablet TAKE ONE TABLET BY MOUTH DAILY BEFORE BREAKFAST, Normal, Disp-30 Tab, R-0      Cholecalciferol, Vitamin D3, (VITAMIN D3) 2,000 unit cap capsule Take one tablet daily, Normal, Disp-30 Cap, R-5      rizatriptan (MAXALT) 10 mg tablet TAKE ONE TABLET BY MOUTH AS NEEDED  MAY REPEAT IN 2 HOURS, Normal, Disp-12 Tab, R-32      DULoxetine (CYMBALTA) 60 mg capsule Take 1 Cap by mouth daily. , No Print, Disp-30 Cap, R-3      esomeprazole (NEXIUM) 20 mg capsule Take 1 Cap by mouth daily. , Normal, Disp-30 Cap, R-5      cyclobenzaprine (FLEXERIL) 10 mg tablet Take 1 Tab by mouth three (3) times daily as needed for Muscle Spasm(s). , Normal, Disp-30 Tab, R-5      acyclovir (ZOVIRAX) 400 mg tablet Take 1 Tab by mouth five (5) times daily. Indications: as needed, Normal, Disp-30 Tab, R-0      fluticasone (FLONASE) 50 mcg/actuation nasal spray 2 Sprays by Both Nostrils route daily. , Normal, Disp-1 Bottle, R-5             Diagnosis/Clinical Impression     Clinical Impression:   1. Cystitis    2. Acute cystitis without hematuria        Attestations: Radha Carmona NP, am the primary clinician of record. Please note that this dictation was completed with [employer], the [employer] voice recognition software. Quite often unanticipated grammatical, syntax, homophones, and other interpretive errors are inadvertently transcribed by the computer software. Please disregard these errors. Please excuse any errors that have escaped final proofreading. Thank you.

## 2022-09-03 ENCOUNTER — HOSPITAL ENCOUNTER (EMERGENCY)
Age: 52
Discharge: HOME | End: 2022-09-03
Attending: EMERGENCY MEDICINE
Payer: COMMERCIAL

## 2022-09-03 VITALS
HEART RATE: 71 BPM | SYSTOLIC BLOOD PRESSURE: 151 MMHG | OXYGEN SATURATION: 96 % | WEIGHT: 293 LBS | RESPIRATION RATE: 16 BRPM | BODY MASS INDEX: 43.4 KG/M2 | TEMPERATURE: 97.8 F | HEIGHT: 69 IN | DIASTOLIC BLOOD PRESSURE: 106 MMHG

## 2022-09-03 DIAGNOSIS — M54.41 ACUTE RIGHT-SIDED LOW BACK PAIN WITH RIGHT-SIDED SCIATICA: Primary | ICD-10-CM

## 2022-09-03 PROCEDURE — 74011250636 HC RX REV CODE- 250/636: Performed by: EMERGENCY MEDICINE

## 2022-09-03 PROCEDURE — 74011250637 HC RX REV CODE- 250/637: Performed by: EMERGENCY MEDICINE

## 2022-09-03 PROCEDURE — 96372 THER/PROPH/DIAG INJ SC/IM: CPT

## 2022-09-03 PROCEDURE — 99284 EMERGENCY DEPT VISIT MOD MDM: CPT

## 2022-09-03 RX ORDER — KETOROLAC TROMETHAMINE 30 MG/ML
30 INJECTION, SOLUTION INTRAMUSCULAR; INTRAVENOUS
Status: COMPLETED | OUTPATIENT
Start: 2022-09-03 | End: 2022-09-03

## 2022-09-03 RX ORDER — METHYLPREDNISOLONE 4 MG/1
TABLET ORAL
Qty: 1 DOSE PACK | Refills: 0 | Status: SHIPPED | OUTPATIENT
Start: 2022-09-03

## 2022-09-03 RX ORDER — METHOCARBAMOL 750 MG/1
750 TABLET, FILM COATED ORAL 3 TIMES DAILY
Qty: 20 TABLET | Refills: 0 | Status: SHIPPED | OUTPATIENT
Start: 2022-09-03

## 2022-09-03 RX ORDER — METHOCARBAMOL 500 MG/1
750 TABLET, FILM COATED ORAL
Status: COMPLETED | OUTPATIENT
Start: 2022-09-03 | End: 2022-09-03

## 2022-09-03 RX ORDER — NAPROXEN 500 MG/1
500 TABLET ORAL
Qty: 20 TABLET | Refills: 0 | Status: SHIPPED | OUTPATIENT
Start: 2022-09-03

## 2022-09-03 RX ADMIN — KETOROLAC TROMETHAMINE 30 MG: 30 INJECTION, SOLUTION INTRAMUSCULAR; INTRAVENOUS at 12:13

## 2022-09-03 RX ADMIN — METHOCARBAMOL TABLETS 750 MG: 500 TABLET, COATED ORAL at 12:10

## 2022-09-03 NOTE — ED PROVIDER NOTES
EMERGENCY DEPARTMENT HISTORY AND PHYSICAL EXAM      Date: 9/3/2022  Patient Name: Randal Sorto  Patient Age and Sex: 54 y.o. female     History of Presenting Illness     Chief Complaint   Patient presents with    Back Pain       History Provided By: Patient    HPI: Randal Sorto is a 53-year-old female with a history of degenerative disc disease, spinal stenosis, morbid obesity, hypertension, presented for back pain. Patient states that this morning she was lifting a fan when she started having sudden pain in her low back and right part of her back with numbness and tingling in her foot. Patient denies any history of sciatica however did have an MRI in 2013. Denies any trauma to her back. Denies any incontinence or fevers. There are no other complaints, changes, or physical findings at this time. PCP: Raphael Ferraro MD    No current facility-administered medications on file prior to encounter. Current Outpatient Medications on File Prior to Encounter   Medication Sig Dispense Refill    metoprolol succinate (TOPROL-XL) 50 mg XL tablet Take 1 Tablet by mouth in the morning. Indications: high blood pressure 30 Tablet 2    furosemide (LASIX) 20 mg tablet Take 1 Tablet by mouth daily. 30 Tablet 2    topiramate (TOPAMAX) 50 mg tablet Take 50 mg by mouth daily. levothyroxine (SYNTHROID) 88 mcg tablet TAKE ONE TABLET BY MOUTH DAILY BEFORE BREAKFAST 30 Tab 0    cyclobenzaprine (FLEXERIL) 10 mg tablet Take 1 Tab by mouth three (3) times daily as needed for Muscle Spasm(s). (Patient not taking: Reported on 8/25/2022) 30 Tab 5    Cholecalciferol, Vitamin D3, (VITAMIN D3) 2,000 unit cap capsule Take one tablet daily 30 Cap 5    rizatriptan (MAXALT) 10 mg tablet TAKE ONE TABLET BY MOUTH AS NEEDED  MAY REPEAT IN 2 HOURS 12 Tab 32    DULoxetine (CYMBALTA) 60 mg capsule Take 1 Cap by mouth daily. 30 Cap 3    acyclovir (ZOVIRAX) 400 mg tablet Take 1 Tab by mouth five (5) times daily.  Indications: as needed (Patient not taking: Reported on 2022) 30 Tab 0    fluticasone (FLONASE) 50 mcg/actuation nasal spray 2 Sprays by Both Nostrils route daily. (Patient not taking: Reported on 2022) 1 Bottle 5    esomeprazole (NEXIUM) 20 mg capsule Take 1 Cap by mouth daily. 27 Cap 5       Past History     Past Medical History:  Past Medical History:   Diagnosis Date    Aortic aneurysm (thoracic)     Arthritis     Chronic knee pain 3/24/2010    Chronic low back pain 3/24/2010    Chronic pain     Depression 3/24/2010    Fibromyalgia 2014    Genital herpes 3/24/2010    GERD (gastroesophageal reflux disease) 3/24/2010    H/O endoscopic retrograde cholangiopancreatography [id number]    Hyperlipidemia 3/24/2010    Hypothyroidism 3/24/2010    Migraines 3/24/2010    Obesity 3/24/2010    Persistent disorder of initiating or maintaining sleep 3/24/2010    Plantar fasciitis 3/24/2010       Past Surgical History:  Past Surgical History:   Procedure Laterality Date    HX  SECTION      HX CHOLECYSTECTOMY  [id number]    HX CHOLECYSTECTOMY  2013    emergency cholectomy    HX GYN  [id number]    hysterectomy,endometrial ablation    HX HYSTERECTOMY         Family History:  History reviewed. No pertinent family history. Social History:  Social History     Tobacco Use    Smoking status: Former    Smokeless tobacco: Never    Tobacco comments:     quit 9 yrs ago   Substance Use Topics    Alcohol use: Not Currently     Comment: Socially    Drug use: No     Types: OTC, Prescription       Allergies: Allergies   Allergen Reactions    Augmentin [Amoxicillin-Pot Clavulanate] Nausea and Vomiting    Avelox [Moxifloxacin] Other (comments)     \"Gave her really bad thrush\"    Bactrim [Sulfamethoprim] Rash    Codeine Other (comments)    Percocet [Oxycodone-Acetaminophen] Other (comments)     \"Severe Migraines\"         Review of Systems   Review of Systems   Constitutional:  Negative for chills and fever.    Respiratory:  Negative for cough and shortness of breath. Cardiovascular:  Negative for chest pain. Gastrointestinal:  Negative for abdominal pain, constipation, diarrhea, nausea and vomiting. Genitourinary:  Negative for dysuria, frequency and hematuria. Musculoskeletal:  Positive for back pain. Neurological:  Positive for numbness. Negative for weakness. All other systems reviewed and are negative. Physical Exam   Physical Exam  Vitals and nursing note reviewed. Constitutional:       Appearance: She is well-developed. She is obese. HENT:      Head: Normocephalic and atraumatic. Nose: Nose normal.      Mouth/Throat:      Mouth: Mucous membranes are moist.   Eyes:      Extraocular Movements: Extraocular movements intact. Conjunctiva/sclera: Conjunctivae normal.   Cardiovascular:      Rate and Rhythm: Normal rate and regular rhythm. Pulmonary:      Effort: Pulmonary effort is normal. No respiratory distress. Breath sounds: Normal breath sounds. Abdominal:      General: There is no distension. Palpations: Abdomen is soft. Tenderness: There is no abdominal tenderness. Musculoskeletal:         General: Normal range of motion. Cervical back: Normal range of motion and neck supple. Comments: Positive straight leg sign. Patient tender over her low back and all over her right low back including the musculature. Skin:     General: Skin is warm and dry. Neurological:      General: No focal deficit present. Mental Status: She is alert and oriented to person, place, and time. Mental status is at baseline. Psychiatric:         Mood and Affect: Mood normal.       Diagnostic Study Results     Labs -   No results found for this or any previous visit (from the past 12 hour(s)).     Radiologic Studies -   No orders to display     CT Results  (Last 48 hours)      None          CXR Results  (Last 48 hours)      None              Medical Decision Making   I am the first provider for this patient. I reviewed the vital signs, available nursing notes, past medical history, past surgical history, family history and social history. Vital Signs-Reviewed the patient's vital signs. Patient Vitals for the past 12 hrs:   Temp Pulse Resp BP SpO2   09/03/22 1158 97.8 °F (36.6 °C) 71 16 (!) 151/106 96 %       Records Reviewed: Nursing Notes and Old Medical Records    Provider Notes (Medical Decision Making): The patient complains of low back pain radiating down leg with positive straight leg sign. These symptoms are consistent with sciatica. Less likely  pathology, aortic dissection or AAA, or cauda equina given that there are no red flags and a benign physical exam.     Reviewed patient's MRI from 2013 and does show evidence of disc bulging and significant narrowing then. Advised her seeing either pain management or spine doctor in the near future. I have recommended rest, avoiding heavy lifting until better, use of intermittent heat (avoid sleeping on a heating pad), and use of OTC NSAID's (Advil, Aleve etc) or Tylenol prn for pain and steroids PRN. Given list of sciatica Exercises. Call PCP if back pain persists or she develops leg symptoms. It has also been explained that this may take up to three months to fully resolved and that smoking may slow that process even more. ED Course:   Initial assessment performed. The patients presenting problems have been discussed, and they are in agreement with the care plan formulated and outlined with them. I have encouraged them to ask questions as they arise throughout their visit. Critical Care Time:   0    Disposition:  Discharge Note:  The patient has been re-evaluated and is ready for discharge. Reviewed available results with patient. Counseled patient on diagnosis and care plan. Patient has expressed understanding, and all questions have been answered.  Patient agrees with plan and agrees to follow up as recommended, or to return to the ED if their symptoms worsen. Discharge instructions have been provided and explained to the patient, along with reasons to return to the ED. PLAN:  Current Discharge Medication List        START taking these medications    Details   methylPREDNISolone (Medrol, Phillip,) 4 mg tablet Take as directed  Qty: 1 Dose Pack, Refills: 0  Start date: 9/3/2022      methocarbamoL (Robaxin-750) 750 mg tablet Take 1 Tablet by mouth three (3) times daily. Qty: 20 Tablet, Refills: 0  Start date: 9/3/2022      naproxen (NAPROSYN) 500 mg tablet Take 1 Tablet by mouth every twelve (12) hours as needed for Pain. Qty: 20 Tablet, Refills: 0  Start date: 9/3/2022           2. Follow-up Information       Follow up With Specialties Details Why Contact Dedra Jay MD Internal Medicine Physician  As needed [address] [place] [address]  [phone]      [facility] Orthopaedics  Schedule an appointment as soon as possible for a visit   [address] [phone]          3. Return to ED if worse     Diagnosis     Clinical Impression:   1. Acute right-sided low back pain with right-sided sciatica        Attestations:  Maldonado White M.D., am the primary clinician of record. Please note that this dictation was completed with [employer], the computer voice recognition software. Quite often unanticipated grammatical, syntax, homophones, and other interpretive errors are inadvertently transcribed by the computer software. Please disregard these errors. Please excuse any errors that have escaped final proofreading. Thank you.

## 2022-09-03 NOTE — ED TRIAGE NOTES
Pt states bend to move fan at home today and got a sudden sharp pain in right lower back radiating to right foot. Foot is tingling. No dysuria. Hx spina stenosis, arthritis, and DDD. Took 600 ibuprofen and 100mg tylenol without relief.

## 2022-09-03 NOTE — LETTER
[facility] EMERGENCY DEPARTMENT  [address] [facility] [address]  [phone]    Work/School Note    Date: 9/3/2022    To Whom It May concern:    Justin Pisano was seen and treated today in the emergency room by the following Dr. Maya Hanley. Justin Pisano is excused from work/school on 09/03/22 and 09/04/22. She is medically clear to return to work/school on 09/05/22.      Sincerely,          Anuj Carrillo RN

## 2022-09-27 ENCOUNTER — TRANSCRIBE ORDER (OUTPATIENT)
Dept: SCHEDULING | Age: 52
End: 2022-09-27

## 2022-09-27 DIAGNOSIS — G89.29 CHRONIC RIGHT-SIDED LOW BACK PAIN WITH RIGHT-SIDED SCIATICA: Primary | ICD-10-CM

## 2022-09-27 DIAGNOSIS — M54.16 LUMBAR RADICULOPATHY: ICD-10-CM

## 2022-09-27 DIAGNOSIS — M54.41 CHRONIC RIGHT-SIDED LOW BACK PAIN WITH RIGHT-SIDED SCIATICA: Primary | ICD-10-CM

## 2022-10-03 DIAGNOSIS — I71.20 THORACIC AORTIC ANEURYSM WITHOUT RUPTURE, UNSPECIFIED PART: ICD-10-CM

## 2022-10-03 DIAGNOSIS — I35.1 AORTIC VALVE INSUFFICIENCY, ETIOLOGY OF CARDIAC VALVE DISEASE UNSPECIFIED: Primary | ICD-10-CM

## 2022-10-06 ENCOUNTER — PATIENT MESSAGE (OUTPATIENT)
Dept: CARDIOLOGY CLINIC | Age: 52
End: 2022-10-06

## 2022-10-07 ENCOUNTER — PATIENT MESSAGE (OUTPATIENT)
Dept: CARDIOLOGY CLINIC | Age: 52
End: 2022-10-07

## 2022-10-07 DIAGNOSIS — I71.21 ANEURYSM OF ASCENDING AORTA WITHOUT RUPTURE: Primary | ICD-10-CM

## 2022-10-07 DIAGNOSIS — I10 PRIMARY HYPERTENSION: ICD-10-CM

## 2022-10-07 NOTE — TELEPHONE ENCOUNTER
----- Message from Dianne Velazquez sent at 10/6/2022  5:29 PM EDT -----  Regarding: Metoprolol   I have noticed a significant weight gain since being on metoprolol. Anyway you would consider changing to another beta blocker such as Carvedilol?

## 2022-10-13 ENCOUNTER — TELEPHONE (OUTPATIENT)
Dept: SURGERY | Age: 52
End: 2022-10-13

## 2022-10-13 NOTE — TELEPHONE ENCOUNTER
I tried to call patient regarding bariatric benefits. She is covered. I left a message for her to return my call.

## 2022-10-15 DIAGNOSIS — G47.00 INSOMNIA, UNSPECIFIED TYPE: Primary | ICD-10-CM

## 2022-10-18 RX ORDER — CARVEDILOL 6.25 MG/1
6.25 TABLET ORAL 2 TIMES DAILY WITH MEALS
Qty: 60 TABLET | Refills: 3 | Status: SHIPPED | OUTPATIENT
Start: 2022-10-18

## 2022-10-23 RX ORDER — ESZOPICLONE 2 MG/1
TABLET, FILM COATED ORAL
Qty: 30 TABLET | Refills: 0 | Status: SHIPPED | OUTPATIENT
Start: 2022-10-23

## 2022-11-25 DIAGNOSIS — G47.00 INSOMNIA, UNSPECIFIED TYPE: ICD-10-CM

## 2022-11-25 RX ORDER — ESZOPICLONE 2 MG/1
TABLET, FILM COATED ORAL
Qty: 30 TABLET | Refills: 0 | Status: SHIPPED | OUTPATIENT
Start: 2022-11-25

## 2022-12-06 RX ORDER — LEVOTHYROXINE SODIUM 88 UG/1
TABLET ORAL
Qty: 30 TABLET | Refills: 0 | Status: SHIPPED | OUTPATIENT
Start: 2022-12-06 | End: 2022-12-08 | Stop reason: SDUPTHER

## 2022-12-08 ENCOUNTER — OFFICE VISIT (OUTPATIENT)
Dept: INTERNAL MEDICINE CLINIC | Age: 52
End: 2022-12-08
Payer: MEDICAID

## 2022-12-08 VITALS — OXYGEN SATURATION: 98 % | SYSTOLIC BLOOD PRESSURE: 130 MMHG | HEART RATE: 100 BPM | DIASTOLIC BLOOD PRESSURE: 100 MMHG

## 2022-12-08 DIAGNOSIS — I10 PRIMARY HYPERTENSION: ICD-10-CM

## 2022-12-08 DIAGNOSIS — D72.829 LEUKOCYTOSIS, UNSPECIFIED TYPE: ICD-10-CM

## 2022-12-08 DIAGNOSIS — R79.89 ELEVATED SERUM CREATININE: ICD-10-CM

## 2022-12-08 DIAGNOSIS — E03.4 HYPOTHYROIDISM DUE TO ACQUIRED ATROPHY OF THYROID: ICD-10-CM

## 2022-12-08 DIAGNOSIS — R10.9 LEFT FLANK PAIN: Primary | ICD-10-CM

## 2022-12-08 DIAGNOSIS — R82.998 CRYSTALLURIA: ICD-10-CM

## 2022-12-08 DIAGNOSIS — N39.0 URINARY TRACT INFECTION WITHOUT HEMATURIA, SITE UNSPECIFIED: Primary | ICD-10-CM

## 2022-12-08 DIAGNOSIS — E66.01 MORBID OBESITY WITH BMI OF 45.0-49.9, ADULT (HCC): ICD-10-CM

## 2022-12-08 DIAGNOSIS — F32.89 OTHER DEPRESSION: ICD-10-CM

## 2022-12-08 DIAGNOSIS — K58.1 IRRITABLE BOWEL SYNDROME WITH CONSTIPATION: ICD-10-CM

## 2022-12-08 PROBLEM — F32.5 MAJOR DEPRESSIVE DISORDER WITH SINGLE EPISODE, IN FULL REMISSION (HCC): Status: ACTIVE | Noted: 2022-12-08

## 2022-12-08 PROBLEM — G62.9 NEUROPATHY: Status: ACTIVE | Noted: 2018-01-29

## 2022-12-08 PROBLEM — F33.9 MAJOR DEPRESSION, RECURRENT (HCC): Status: ACTIVE | Noted: 2018-06-27

## 2022-12-08 PROBLEM — I71.20 THORACIC AORTIC ANEURYSM (TAA): Status: ACTIVE | Noted: 2018-01-29

## 2022-12-08 PROBLEM — Q23.1 BICUSPID AORTIC VALVE: Status: ACTIVE | Noted: 2018-01-29

## 2022-12-08 PROBLEM — R13.10 DYSPHAGIA: Status: ACTIVE | Noted: 2018-01-29

## 2022-12-08 PROBLEM — M15.9 OSTEOARTHRITIS OF MULTIPLE JOINTS: Status: ACTIVE | Noted: 2018-01-29

## 2022-12-08 PROBLEM — R31.9 HEMATURIA: Status: ACTIVE | Noted: 2018-07-02

## 2022-12-08 PROBLEM — J32.9 CHRONIC SINUSITIS: Status: ACTIVE | Noted: 2018-02-23

## 2022-12-08 PROBLEM — I71.20 THORACIC AORTIC ANEURYSM (TAA) (HCC): Status: ACTIVE | Noted: 2018-01-29

## 2022-12-08 PROBLEM — N18.31 HYPERTENSIVE HEART DISEASE WITH DIASTOLIC HEART FAILURE AND STAGE 3A CHRONIC KIDNEY DISEASE (HCC): Status: ACTIVE | Noted: 2022-04-14

## 2022-12-08 PROBLEM — D49.89 THYMOMA: Status: ACTIVE | Noted: 2018-05-04

## 2022-12-08 PROBLEM — M54.30 SCIATICA: Status: ACTIVE | Noted: 2022-12-08

## 2022-12-08 PROBLEM — K44.9 HIATAL HERNIA: Status: ACTIVE | Noted: 2022-12-08

## 2022-12-08 PROBLEM — M47.816 DEGENERATIVE JOINT DISEASE (DJD) OF LUMBAR SPINE: Status: ACTIVE | Noted: 2022-12-08

## 2022-12-08 PROBLEM — R03.0 FINDING OF ABOVE NORMAL BLOOD PRESSURE: Status: ACTIVE | Noted: 2022-12-08

## 2022-12-08 PROBLEM — F41.9 ANXIETY: Status: ACTIVE | Noted: 2018-06-26

## 2022-12-08 PROBLEM — M51.379 DEGENERATION OF LUMBOSACRAL INTERVERTEBRAL DISC: Status: ACTIVE | Noted: 2022-12-08

## 2022-12-08 PROBLEM — I50.30 HYPERTENSIVE HEART DISEASE WITH DIASTOLIC HEART FAILURE AND STAGE 3A CHRONIC KIDNEY DISEASE (HCC): Status: ACTIVE | Noted: 2022-04-14

## 2022-12-08 PROBLEM — I13.0 HYPERTENSIVE HEART DISEASE WITH DIASTOLIC HEART FAILURE AND STAGE 3A CHRONIC KIDNEY DISEASE (HCC): Status: ACTIVE | Noted: 2022-04-14

## 2022-12-08 PROBLEM — M51.37 DEGENERATION OF LUMBOSACRAL INTERVERTEBRAL DISC: Status: ACTIVE | Noted: 2022-12-08

## 2022-12-08 PROBLEM — R30.0 DYSURIA: Status: ACTIVE | Noted: 2018-07-01

## 2022-12-08 PROBLEM — I83.893 VARICOSE VEINS OF BOTH LEGS WITH EDEMA: Status: ACTIVE | Noted: 2018-01-29

## 2022-12-08 PROBLEM — R60.0 EDEMA OF EXTREMITIES: Status: ACTIVE | Noted: 2018-01-29

## 2022-12-08 PROBLEM — Q23.81 BICUSPID AORTIC VALVE: Status: ACTIVE | Noted: 2018-01-29

## 2022-12-08 PROCEDURE — 3078F DIAST BP <80 MM HG: CPT | Performed by: INTERNAL MEDICINE

## 2022-12-08 PROCEDURE — 3074F SYST BP LT 130 MM HG: CPT | Performed by: INTERNAL MEDICINE

## 2022-12-08 PROCEDURE — 99214 OFFICE O/P EST MOD 30 MIN: CPT | Performed by: INTERNAL MEDICINE

## 2022-12-08 RX ORDER — LEVOTHYROXINE SODIUM 88 UG/1
88 TABLET ORAL DAILY
Qty: 90 TABLET | Refills: 1 | Status: SHIPPED | OUTPATIENT
Start: 2022-12-08

## 2022-12-08 RX ORDER — CARVEDILOL 12.5 MG/1
12.5 TABLET ORAL 2 TIMES DAILY WITH MEALS
Qty: 180 TABLET | Refills: 1 | Status: SHIPPED | OUTPATIENT
Start: 2022-12-08

## 2022-12-08 RX ORDER — BUPROPION HYDROCHLORIDE 150 MG/1
150 TABLET ORAL DAILY
Qty: 90 TABLET | Refills: 0 | Status: SHIPPED | OUTPATIENT
Start: 2022-12-08

## 2022-12-08 RX ORDER — LANOLIN ALCOHOL/MO/W.PET/CERES
500 CREAM (GRAM) TOPICAL
COMMUNITY

## 2022-12-08 NOTE — PROGRESS NOTES
[address] [facility] Internal Medicine  [address]  [place], [address]  Phone: [phone]      Sophia Sinclair (: 1970) is a 50 y.o. female, established patient, here for evaluation of the following chief complaint(s):  Labs, ED Follow-up, Follow Up Chronic Condition, Constipation, Flank Pain, and Urinary Frequency         SUBJECTIVE/OBJECTIVE:  HPI:  Patient is here for follow up, she recently had blood work. She states that she has had constipation and nausea on and off. Her stomach feels bloated and she almost went to the ER for the constipation. Patient stated she has history of IBS with constipation. She has tried Miralax, Doculax, and suppositories with no relief. She is not sure if she can Linzess. She also has left flank pain and urinary frequency. She has been up all night from urinating. She went to [facility] for bladder pain and she was told she had a UTI and she was treated with Macrobid. She need a refill on Levothyroxine. Patient stated her fibromyalgia and depression is a little worse during the cold weather and would like to see whether she can get some Wellbutrin. She stated she is waiting to arrange the counseling. She had J&J booster and annual Flu shot for . 2022 WBC 12.5, Hemo 14.4, Hema 44.4, Glu 96, Creat 1.16, GFR 58, K+ 4.1, URINE Cler, TSH 3.21. Prior to Admission medications    Medication Sig Start Date End Date Taking? Authorizing Provider   ascorbic acid (Vitamin C) 500 mg chewable tablet Take 500 mg by mouth. Yes Provider, Historical   levothyroxine (SYNTHROID) 88 mcg tablet Take 1 Tablet by mouth daily. 22  Yes Brianda Garcia MD   linaCLOtide (Linzess) 145 mcg cap capsule Take 1 Capsule by mouth Daily (before breakfast). 22  Yes Brianda Garcia MD   buPROPion XL (WELLBUTRIN XL) 150 mg tablet Take 1 Tablet by mouth daily.  22  Yes Brianda Garcia MD   carvediloL (COREG) 12.5 mg tablet Take 1 Tablet by mouth two (2) times daily (with meals). 12/8/22  Yes Lb Potts MD   eszopiclone Fleurette Feeling) 2 mg tablet TAKE ONE TABLET BY MOUTH ONCE NIGHTLY AS NEEDED 11/25/22  Yes Lb Potts MD   furosemide (LASIX) 20 mg tablet Take 1 Tablet by mouth daily. 4/21/22  Yes Cory MARTINEZ NP   topiramate (TOPAMAX) 50 mg tablet Take 50 mg by mouth daily. Yes Sami, MD Usha   cyclobenzaprine (FLEXERIL) 10 mg tablet Take 1 Tab by mouth three (3) times daily as needed for Muscle Spasm(s). 9/20/17  Yes Ca Leonard MD   Cholecalciferol, Vitamin D3, (VITAMIN D3) 2,000 unit cap capsule Take one tablet daily 7/26/17  Yes Ca Leonard MD   rizatriptan (MAXALT) 10 mg tablet TAKE ONE TABLET BY MOUTH AS NEEDED  MAY REPEAT IN 2 HOURS 7/25/17  Yes Jeremie Meyers MD   DULoxetine (CYMBALTA) 60 mg capsule Take 1 Cap by mouth daily. 3/24/17  Yes Ca Leonard MD   acyclovir (ZOVIRAX) 400 mg tablet Take 1 Tab by mouth five (5) times daily. Indications: as needed 1/18/17  Yes Jeremie Meyers MD   fluticasone [facility]) 50 mcg/actuation nasal spray 2 Sprays by Both Nostrils route daily. 10/11/16  Yes Jeremie Meyers MD   esomeprazole (NEXIUM) 20 mg capsule Take 1 Cap by mouth daily.  2/8/16  Yes Jeremie Meyers MD        Allergies   Allergen Reactions    Augmentin [Amoxicillin-Pot Clavulanate] Nausea and Vomiting    Avelox [Moxifloxacin] Other (comments)     \"Gave her really bad thrush\"    Bactrim [Sulfamethoprim] Rash    Codeine Other (comments)     Not Allergic    Percocet [Oxycodone-Acetaminophen] Other (comments)     \"Severe Migraines\"        Past Medical History:   Diagnosis Date    Aortic aneurysm (thoracic)     Arthritis     Chronic knee pain 3/24/2010    Chronic low back pain 3/24/2010    Chronic pain     Depression 3/24/2010    Fibromyalgia 11/24/2014    Genital herpes 3/24/2010    GERD (gastroesophageal reflux disease) 3/24/2010    H/O endoscopic retrograde cholangiopancreatography [id number]    Hyperlipidemia 3/24/2010    Hypothyroidism 3/24/2010    Migraines 3/24/2010    Obesity 3/24/2010    Persistent disorder of initiating or maintaining sleep 3/24/2010    Plantar fasciitis 3/24/2010        History reviewed. No pertinent family history. Past Surgical History:   Procedure Laterality Date    HX  SECTION      HX CHOLECYSTECTOMY  [id number]    HX CHOLECYSTECTOMY  2013    emergency cholectomy    HX GYN  7299,1350    hysterectomy,endometrial ablation    HX HYSTERECTOMY         Review of Systems   Constitutional:  Negative for chills and fever. HENT:  Negative for congestion, ear pain, nosebleeds, sinus pain, sore throat and tinnitus. Eyes:  Negative for redness. Respiratory:  Negative for cough and shortness of breath. Cardiovascular:  Negative for chest pain and palpitations. Gastrointestinal:  Positive for constipation. Negative for abdominal pain, diarrhea, nausea and vomiting. Endocrine: Negative for cold intolerance and polyuria. Genitourinary:  Negative for dysuria and hematuria. Left flank pain and bladder spasm   Musculoskeletal:  Negative for back pain and neck pain. Skin:  Negative for rash. Neurological:  Negative for dizziness and headaches. Psychiatric/Behavioral: Negative. Feels a little more depressed than usual     BP (!) 130/100   Pulse 100   SpO2 98%      Physical Exam  Constitutional:       General: She is not in acute distress. Appearance: Normal appearance. She is obese. HENT:      Head: Normocephalic and atraumatic. Right Ear: External ear normal.      Left Ear: External ear normal.      Nose: Nose normal.      Mouth/Throat:      Mouth: Mucous membranes are moist.   Eyes:      Extraocular Movements: Extraocular movements intact. Pupils: Pupils are equal, round, and reactive to light. Cardiovascular:      Rate and Rhythm: Normal rate and regular rhythm.    Pulmonary:      Effort: Pulmonary effort is normal.      Breath sounds: Normal breath sounds. Abdominal:      General: Bowel sounds are normal.      Palpations: Abdomen is soft. Tenderness: There is no abdominal tenderness. Comments: No vesicular rash over the left flank, no CVA tenderness, mild discomfort left upper quadrant. Musculoskeletal:         General: Normal range of motion. Cervical back: Neck supple. Skin:     General: Skin is warm and dry. Comments: Multiple tattoos present   Neurological:      General: No focal deficit present. Mental Status: She is alert and oriented to person, place, and time. ASSESSMENT/PLAN:  Below is the assessment and plan developed based on review of pertinent history, physical exam, labs, studies, and medications. 1. Left flank pain  Comments:  Has calcium oxalate crystals in the urine and she had a recent urinary infection,will check renal ultrasound, advised to continue fluid intake, will check urine culture  Orders:  -     US RENAL COMPLETE W CONT; Future  -     CULTURE, URINE; Future  -     URINALYSIS W/ REFLEX CULTURE; Future  2. Crystalluria  Comments:  Advised to increase water intake, will await renal ultrasound  Orders:  -     CULTURE, URINE; Future  3. Leukocytosis, unspecified type  Comments:  WBC is slightly high at 12.5 hemoglobin 14.4 hematocrit 44.4 platelet 5/3/1790 lymphocytes 5.6 will monitor  Orders:  -     CBC WITH AUTOMATED DIFF; Future  4. Elevated serum creatinine  Comments:  Creatinine slightly high at 1.16, advised to increase water intake to avoid Aleve, ibuprofen, naproxen  5. Morbid obesity with BMI of 45.0-49.9, adult ([facility] [address]  Comments:  Healthy diet and weight loss advised  6. Irritable bowel syndrome with constipation  Comments: Will check celiac panel and will try Linzess  Orders:  -     CELIAC DISEASE PROFILE (REFLEX TTG, IGG); Future  7.  Hypothyroidism due to acquired atrophy of thyroid  Comments:  TSH in the normal range at 3.210 advised to continue current thyroid medicine  Orders:  -     TSH 3RD GENERATION; Future  8. Other depression  Comments:  Counseled patient, will try Wellbutrin,advised to follow-up with  counselor  9. Primary hypertension  Comments:  Diastolic blood pressure is high,will increase carvedilol to 12.5 mg twice a day  Orders:  -     carvediloL (COREG) 12.5 mg tablet; Take 1 Tablet by mouth two (2) times daily (with meals). , Normal, Disp-180 Tablet, R-1D/c metoprolol, thanks  -     METABOLIC PANEL, COMPREHENSIVE; Future    Return if symptoms worsen or fail to improve. There are no Patient Instructions on file for this visit. Health Maintenance Due   Topic Date Due    Hepatitis C Screening  Never done    Colorectal Cancer Screening Combo  Never done    COVID-19 Vaccine (3 - Booster for Walter series) 02/21/2022    Lipid Screen  03/24/2022    Shingrix Vaccine Age 50> (1 of 2) Never done    Breast Cancer Screen Mammogram  12/08/2022        Aspects of this note may have been generated using voice recognition software. Despite editing, there may be unrecognized errors. An electronic signature was used to authenticate this note.   -- Nilo Garcia MD

## 2022-12-08 NOTE — PROGRESS NOTES
Chief Complaint   Patient presents with    Labs    ED Follow-up    Follow Up Chronic Condition    Constipation    Flank Pain    Urinary Frequency     1. Have you been to the ER, urgent care clinic since your last visit? Hospitalized since your last visit? Yes [address] [place] [address] [address]    2. Have you seen or consulted any other health care providers outside of the [address] [place] [address] since your last visit? Include any pap smears or colon screening.  No

## 2022-12-13 ENCOUNTER — TELEPHONE (OUTPATIENT)
Dept: INTERNAL MEDICINE CLINIC | Age: 52
End: 2022-12-13

## 2022-12-13 NOTE — TELEPHONE ENCOUNTER
----- Message from Brent Delgado MD sent at 12/12/2022 11:18 AM EST -----  Continue same dose of Coumadin, repeat INR in one week

## 2022-12-14 ENCOUNTER — TELEPHONE (OUTPATIENT)
Dept: CARDIOLOGY CLINIC | Age: 52
End: 2022-12-14

## 2022-12-14 NOTE — TELEPHONE ENCOUNTER
I called [facility]. The patient got other testing today; not a CT. She has an ascending aortic aneurysm. We will need to call her insurance to get authorization. I left a message on the patient's voice mail for her to call me tomorrow regarding the CT.

## 2022-12-15 ENCOUNTER — TELEPHONE (OUTPATIENT)
Dept: CARDIOLOGY CLINIC | Age: 52
End: 2022-12-15

## 2022-12-15 NOTE — TELEPHONE ENCOUNTER
I called her insurance. [place] for CTA w/wo contrast for thoracic aortic aneurysm. Tracking number [id number]. Insurance phone # [phone]    Pt ID number: [id number] for [place] [employer]. Also, echo did not require authorization.

## 2022-12-18 ENCOUNTER — TELEPHONE (OUTPATIENT)
Dept: INTERNAL MEDICINE CLINIC | Age: 52
End: 2022-12-18

## 2022-12-18 DIAGNOSIS — R07.89 OTHER CHEST PAIN: Primary | ICD-10-CM

## 2022-12-18 RX ORDER — NITROGLYCERIN 0.4 MG/1
TABLET SUBLINGUAL
Qty: 50 TABLET | Refills: 0 | Status: SHIPPED | OUTPATIENT
Start: 2022-12-18

## 2022-12-18 NOTE — TELEPHONE ENCOUNTER
Patient called and stated she has chest pain and shortness of breath,denies any radiation of the pain to the arm or back,pain is in the mid chest. Is waiting for chest CT ordered by Dr Clement Gama . Would like to try NTG ,doesn't want to go to ER ,had seen Dr Andreina Desouza in the past but not recently. Will try NTG sublingual tab ,to continue Nexium,if not better to come to ER.  Will get follow up with Dr Andreina Desouza

## 2022-12-22 DIAGNOSIS — F32.1 MODERATE SINGLE CURRENT EPISODE OF MAJOR DEPRESSIVE DISORDER (HCC): ICD-10-CM

## 2022-12-22 DIAGNOSIS — M79.7 FIBROMYALGIA: ICD-10-CM

## 2022-12-23 RX ORDER — DULOXETIN HYDROCHLORIDE 60 MG/1
CAPSULE, DELAYED RELEASE ORAL
Qty: 30 CAPSULE | Refills: 3 | Status: SHIPPED | OUTPATIENT
Start: 2022-12-23

## 2022-12-28 DIAGNOSIS — G47.00 INSOMNIA, UNSPECIFIED TYPE: ICD-10-CM

## 2022-12-28 RX ORDER — ESZOPICLONE 2 MG/1
TABLET, FILM COATED ORAL
Qty: 90 TABLET | Refills: 0 | Status: SHIPPED | OUTPATIENT
Start: 2022-12-28

## 2023-01-04 ENCOUNTER — TRANSCRIBE ORDER (OUTPATIENT)
Dept: SCHEDULING | Age: 53
End: 2023-01-04

## 2023-01-04 DIAGNOSIS — Z12.31 SCREENING MAMMOGRAM FOR HIGH-RISK PATIENT: Primary | ICD-10-CM

## 2023-02-07 ENCOUNTER — HOSPITAL ENCOUNTER (OUTPATIENT)
Dept: CT IMAGING | Age: 53
End: 2023-02-07
Attending: NURSE PRACTITIONER
Payer: MEDICAID

## 2023-02-07 ENCOUNTER — HOSPITAL ENCOUNTER (OUTPATIENT)
Dept: NON INVASIVE DIAGNOSTICS | Age: 53
Discharge: HOME | End: 2023-02-07
Attending: NURSE PRACTITIONER
Payer: MEDICAID

## 2023-02-07 DIAGNOSIS — I71.20 THORACIC AORTIC ANEURYSM WITHOUT RUPTURE, UNSPECIFIED PART: ICD-10-CM

## 2023-02-07 PROCEDURE — 74011000636 HC RX REV CODE- 636: Performed by: NURSE PRACTITIONER

## 2023-02-07 PROCEDURE — 93306 TTE W/DOPPLER COMPLETE: CPT

## 2023-02-07 PROCEDURE — 71275 CT ANGIOGRAPHY CHEST: CPT

## 2023-02-07 RX ADMIN — IOPAMIDOL 100 ML: 755 INJECTION, SOLUTION INTRAVENOUS at 12:52

## 2023-03-19 ENCOUNTER — HOSPITAL ENCOUNTER (EMERGENCY)
Age: 53
Discharge: HOME | End: 2023-03-20
Attending: STUDENT IN AN ORGANIZED HEALTH CARE EDUCATION/TRAINING PROGRAM
Payer: MEDICAID

## 2023-03-19 VITALS
HEIGHT: 69 IN | WEIGHT: 293 LBS | HEART RATE: 84 BPM | BODY MASS INDEX: 43.4 KG/M2 | TEMPERATURE: 98.2 F | SYSTOLIC BLOOD PRESSURE: 120 MMHG | OXYGEN SATURATION: 98 % | DIASTOLIC BLOOD PRESSURE: 76 MMHG | RESPIRATION RATE: 16 BRPM

## 2023-03-19 DIAGNOSIS — N39.0 URINARY TRACT INFECTION WITHOUT HEMATURIA, SITE UNSPECIFIED: Primary | ICD-10-CM

## 2023-03-19 LAB
APPEARANCE UR: CLEAR
BACTERIA URNS QL MICRO: ABNORMAL /HPF
BILIRUB UR QL: NEGATIVE
COLOR UR: YELLOW
EPITH CASTS URNS QL MICRO: ABNORMAL /LPF
GLUCOSE UR STRIP.AUTO-MCNC: NEGATIVE MG/DL
HGB UR QL STRIP: NEGATIVE
KETONES UR QL STRIP.AUTO: NEGATIVE MG/DL
LEUKOCYTE ESTERASE UR QL STRIP.AUTO: NEGATIVE
NITRITE UR QL STRIP.AUTO: NEGATIVE
PH UR STRIP: 5 (ref 5–8)
PROT UR STRIP-MCNC: NEGATIVE MG/DL
RBC #/AREA URNS HPF: ABNORMAL /HPF (ref 0–5)
SP GR UR REFRACTOMETRY: 1.01 (ref 1–1.03)
UROBILINOGEN UR QL STRIP.AUTO: 0.1 EU/DL (ref 0.2–1)
WBC URNS QL MICRO: ABNORMAL /HPF (ref 0–4)

## 2023-03-19 PROCEDURE — 99283 EMERGENCY DEPT VISIT LOW MDM: CPT

## 2023-03-19 PROCEDURE — 81001 URINALYSIS AUTO W/SCOPE: CPT

## 2023-03-19 RX ORDER — ACETAMINOPHEN 325 MG/1
975 TABLET ORAL
Status: COMPLETED | OUTPATIENT
Start: 2023-03-20 | End: 2023-03-20

## 2023-03-20 PROCEDURE — 74011250637 HC RX REV CODE- 250/637: Performed by: STUDENT IN AN ORGANIZED HEALTH CARE EDUCATION/TRAINING PROGRAM

## 2023-03-20 RX ORDER — NITROFURANTOIN 25; 75 MG/1; MG/1
100 CAPSULE ORAL 2 TIMES DAILY
Qty: 10 CAPSULE | Refills: 0 | Status: SHIPPED | OUTPATIENT
Start: 2023-03-20 | End: 2023-03-25

## 2023-03-20 RX ORDER — NITROFURANTOIN 25; 75 MG/1; MG/1
100 CAPSULE ORAL
Status: COMPLETED | OUTPATIENT
Start: 2023-03-20 | End: 2023-03-20

## 2023-03-20 RX ADMIN — ACETAMINOPHEN 975 MG: 325 TABLET ORAL at 00:01

## 2023-03-20 RX ADMIN — NITROFURANTOIN (MONOHYDRATE/MACROCRYSTALS) 100 MG: 75; 25 CAPSULE ORAL at 00:10

## 2023-03-20 NOTE — ED PROVIDER NOTES
[facility] EMERGENCY DEPARTMENT  EMERGENCY DEPARTMENT HISTORY AND PHYSICAL EXAM      Date: 3/19/2023  Patient Name: Nishi Duke  MRN: [id number]  [place]: 1970  Date of evaluation: 3/19/2023  Provider: Evan Galvan MD   Note Started: 12:01 AM 3/20/23    HISTORY OF PRESENT ILLNESS     Chief Complaint   Patient presents with    Urinary Pain       History Provided By: Patient    HPI: Nishi Duke, 50 y.o. female presents emergency department for pain burning on urination x1 day. Patient states she has a history of recurrent UTIs, states this morning she started having some pelvic pain, burning, typical of her UTI type pain has been taking Pyridium with minimal relief, patient denies any fevers chills no nausea vomiting no back or flank pain. PAST MEDICAL HISTORY   Past Medical History:  Past Medical History:   Diagnosis Date    Aortic aneurysm (thoracic)     Arthritis     Chronic knee pain 3/24/2010    Chronic low back pain 3/24/2010    Chronic pain     Depression 3/24/2010    Fibromyalgia 2014    Genital herpes 3/24/2010    GERD (gastroesophageal reflux disease) 3/24/2010    H/O endoscopic retrograde cholangiopancreatography [id number]    Hyperlipidemia 3/24/2010    Hypothyroidism 3/24/2010    Migraines 3/24/2010    Obesity 3/24/2010    Persistent disorder of initiating or maintaining sleep 3/24/2010    Plantar fasciitis 3/24/2010       Past Surgical History:  Past Surgical History:   Procedure Laterality Date    HX  SECTION      HX CHOLECYSTECTOMY  [id number]    HX CHOLECYSTECTOMY  2013    emergency cholectomy    HX GYN  4643,1858    hysterectomy,endometrial ablation    HX HYSTERECTOMY         Family History:  No family history on file.     Social History:  Social History     Tobacco Use    Smoking status: Former    Smokeless tobacco: Never    Tobacco comments:     quit 9 yrs ago   Vaping Use    Vaping Use: Never used   Substance Use Topics    Alcohol use: Not Currently     Comment: Socially Drug use: No     Types: OTC, Prescription       Allergies: Allergies   Allergen Reactions    Augmentin [Amoxicillin-Pot Clavulanate] Nausea and Vomiting    Bactrim [Sulfamethoprim] Rash    Percocet [Oxycodone-Acetaminophen] Other (comments)     \"Severe Migraines\"       PCP: Linda Segal MD    Current Meds:   Previous Medications    ACYCLOVIR (ZOVIRAX) 400 MG TABLET    Take 1 Tab by mouth five (5) times daily. Indications: as needed    ASCORBIC ACID (VITAMIN C) 500 MG CHEWABLE TABLET    Take 500 mg by mouth. BUPROPION XL (WELLBUTRIN XL) 150 MG TABLET    Take 1 Tablet by mouth daily. CARVEDILOL (COREG) 12.5 MG TABLET    Take 1 Tablet by mouth two (2) times daily (with meals). CHOLECALCIFEROL, VITAMIN D3, (VITAMIN D3) 2,000 UNIT CAP CAPSULE    Take one tablet daily    CYCLOBENZAPRINE (FLEXERIL) 10 MG TABLET    Take 1 Tab by mouth three (3) times daily as needed for Muscle Spasm(s). DULOXETINE (CYMBALTA) 60 MG CAPSULE    TAKE ONE CAPSULE BY MOUTH DAILY    ESOMEPRAZOLE (NEXIUM) 20 MG CAPSULE    Take 1 Cap by mouth daily. ESZOPICLONE (LUNESTA) 2 MG TABLET    TAKE ONE TABLET BY MOUTH ONCE NIGHTLY AS NEEDED    FLUTICASONE (FLONASE) 50 MCG/ACTUATION NASAL SPRAY    2 Sprays by Both Nostrils route daily. FUROSEMIDE (LASIX) 20 MG TABLET    Take 1 Tablet by mouth daily. LEVOTHYROXINE (SYNTHROID) 88 MCG TABLET    Take 1 Tablet by mouth daily. LINACLOTIDE (LINZESS) 145 MCG CAP CAPSULE    Take 1 Capsule by mouth Daily (before breakfast). NITROGLYCERIN (NITROSTAT) 0.4 MG SL TABLET    Up to 3 doses. RIZATRIPTAN (MAXALT) 10 MG TABLET    TAKE ONE TABLET BY MOUTH AS NEEDED  MAY REPEAT IN 2 HOURS    TOPIRAMATE (TOPAMAX) 50 MG TABLET    Take 50 mg by mouth daily. REVIEW OF SYSTEMS   Review of Systems  Positives and Pertinent negatives as per HPI.     PHYSICAL EXAM     ED Triage Vitals   ED Encounter Vitals Group      BP 03/19/23 2238 120/76      Pulse (Heart Rate) 03/19/23 2245 84      Resp Rate 03/19/23 2238 16      Temp 03/19/23 2238 98.2 °F (36.8 °C)      Temp src --       O2 Sat (%) 03/19/23 2238 98 %      Weight 03/19/23 2238 300 lb      Height 03/19/23 2238 5' 9\"      Physical Exam    SCREENINGS               No data recorded        LAB, EKG AND DIAGNOSTIC RESULTS   Labs:  Recent Results (from the past 12 hour(s))   URINALYSIS W/MICROSCOPIC    Collection Time: 03/19/23 11:35 PM   Result Value Ref Range    Color Yellow      Appearance Clear Clear      Specific gravity 1.010 1.003 - 1.030      pH (UA) 5.0 5.0 - 8.0      Protein Negative Negative mg/dL    Glucose Negative Negative mg/dL    Ketone Negative Negative mg/dL    Bilirubin Negative Negative      Blood Negative Negative      Urobilinogen 0.1 (L) 0.2 - 1.0 EU/dL    Nitrites Negative Negative      Leukocyte Esterase Negative Negative      WBC 0-4 0 - 4 /hpf    RBC 0-5 0 - 5 /hpf    Epithelial cells Few Few /lpf    Bacteria 1+ (A) Negative /hpf           Radiologic Studies:  Non-plain film images such as CT, Ultrasound and MRI are read by the radiologist. Plain radiographic images are visualized and preliminarily interpreted by the ED Provider with the below findings:    *    Interpretation per the Radiologist below, if available at the time of this note:  No results found. EKG: interpreted by myself    PROCEDURES   Unless otherwise noted below, none.   Performed by: Allegra Montalvo MD   Procedures      CRITICAL CARE TIME       ED COURSE and DIFFERENTIAL DIAGNOSIS/MDM   Vitals:    Vitals:    03/19/23 2238 03/19/23 2245   BP: 120/76    Pulse:  84   Resp: 16    Temp: 98.2 °F (36.8 °C)    SpO2: 98%    Weight: 136.1 kg (300 lb)    Height: 5' 9\" (1.753 m)         Patient was given the following medications:  Medications   acetaminophen (TYLENOL) tablet 975 mg (975 mg Oral Given 3/20/23 0001)             Records Reviewed (source and summary of external notes): None    CC/HPI Summary, DDx, ED Course, and Reassessment: 82-year-old female history of recurrent UTIs presents emergency department for pelvic pain, pain burning on urination x1 day. Physical exam shows well-appearing female no distress, stable vitals, abdomen soft mild left lower quadrant tenderness palpation no rebound or guarding. UA sent which shows bacteria and small white blood cell count. Will treat for UTI as patient symptomatic. Discharge patient home with prescription for nitrofurantoin, this patient reported allergies to Augmentin and Bactrim. Disposition Considerations (Tests not done, Shared Decision Making, Pt Expectation of Test or Treatment.):      FINAL IMPRESSION     1. Urinary tract infection without hematuria, site unspecified          DISPOSITION/PLAN   Discharged    Discharge Note: The patient is stable for discharge home. The signs, symptoms, diagnosis, and discharge instructions have been discussed, understanding conveyed, and agreed upon. The patient is to follow up as recommended or return to ER should their symptoms worsen. PATIENT REFERRED TO:  Follow-up Information       Follow up With Specialties Details Why Contact Info    Migdalia Goldberg, MD Internal Medicine Physician   [facility]  [phone]                DISCHARGE MEDICATIONS:  Current Discharge Medication List        START taking these medications    Details   nitrofurantoin, macrocrystal-monohydrate, (Macrobid) 100 mg capsule Take 1 Capsule by mouth two (2) times a day for 5 days. Qty: 10 Capsule, Refills: 0  Start date: 3/20/2023, End date: 3/25/2023               DISCONTINUED MEDICATIONS:  Current Discharge Medication List          I am the Primary Clinician of Record: Angel Martinez MD (electronically signed)    (Please note that parts of this dictation were completed with voice recognition software. Quite often unanticipated grammatical, syntax, homophones, and other interpretive errors are inadvertently transcribed by the computer software.  Please disregards these errors.  Please excuse any errors that have escaped final proofreading.)

## 2023-03-28 RX ORDER — RIZATRIPTAN BENZOATE 10 MG/1
10 TABLET ORAL
Qty: 12 TABLET | Refills: 2 | Status: SHIPPED | OUTPATIENT
Start: 2023-03-28 | End: 2023-03-28

## 2023-03-28 NOTE — TELEPHONE ENCOUNTER
Future Appointments:  Future Appointments   Date Time Provider Pepe Roldan   3/28/2023  9:15 AM [facility] 2 [facility]   3/29/2023  2:15 PM Saurabh Christianson MD [facility]   4/11/2023 10:00 AM Araceli Villalobos MD [facility]   3/29/2024  1:00 PM [facility] 2 [facility]        Last Appointment With Me:  Visit date not found     Requested Prescriptions     Pending Prescriptions Disp Refills    rizatriptan (MAXALT) 10 mg tablet 12 Tablet 32     Sig: May repeat in 2 hours if needed

## 2023-03-31 ENCOUNTER — HOSPITAL ENCOUNTER (OUTPATIENT)
Dept: MAMMOGRAPHY | Age: 53
End: 2023-03-31
Payer: MEDICAID

## 2023-03-31 DIAGNOSIS — Z12.31 SCREENING MAMMOGRAM FOR HIGH-RISK PATIENT: ICD-10-CM

## 2023-03-31 PROCEDURE — 77063 BREAST TOMOSYNTHESIS BI: CPT

## 2023-04-01 DIAGNOSIS — G47.00 INSOMNIA, UNSPECIFIED TYPE: ICD-10-CM

## 2023-04-03 RX ORDER — ESZOPICLONE 2 MG/1
2 TABLET, FILM COATED ORAL
Qty: 90 TABLET | Refills: 0 | Status: SHIPPED | OUTPATIENT
Start: 2023-04-03

## 2023-04-16 DIAGNOSIS — J31.0 CHRONIC RHINITIS: ICD-10-CM

## 2023-04-17 RX ORDER — FLUTICASONE PROPIONATE 50 MCG
2 SPRAY, SUSPENSION (ML) NASAL DAILY
Qty: 1 EACH | Refills: 5 | Status: SHIPPED | OUTPATIENT
Start: 2023-04-17

## 2023-04-17 NOTE — TELEPHONE ENCOUNTER
Future Appointments:  Future Appointments   Date Time Provider Pepe Roldan   2023  9:00 AM [facility]   3/29/2024  1:00 PM [address]        Last Appointment With Me:  Visit date not found     Requested Prescriptions     Pending Prescriptions Disp Refills    fluticasone propionate (FLONASE) 50 mcg/actuation nasal spray 1 Each 5     Si Sprays by Both Nostrils route daily.

## 2023-04-21 DIAGNOSIS — M54.41 CHRONIC RIGHT-SIDED LOW BACK PAIN WITH RIGHT-SIDED SCIATICA: Primary | ICD-10-CM

## 2023-04-21 DIAGNOSIS — M54.16 LUMBAR RADICULOPATHY: ICD-10-CM

## 2023-04-21 DIAGNOSIS — G89.29 CHRONIC RIGHT-SIDED LOW BACK PAIN WITH RIGHT-SIDED SCIATICA: Primary | ICD-10-CM

## 2023-04-22 DIAGNOSIS — I10 PRIMARY HYPERTENSION: ICD-10-CM

## 2023-04-22 DIAGNOSIS — I10 PRIMARY HYPERTENSION: Primary | ICD-10-CM

## 2023-04-23 DIAGNOSIS — E03.4 HYPOTHYROIDISM DUE TO ACQUIRED ATROPHY OF THYROID: ICD-10-CM

## 2023-04-23 DIAGNOSIS — D72.829 LEUKOCYTOSIS, UNSPECIFIED TYPE: ICD-10-CM

## 2023-04-23 DIAGNOSIS — E03.4 HYPOTHYROIDISM DUE TO ACQUIRED ATROPHY OF THYROID: Primary | ICD-10-CM

## 2023-04-23 DIAGNOSIS — D72.829 LEUKOCYTOSIS, UNSPECIFIED TYPE: Primary | ICD-10-CM

## 2023-04-24 DIAGNOSIS — K58.1 IRRITABLE BOWEL SYNDROME WITH CONSTIPATION: Primary | ICD-10-CM

## 2023-04-24 DIAGNOSIS — K58.1 IRRITABLE BOWEL SYNDROME WITH CONSTIPATION: ICD-10-CM

## 2023-05-03 ENCOUNTER — OFFICE VISIT (OUTPATIENT)
Dept: INTERNAL MEDICINE CLINIC | Age: 53
End: 2023-05-03
Payer: MEDICAID

## 2023-05-03 ENCOUNTER — HOSPITAL ENCOUNTER (EMERGENCY)
Age: 53
Discharge: HOME | End: 2023-05-03
Attending: EMERGENCY MEDICINE
Payer: MEDICAID

## 2023-05-03 VITALS
HEIGHT: 69 IN | WEIGHT: 293 LBS | RESPIRATION RATE: 16 BRPM | DIASTOLIC BLOOD PRESSURE: 88 MMHG | SYSTOLIC BLOOD PRESSURE: 120 MMHG | OXYGEN SATURATION: 97 % | TEMPERATURE: 98.4 F | BODY MASS INDEX: 43.4 KG/M2 | HEART RATE: 84 BPM

## 2023-05-03 VITALS
HEART RATE: 99 BPM | BODY MASS INDEX: 43.4 KG/M2 | OXYGEN SATURATION: 98 % | HEIGHT: 69 IN | DIASTOLIC BLOOD PRESSURE: 80 MMHG | TEMPERATURE: 98.7 F | WEIGHT: 293 LBS | SYSTOLIC BLOOD PRESSURE: 130 MMHG

## 2023-05-03 DIAGNOSIS — R23.2 HOT FLASHES: ICD-10-CM

## 2023-05-03 DIAGNOSIS — F32.1 MODERATE SINGLE CURRENT EPISODE OF MAJOR DEPRESSIVE DISORDER (HCC): ICD-10-CM

## 2023-05-03 DIAGNOSIS — E66.01 MORBID OBESITY WITH BMI OF 45.0-49.9, ADULT (HCC): ICD-10-CM

## 2023-05-03 DIAGNOSIS — R82.71 BACTERIURIA: ICD-10-CM

## 2023-05-03 DIAGNOSIS — R79.89 ELEVATED SERUM CREATININE: Primary | ICD-10-CM

## 2023-05-03 DIAGNOSIS — Z11.59 ENCOUNTER FOR HEPATITIS C SCREENING TEST FOR LOW RISK PATIENT: ICD-10-CM

## 2023-05-03 DIAGNOSIS — R30.0 DYSURIA: Primary | ICD-10-CM

## 2023-05-03 DIAGNOSIS — R07.89 ATYPICAL CHEST PAIN: ICD-10-CM

## 2023-05-03 DIAGNOSIS — E55.9 VITAMIN D DEFICIENCY: ICD-10-CM

## 2023-05-03 DIAGNOSIS — M79.7 FIBROMYALGIA: ICD-10-CM

## 2023-05-03 DIAGNOSIS — Z13.220 SCREENING CHOLESTEROL LEVEL: ICD-10-CM

## 2023-05-03 LAB
APPEARANCE UR: CLEAR
BACTERIA URNS QL MICRO: ABNORMAL /HPF
BILIRUB UR QL: NEGATIVE
COLOR UR: ABNORMAL
EPITH CASTS URNS QL MICRO: ABNORMAL /LPF
GLUCOSE UR STRIP.AUTO-MCNC: NEGATIVE MG/DL
HGB UR QL STRIP: NEGATIVE
KETONES UR QL STRIP.AUTO: NEGATIVE MG/DL
LEUKOCYTE ESTERASE UR QL STRIP.AUTO: NEGATIVE
NITRITE UR QL STRIP.AUTO: NEGATIVE
PH UR STRIP: 6.5 (ref 5–8)
PROT UR STRIP-MCNC: NEGATIVE MG/DL
RBC #/AREA URNS HPF: ABNORMAL /HPF
SP GR UR REFRACTOMETRY: 1.02 (ref 1–1.03)
UR CULT HOLD, URHOLD: NORMAL
UROBILINOGEN UR QL STRIP.AUTO: 0.2 EU/DL (ref 0.2–1)
WBC URNS QL MICRO: ABNORMAL /HPF (ref 0–4)

## 2023-05-03 PROCEDURE — 99214 OFFICE O/P EST MOD 30 MIN: CPT | Performed by: INTERNAL MEDICINE

## 2023-05-03 PROCEDURE — 81001 URINALYSIS AUTO W/SCOPE: CPT

## 2023-05-03 PROCEDURE — 3079F DIAST BP 80-89 MM HG: CPT | Performed by: INTERNAL MEDICINE

## 2023-05-03 PROCEDURE — 3074F SYST BP LT 130 MM HG: CPT | Performed by: INTERNAL MEDICINE

## 2023-05-03 PROCEDURE — 99283 EMERGENCY DEPT VISIT LOW MDM: CPT

## 2023-05-03 RX ORDER — RIZATRIPTAN BENZOATE 10 MG/1
1 TABLET ORAL AS NEEDED
COMMUNITY
Start: 2023-03-29

## 2023-05-03 RX ORDER — CELECOXIB 100 MG/1
100 CAPSULE ORAL DAILY
Qty: 90 CAPSULE | Refills: 0 | Status: CANCELLED | OUTPATIENT
Start: 2023-05-03

## 2023-05-03 RX ORDER — ONDANSETRON 4 MG/1
1 TABLET, ORALLY DISINTEGRATING ORAL
COMMUNITY
Start: 2023-04-01

## 2023-05-03 RX ORDER — DULOXETIN HYDROCHLORIDE 60 MG/1
60 CAPSULE, DELAYED RELEASE ORAL DAILY
Qty: 90 CAPSULE | Refills: 0 | Status: SHIPPED | OUTPATIENT
Start: 2023-05-03

## 2023-05-03 RX ORDER — ZOSTER VACCINE RECOMBINANT, ADJUVANTED 50 MCG/0.5
0.5 KIT INTRAMUSCULAR ONCE
Qty: 1 EACH | Refills: 1 | Status: SHIPPED | OUTPATIENT
Start: 2023-05-03 | End: 2023-05-03

## 2023-05-03 RX ORDER — PHENAZOPYRIDINE HYDROCHLORIDE 200 MG/1
200 TABLET, FILM COATED ORAL 3 TIMES DAILY
Qty: 6 TABLET | Refills: 0 | Status: SHIPPED | OUTPATIENT
Start: 2023-05-03 | End: 2023-05-05

## 2023-05-03 RX ORDER — NITROFURANTOIN 25; 75 MG/1; MG/1
100 CAPSULE ORAL 2 TIMES DAILY
Qty: 10 CAPSULE | Refills: 0 | Status: SHIPPED | OUTPATIENT
Start: 2023-05-03 | End: 2023-05-08

## 2023-05-03 RX ORDER — CELECOXIB 100 MG/1
100 CAPSULE ORAL DAILY
COMMUNITY

## 2023-05-04 ENCOUNTER — TELEPHONE (OUTPATIENT)
Dept: INTERNAL MEDICINE CLINIC | Age: 53
End: 2023-05-04

## 2023-05-04 DIAGNOSIS — G47.00 INSOMNIA, UNSPECIFIED TYPE: ICD-10-CM

## 2023-05-04 RX ORDER — ESZOPICLONE 2 MG/1
2 TABLET, FILM COATED ORAL
Qty: 90 TABLET | Refills: 0 | Status: SHIPPED | OUTPATIENT
Start: 2023-05-04

## 2023-05-05 ENCOUNTER — TELEPHONE (OUTPATIENT)
Dept: INTERNAL MEDICINE CLINIC | Age: 53
End: 2023-05-05

## 2023-05-08 ENCOUNTER — TELEPHONE (OUTPATIENT)
Facility: CLINIC | Age: 53
End: 2023-05-08

## 2023-05-08 DIAGNOSIS — Z12.11 COLON CANCER SCREENING: Primary | ICD-10-CM

## 2023-05-08 RX ORDER — PHENAZOPYRIDINE HYDROCHLORIDE 100 MG/1
100 TABLET, FILM COATED ORAL 3 TIMES DAILY PRN
Qty: 10 TABLET | Refills: 0 | Status: SHIPPED | OUTPATIENT
Start: 2023-05-08 | End: 2024-05-07

## 2023-05-08 RX ORDER — MICONAZOLE NITRATE 4 %
1 CREAM WITH PREFILLED APPLICATOR VAGINAL DAILY
Qty: 1 EACH | Refills: 0 | Status: SHIPPED | OUTPATIENT
Start: 2023-05-08

## 2023-05-09 ENCOUNTER — TELEPHONE (OUTPATIENT)
Facility: CLINIC | Age: 53
End: 2023-05-09

## 2023-05-09 DIAGNOSIS — E55.9 VITAMIN D DEFICIENCY: Primary | ICD-10-CM

## 2023-05-09 DIAGNOSIS — Z11.59 ENCOUNTER FOR HEPATITIS C SCREENING TEST FOR LOW RISK PATIENT: Primary | ICD-10-CM

## 2023-05-10 DIAGNOSIS — Z11.59 ENCOUNTER FOR HEPATITIS C SCREENING TEST FOR LOW RISK PATIENT: ICD-10-CM

## 2023-05-10 DIAGNOSIS — E55.9 VITAMIN D DEFICIENCY: ICD-10-CM

## 2023-05-17 ENCOUNTER — HOSPITAL ENCOUNTER (OUTPATIENT)
Age: 53
Discharge: HOME | End: 2023-05-20
Payer: MEDICAID

## 2023-05-17 DIAGNOSIS — G89.29 CHRONIC RIGHT-SIDED LOW BACK PAIN WITH RIGHT-SIDED SCIATICA: ICD-10-CM

## 2023-05-17 DIAGNOSIS — M54.16 LUMBAR RADICULOPATHY: ICD-10-CM

## 2023-05-17 DIAGNOSIS — M54.41 CHRONIC RIGHT-SIDED LOW BACK PAIN WITH RIGHT-SIDED SCIATICA: ICD-10-CM

## 2023-05-17 PROCEDURE — 72148 MRI LUMBAR SPINE W/O DYE: CPT

## 2023-05-22 RX ORDER — BUPROPION HYDROCHLORIDE 150 MG/1
TABLET ORAL
Qty: 180 TABLET | Refills: 1 | Status: SHIPPED | OUTPATIENT
Start: 2023-05-22

## 2023-05-23 RX ORDER — ORAL SEMAGLUTIDE 3 MG/1
3 TABLET ORAL DAILY
Qty: 30 TABLET | Refills: 0 | Status: SHIPPED | OUTPATIENT
Start: 2023-05-23 | End: 2023-05-27

## 2023-05-27 RX ORDER — ORAL SEMAGLUTIDE 7 MG/1
1 TABLET ORAL DAILY
Qty: 30 TABLET | Refills: 0 | Status: SHIPPED | OUTPATIENT
Start: 2023-05-27

## 2023-05-31 ENCOUNTER — TELEPHONE (OUTPATIENT)
Facility: CLINIC | Age: 53
End: 2023-05-31

## 2023-05-31 NOTE — TELEPHONE ENCOUNTER
----- Message from Vilma Castillo sent at 5/31/2023  2:01 PM EDT -----  Regarding: Insurance   Contact: [phone]  My insurance will be changing in July I believe. Do you accept [employer]?

## 2023-06-02 LAB — NONINV COLON CA DNA+OCC BLD SCRN STL QL: NEGATIVE

## 2023-06-14 ENCOUNTER — TELEPHONE (OUTPATIENT)
Age: 53
End: 2023-06-14

## 2023-06-16 DIAGNOSIS — Z02.1 PRE-EMPLOYMENT EXAMINATION: Primary | ICD-10-CM

## 2023-07-06 LAB
GAMMA INTERFERON BACKGROUND BLD IA-ACNC: 0.49 IU/ML
M TB IFN-G BLD-IMP: NEGATIVE
M TB IFN-G CD4+ T-CELLS BLD-ACNC: 0.43 IU/ML
M TBIFN-G CD4+ CD8+T-CELLS BLD-ACNC: 0.39 IU/ML
MITOGEN IGNF BLD-ACNC: >10 IU/ML
QUANTIFERON, INCUBATION: NORMAL
SERVICE CMNT-IMP: NORMAL

## 2023-07-09 RX ORDER — NYSTATIN 100000 [USP'U]/G
POWDER TOPICAL 2 TIMES DAILY
Qty: 60 G | Refills: 1 | Status: SHIPPED | OUTPATIENT
Start: 2023-07-09

## 2023-07-09 RX ORDER — LIDOCAINE 50 MG/G
OINTMENT TOPICAL
Qty: 50 G | Refills: 0 | Status: SHIPPED | OUTPATIENT
Start: 2023-07-09

## 2023-07-19 RX ORDER — MICONAZOLE NITRATE 4 %
1 CREAM WITH PREFILLED APPLICATOR VAGINAL DAILY
Qty: 1 EACH | Refills: 0 | Status: SHIPPED | OUTPATIENT
Start: 2023-07-19

## 2023-07-19 RX ORDER — MICONAZOLE NITRATE 4 %
CREAM WITH PREFILLED APPLICATOR VAGINAL
Qty: 15 G | OUTPATIENT
Start: 2023-07-19

## 2023-07-22 RX ORDER — CARVEDILOL 12.5 MG/1
TABLET ORAL
Qty: 60 TABLET | OUTPATIENT
Start: 2023-07-22

## 2023-07-22 RX ORDER — MICONAZOLE NITRATE 4 %
CREAM WITH PREFILLED APPLICATOR VAGINAL
Qty: 15 G | OUTPATIENT
Start: 2023-07-22

## 2023-07-27 RX ORDER — CARVEDILOL 12.5 MG/1
TABLET ORAL
Qty: 60 TABLET | Refills: 1 | Status: SHIPPED | OUTPATIENT
Start: 2023-07-27

## 2023-08-05 RX ORDER — LIDOCAINE 50 MG/G
OINTMENT TOPICAL
Qty: 35.44 G | Refills: 1 | Status: SHIPPED | OUTPATIENT
Start: 2023-08-05

## 2023-08-09 DIAGNOSIS — E66.01 MORBID OBESITY WITH BMI OF 45.0-49.9, ADULT (HCC): Primary | ICD-10-CM

## 2023-08-11 RX ORDER — FUROSEMIDE 20 MG/1
20 TABLET ORAL DAILY
Qty: 90 TABLET | Refills: 0 | Status: SHIPPED | OUTPATIENT
Start: 2023-08-11

## 2023-08-14 RX ORDER — VALACYCLOVIR HYDROCHLORIDE 500 MG/1
500 TABLET, FILM COATED ORAL DAILY
Qty: 90 TABLET | Refills: 1 | Status: SHIPPED | OUTPATIENT
Start: 2023-08-14

## 2023-08-17 ENCOUNTER — TELEPHONE (OUTPATIENT)
Facility: CLINIC | Age: 53
End: 2023-08-17

## 2023-08-20 RX ORDER — DULOXETIN HYDROCHLORIDE 60 MG/1
CAPSULE, DELAYED RELEASE ORAL
Qty: 90 CAPSULE | Refills: 0 | Status: SHIPPED | OUTPATIENT
Start: 2023-08-20

## 2023-08-28 ENCOUNTER — TELEPHONE (OUTPATIENT)
Facility: CLINIC | Age: 53
End: 2023-08-28

## 2023-08-30 ENCOUNTER — TELEPHONE (OUTPATIENT)
Facility: CLINIC | Age: 53
End: 2023-08-30

## 2023-08-31 ENCOUNTER — TELEPHONE (OUTPATIENT)
Facility: CLINIC | Age: 53
End: 2023-08-31

## 2023-08-31 NOTE — TELEPHONE ENCOUNTER
I left a message on patients my chart and I also called patient 2 times today and left another voicemail.  I am trying to let patient know that we now have 2 availabilities tomorrow for 9 am and 3 pm.

## 2023-09-26 RX ORDER — CARVEDILOL 12.5 MG/1
TABLET ORAL
Qty: 60 TABLET | Refills: 1 | Status: SHIPPED | OUTPATIENT
Start: 2023-09-26

## 2023-09-30 RX ORDER — CARVEDILOL 12.5 MG/1
TABLET ORAL
Qty: 60 TABLET | Refills: 1 | OUTPATIENT
Start: 2023-09-30

## 2023-10-01 RX ORDER — CARVEDILOL 12.5 MG/1
12.5 TABLET ORAL 2 TIMES DAILY WITH MEALS
Qty: 60 TABLET | Refills: 1 | OUTPATIENT
Start: 2023-10-01

## 2023-10-04 ENCOUNTER — TELEPHONE (OUTPATIENT)
Facility: CLINIC | Age: 53
End: 2023-10-04

## 2023-10-04 NOTE — TELEPHONE ENCOUNTER
Left message for patient letting her know Deb Winslow is not in the office. I understood she needed a physical to go back to work. I advised would be happy to call and get her scheduled at another location. I also left the number and advised she could also call and get scheduled.

## 2023-10-18 ENCOUNTER — HOSPITAL ENCOUNTER (EMERGENCY)
Facility: HOSPITAL | Age: 53
Discharge: HOME | End: 2023-10-18
Attending: EMERGENCY MEDICINE
Payer: MEDICAID

## 2023-10-18 ENCOUNTER — APPOINTMENT (OUTPATIENT)
Facility: HOSPITAL | Age: 53
End: 2023-10-18
Payer: MEDICAID

## 2023-10-18 VITALS
RESPIRATION RATE: 18 BRPM | DIASTOLIC BLOOD PRESSURE: 80 MMHG | OXYGEN SATURATION: 96 % | HEIGHT: 69 IN | WEIGHT: 293 LBS | TEMPERATURE: 98.4 F | BODY MASS INDEX: 43.4 KG/M2 | SYSTOLIC BLOOD PRESSURE: 107 MMHG | HEART RATE: 83 BPM

## 2023-10-18 DIAGNOSIS — R42 DIZZINESS: Primary | ICD-10-CM

## 2023-10-18 DIAGNOSIS — B34.9 VIRAL SYNDROME: ICD-10-CM

## 2023-10-18 LAB
ALBUMIN SERPL-MCNC: 3.8 G/DL (ref 3.5–5.2)
ALBUMIN/GLOB SERPL: 1.2 (ref 1.1–2.2)
ALP SERPL-CCNC: 81 U/L (ref 35–104)
ALT SERPL-CCNC: 27 U/L (ref 10–35)
ANION GAP SERPL CALC-SCNC: 11 MMOL/L (ref 5–15)
AST SERPL-CCNC: 34 U/L (ref 10–35)
BASOPHILS # BLD: 0.1 K/UL (ref 0–1)
BASOPHILS NFR BLD: 1 % (ref 0–1)
BILIRUB SERPL-MCNC: 0.3 MG/DL (ref 0.2–1)
BUN SERPL-MCNC: 18 MG/DL (ref 6–20)
BUN/CREAT SERPL: 17 (ref 12–20)
CALCIUM SERPL-MCNC: 9.1 MG/DL (ref 8.6–10)
CHLORIDE SERPL-SCNC: 106 MMOL/L (ref 98–107)
CO2 SERPL-SCNC: 23 MMOL/L (ref 22–29)
CREAT SERPL-MCNC: 1.03 MG/DL (ref 0.5–0.9)
DIFFERENTIAL METHOD BLD: ABNORMAL
EKG ATRIAL RATE: 66 BPM
EKG DIAGNOSIS: NORMAL
EKG P AXIS: 40 DEGREES
EKG P-R INTERVAL: 162 MS
EKG Q-T INTERVAL: 392 MS
EKG QRS DURATION: 88 MS
EKG QTC CALCULATION (BAZETT): 410 MS
EKG R AXIS: 12 DEGREES
EKG T AXIS: 31 DEGREES
EKG VENTRICULAR RATE: 66 BPM
EOSINOPHIL # BLD: 0.4 K/UL (ref 0–0.4)
EOSINOPHIL NFR BLD: 5 %
ERYTHROCYTE [DISTWIDTH] IN BLOOD BY AUTOMATED COUNT: 14.6 % (ref 11.5–14.5)
FLUAV AG NPH QL IA: NEGATIVE
FLUBV AG NOSE QL IA: NEGATIVE
GLOBULIN SER CALC-MCNC: 3.1 G/DL (ref 2–4)
GLUCOSE SERPL-MCNC: 109 MG/DL (ref 65–100)
HCT VFR BLD AUTO: 44.9 % (ref 35–47)
HGB BLD-MCNC: 14 G/DL (ref 11.5–16)
IMM GRANULOCYTES # BLD AUTO: 0 K/UL (ref 0–0.04)
IMM GRANULOCYTES NFR BLD AUTO: 0 % (ref 0–0.5)
LIPASE SERPL-CCNC: 27 U/L (ref 13–60)
LYMPHOCYTES # BLD: 3.1 K/UL (ref 0.8–3.5)
LYMPHOCYTES NFR BLD: 39 % (ref 12–49)
MCH RBC QN AUTO: 29 PG (ref 26–34)
MCHC RBC AUTO-ENTMCNC: 31.2 G/DL (ref 30–36.5)
MCV RBC AUTO: 93 FL (ref 80–99)
MONOCYTES # BLD: 0.5 K/UL (ref 0–1)
MONOCYTES NFR BLD: 6 % (ref 5–13)
NEUTS SEG # BLD: 3.9 K/UL (ref 1.8–8)
NEUTS SEG NFR BLD: 49 % (ref 32–75)
NRBC # BLD: 0 K/UL (ref 0–0.01)
NRBC BLD-RTO: 0 PER 100 WBC
PLATELET # BLD AUTO: 293 K/UL (ref 150–400)
PMV BLD AUTO: 10.7 FL (ref 8.9–12.9)
POTASSIUM SERPL-SCNC: 5.1 MMOL/L (ref 3.5–5.1)
PROT SERPL-MCNC: 6.9 G/DL (ref 6.4–8.3)
RBC # BLD AUTO: 4.83 M/UL (ref 3.8–5.2)
SARS-COV-2 RDRP RESP QL NAA+PROBE: NOT DETECTED
SODIUM SERPL-SCNC: 140 MMOL/L (ref 136–145)
SOURCE: NORMAL
TROPONIN I BLD-MCNC: <0.04 NG/ML (ref 0–0.08)
WBC # BLD AUTO: 7.8 K/UL (ref 3.6–11)

## 2023-10-18 PROCEDURE — 71045 X-RAY EXAM CHEST 1 VIEW: CPT

## 2023-10-18 PROCEDURE — 87804 INFLUENZA ASSAY W/OPTIC: CPT

## 2023-10-18 PROCEDURE — 93005 ELECTROCARDIOGRAM TRACING: CPT | Performed by: EMERGENCY MEDICINE

## 2023-10-18 PROCEDURE — 36415 COLL VENOUS BLD VENIPUNCTURE: CPT

## 2023-10-18 PROCEDURE — 93010 ELECTROCARDIOGRAM REPORT: CPT | Performed by: INTERNAL MEDICINE

## 2023-10-18 PROCEDURE — 6360000002 HC RX W HCPCS: Performed by: EMERGENCY MEDICINE

## 2023-10-18 PROCEDURE — 87635 SARS-COV-2 COVID-19 AMP PRB: CPT

## 2023-10-18 PROCEDURE — 96374 THER/PROPH/DIAG INJ IV PUSH: CPT

## 2023-10-18 PROCEDURE — 85025 COMPLETE CBC W/AUTO DIFF WBC: CPT

## 2023-10-18 PROCEDURE — 83690 ASSAY OF LIPASE: CPT

## 2023-10-18 PROCEDURE — 80053 COMPREHEN METABOLIC PANEL: CPT

## 2023-10-18 PROCEDURE — 99285 EMERGENCY DEPT VISIT HI MDM: CPT

## 2023-10-18 PROCEDURE — 96361 HYDRATE IV INFUSION ADD-ON: CPT

## 2023-10-18 PROCEDURE — 84484 ASSAY OF TROPONIN QUANT: CPT

## 2023-10-18 PROCEDURE — 2580000003 HC RX 258: Performed by: EMERGENCY MEDICINE

## 2023-10-18 RX ORDER — ONDANSETRON 2 MG/ML
4 INJECTION INTRAMUSCULAR; INTRAVENOUS ONCE
Status: DISCONTINUED | OUTPATIENT
Start: 2023-10-18 | End: 2023-10-18 | Stop reason: HOSPADM

## 2023-10-18 RX ORDER — DROPERIDOL 2.5 MG/ML
1.25 INJECTION, SOLUTION INTRAMUSCULAR; INTRAVENOUS ONCE
Status: COMPLETED | OUTPATIENT
Start: 2023-10-18 | End: 2023-10-18

## 2023-10-18 RX ORDER — 0.9 % SODIUM CHLORIDE 0.9 %
500 INTRAVENOUS SOLUTION INTRAVENOUS ONCE
Status: COMPLETED | OUTPATIENT
Start: 2023-10-18 | End: 2023-10-18

## 2023-10-18 RX ADMIN — SODIUM CHLORIDE 500 ML: 9 INJECTION, SOLUTION INTRAVENOUS at 01:29

## 2023-10-18 RX ADMIN — DROPERIDOL 1.25 MG: 2.5 INJECTION, SOLUTION INTRAMUSCULAR; INTRAVENOUS at 01:28

## 2023-10-18 ASSESSMENT — PAIN - FUNCTIONAL ASSESSMENT: PAIN_FUNCTIONAL_ASSESSMENT: NONE - DENIES PAIN

## 2023-10-18 NOTE — ED NOTES
IV removed prior to dc. Pt given dc paperwork. Pt tolerated dc well. Pt stated understanding of teaching and denied questions. Pt ambulated out of ER with all belongings.      Vinicio Syed RN  10/18/23 8480

## 2023-10-18 NOTE — ED PROVIDER NOTES
ECG  When compared with ECG of 16-MAR-2009 06:35,  No significant change was found            CONSULTS:  None    PROCEDURES:  Unless otherwise noted below, none     Procedures      FINAL IMPRESSION      1. Dizziness    2.  Viral syndrome          DISPOSITION/PLAN   DISPOSITION Decision To Discharge 10/18/2023 02:05:22 AM      PATIENT REFERRED TO:  Anuj Frances MD  [address] [place] [address]  [phone]    Schedule an appointment as soon as possible for a visit         DISCHARGE MEDICATIONS:  Discharge Medication List as of 10/18/2023  2:05 AM            (Please note that portions of this note were completed with a voice recognition program.  Efforts were made to edit the dictations but occasionally words are mis-transcribed.)    Chan Bahena MD (electronically signed)  Emergency Attending Physician            Chan Bahena MD  10/18/23 9472

## 2023-10-29 DIAGNOSIS — G47.00 INSOMNIA, UNSPECIFIED TYPE: Primary | ICD-10-CM

## 2023-10-30 RX ORDER — ESZOPICLONE 2 MG/1
TABLET, FILM COATED ORAL
Qty: 30 TABLET | Refills: 0 | Status: SHIPPED | OUTPATIENT
Start: 2023-10-30 | End: 2024-01-28

## 2023-11-02 RX ORDER — CYCLOBENZAPRINE HCL 10 MG
10 TABLET ORAL 3 TIMES DAILY PRN
Qty: 45 TABLET | Refills: 0 | Status: SHIPPED | OUTPATIENT
Start: 2023-11-02

## 2023-11-09 RX ORDER — GLYCERIN .002; .002; .01 MG/MG; MG/MG; MG/MG
1 SOLUTION/ DROPS OPHTHALMIC 2 TIMES DAILY
Qty: 30 ML | Refills: 3 | Status: SHIPPED | OUTPATIENT
Start: 2023-11-09

## 2023-11-28 RX ORDER — FUROSEMIDE 20 MG/1
20 TABLET ORAL DAILY
Qty: 90 TABLET | Refills: 0 | Status: SHIPPED | OUTPATIENT
Start: 2023-11-28 | End: 2023-11-28 | Stop reason: SDUPTHER

## 2023-11-28 RX ORDER — FUROSEMIDE 20 MG/1
20 TABLET ORAL DAILY
Qty: 90 TABLET | Refills: 0 | Status: SHIPPED | OUTPATIENT
Start: 2023-11-28

## 2023-11-28 RX ORDER — CARVEDILOL 12.5 MG/1
12.5 TABLET ORAL 2 TIMES DAILY WITH MEALS
Qty: 60 TABLET | Refills: 1 | Status: SHIPPED | OUTPATIENT
Start: 2023-11-28 | End: 2024-01-20

## 2023-11-28 RX ORDER — DULOXETIN HYDROCHLORIDE 60 MG/1
CAPSULE, DELAYED RELEASE ORAL
Qty: 90 CAPSULE | Refills: 0 | Status: SHIPPED | OUTPATIENT
Start: 2023-11-28

## 2023-11-28 RX ORDER — EMPAGLIFLOZIN 10 MG/1
10 TABLET, FILM COATED ORAL DAILY
Qty: 90 TABLET | Refills: 0 | Status: SHIPPED | OUTPATIENT
Start: 2023-11-28

## 2023-11-28 RX ORDER — CARVEDILOL 12.5 MG/1
TABLET ORAL
Qty: 60 TABLET | Refills: 1 | Status: SHIPPED | OUTPATIENT
Start: 2023-11-28 | End: 2023-11-28 | Stop reason: SDUPTHER

## 2023-12-01 RX ORDER — MICONAZOLE NITRATE 4 %
1 CREAM WITH PREFILLED APPLICATOR VAGINAL DAILY
Qty: 1 EACH | Refills: 0 | Status: SHIPPED | OUTPATIENT
Start: 2023-12-01

## 2023-12-04 ENCOUNTER — TELEPHONE (OUTPATIENT)
Facility: CLINIC | Age: 53
End: 2023-12-04

## 2023-12-12 ENCOUNTER — TELEPHONE (OUTPATIENT)
Facility: CLINIC | Age: 53
End: 2023-12-12

## 2023-12-12 NOTE — TELEPHONE ENCOUNTER
----- Message from Shannon Castillo sent at 12/12/2023  9:59 AM EST -----  Regarding: Referral   Contact: [phone]  Good morning. I'm trying to get an appointment with a pain specialist but they require a referral. Could you please fax a referral to them please? It's for [facility] located at [facility] [address], [place]   They need it faxed to #[phone]. Thank you.

## 2023-12-14 NOTE — TELEPHONE ENCOUNTER
Sent pt a message via [employer] to find out what body part she is going to see pain specialist about.  MICHELLE CHRISTIANSON MA

## 2023-12-17 DIAGNOSIS — G47.00 INSOMNIA, UNSPECIFIED TYPE: ICD-10-CM

## 2023-12-17 RX ORDER — ESZOPICLONE 2 MG/1
TABLET, FILM COATED ORAL
Qty: 30 TABLET | Refills: 0 | Status: SHIPPED | OUTPATIENT
Start: 2023-12-17 | End: 2024-03-16

## 2023-12-17 RX ORDER — ESZOPICLONE 2 MG/1
TABLET, FILM COATED ORAL
Qty: 30 TABLET | Refills: 0 | OUTPATIENT
Start: 2023-12-17 | End: 2024-03-16

## 2023-12-18 DIAGNOSIS — G89.29 CHRONIC BILATERAL LOW BACK PAIN, UNSPECIFIED WHETHER SCIATICA PRESENT: Primary | ICD-10-CM

## 2023-12-18 DIAGNOSIS — M54.50 CHRONIC BILATERAL LOW BACK PAIN, UNSPECIFIED WHETHER SCIATICA PRESENT: Primary | ICD-10-CM

## 2023-12-19 RX ORDER — FLUCONAZOLE 200 MG/1
200 TABLET ORAL DAILY
COMMUNITY
End: 2023-12-21 | Stop reason: SDUPTHER

## 2023-12-19 NOTE — TELEPHONE ENCOUNTER
----- Message from Miya Castillo sent at 12/19/2023 12:33 AM EST -----  Regarding: Prescription issue  Contact: [phone]  Good evening. My insurance denied coverage for the Monistat prescription because it's OTC. It covered it the first time but it wouldn't cover it this time per [facility]. Could you please send over a prescription for Diflucan? I've taken it many times before with no issues. But with me I usually have to do a 3 day dose. I usually take 1 pill a day for 3 days. If you could do this as soon as possible I'll greatly appreciate it because the infection is getting painful. Thank you.

## 2023-12-21 RX ORDER — FLUCONAZOLE 150 MG/1
150 TABLET ORAL
Qty: 3 TABLET | Refills: 0 | Status: SHIPPED | OUTPATIENT
Start: 2023-12-21 | End: 2023-12-30

## 2023-12-28 ENCOUNTER — TELEPHONE (OUTPATIENT)
Facility: CLINIC | Age: 53
End: 2023-12-28

## 2023-12-28 RX ORDER — LEVOTHYROXINE SODIUM 88 UG/1
88 TABLET ORAL DAILY
Qty: 90 TABLET | Refills: 0 | Status: SHIPPED | OUTPATIENT
Start: 2023-12-28

## 2023-12-28 NOTE — TELEPHONE ENCOUNTER
----- Message from Miya Castillo sent at 12/28/2023  9:22 AM EST -----  Regarding: Cymbalta question   Contact: [phone]  Good morning. I used to take Cymbalta 60 mg BID when I was living in [place] 6 years ago to help with my fibromyalgia and arthritis pain. Do you think you could change my current prescription to this please? I still haven't received any calls about the referral from the [facility] yet also. Thank you

## 2023-12-28 NOTE — TELEPHONE ENCOUNTER
Good morning. I used to take Cymbalta 60 mg BID when I was living in [place] 6 years ago to help with my fibromyalgia and arthritis pain. Do you think you could change my current prescription to this please? I still haven't received any calls about the referral from the [facility] yet also. Thank you

## 2024-01-03 ENCOUNTER — TELEPHONE (OUTPATIENT)
Facility: CLINIC | Age: 54
End: 2024-01-03

## 2024-01-03 NOTE — TELEPHONE ENCOUNTER
Left detailed msg on VM that Referral has been faxed to [facility]. MICHELLE CHRISTIANSON MA

## 2024-01-03 NOTE — TELEPHONE ENCOUNTER
----- Message from Josselin Dodge MD sent at 1/1/2024 10:29 PM EST -----  Regarding: RE: Referral   Contact: [phone]  Referral is in the chart,can you fax to the requested number. Also add her new phone number  ----- Message -----  From: Conor Islas MD  Sent: 12/20/2023   9:17 PM EST  To: Josselin Dodge MD  Subject: FW: Referral                                       ----- Message -----  From: Miya Castillo  Sent: 12/19/2023   8:21 AM EST  To: #  Subject: Referral                                         Has the referral been sent yet? Also please note that my number has changed to #[phone]. Thank you.

## 2024-01-07 RX ORDER — RIZATRIPTAN BENZOATE 10 MG/1
TABLET ORAL
Qty: 28 TABLET | Refills: 1 | Status: SHIPPED | OUTPATIENT
Start: 2024-01-07

## 2024-01-11 RX ORDER — ONDANSETRON 4 MG/1
4 TABLET, FILM COATED ORAL EVERY 8 HOURS PRN
Qty: 30 TABLET | Refills: 0 | Status: SHIPPED | OUTPATIENT
Start: 2024-01-11

## 2024-01-11 RX ORDER — ONDANSETRON 4 MG/1
4 TABLET, FILM COATED ORAL EVERY 8 HOURS PRN
COMMUNITY
End: 2024-01-11 | Stop reason: SDUPTHER

## 2024-01-11 RX ORDER — FLUCONAZOLE 200 MG/1
200 TABLET ORAL DAILY
Qty: 3 TABLET | Refills: 1 | OUTPATIENT
Start: 2024-01-11

## 2024-01-12 DIAGNOSIS — G47.00 INSOMNIA, UNSPECIFIED TYPE: ICD-10-CM

## 2024-01-12 RX ORDER — ESZOPICLONE 2 MG/1
2 TABLET, FILM COATED ORAL NIGHTLY PRN
Qty: 30 TABLET | Refills: 1 | Status: SHIPPED | OUTPATIENT
Start: 2024-01-12 | End: 2024-03-12

## 2024-01-12 RX ORDER — CYCLOBENZAPRINE HCL 10 MG
10 TABLET ORAL 3 TIMES DAILY PRN
Qty: 45 TABLET | Refills: 0 | Status: SHIPPED | OUTPATIENT
Start: 2024-01-12

## 2024-01-15 ENCOUNTER — TELEPHONE (OUTPATIENT)
Facility: CLINIC | Age: 54
End: 2024-01-15

## 2024-01-15 DIAGNOSIS — G47.00 INSOMNIA, UNSPECIFIED TYPE: ICD-10-CM

## 2024-01-15 RX ORDER — ESZOPICLONE 2 MG/1
2 TABLET, FILM COATED ORAL NIGHTLY PRN
Qty: 30 TABLET | Refills: 1 | OUTPATIENT
Start: 2024-01-15 | End: 2024-03-15

## 2024-01-15 NOTE — TELEPHONE ENCOUNTER
Referral has been faxed to [facility]. Phone number has been updated. MICHELLE CHRISTIANSON MA

## 2024-01-15 NOTE — TELEPHONE ENCOUNTER
----- Message from Josselin Dodge MD sent at 1/12/2024  5:47 PM EST -----  Regarding: RE: Referral   Contact: [phone]  Referral is in the chart,can you fax to the requested number. Also add her new phone number    ----- Message -----  From: Conor Islas MD  Sent: 12/20/2023   9:17 PM EST  To: Josselin Dodge MD  Subject: FW: Referral                                       ----- Message -----  From: Miya Castillo  Sent: 12/19/2023   8:21 AM EST  To: #  Subject: Referral                                         Has the referral been sent yet? Also please note that my number has changed to #[phone]. Thank you.

## 2024-01-20 RX ORDER — CARVEDILOL 12.5 MG/1
12.5 TABLET ORAL 2 TIMES DAILY WITH MEALS
Qty: 60 TABLET | Refills: 1 | Status: SHIPPED | OUTPATIENT
Start: 2024-01-20

## 2024-01-28 RX ORDER — LEVOTHYROXINE SODIUM 88 UG/1
88 TABLET ORAL DAILY
Qty: 30 TABLET | Refills: 1 | Status: SHIPPED | OUTPATIENT
Start: 2024-01-28

## 2024-01-28 RX ORDER — CARVEDILOL 12.5 MG/1
12.5 TABLET ORAL 2 TIMES DAILY WITH MEALS
Qty: 60 TABLET | Refills: 1 | OUTPATIENT
Start: 2024-01-28

## 2024-01-29 RX ORDER — CARVEDILOL 12.5 MG/1
12.5 TABLET ORAL 2 TIMES DAILY WITH MEALS
Qty: 60 TABLET | Refills: 0 | Status: SHIPPED | OUTPATIENT
Start: 2024-01-29

## 2024-01-29 RX ORDER — DULOXETIN HYDROCHLORIDE 60 MG/1
60 CAPSULE, DELAYED RELEASE ORAL DAILY
Qty: 30 CAPSULE | Refills: 0 | Status: SHIPPED | OUTPATIENT
Start: 2024-01-29

## 2024-02-11 RX ORDER — PHENAZOPYRIDINE HYDROCHLORIDE 100 MG/1
TABLET, FILM COATED ORAL
Qty: 10 TABLET | Refills: 0 | Status: SHIPPED | OUTPATIENT
Start: 2024-02-11

## 2024-02-13 ENCOUNTER — HOSPITAL ENCOUNTER (EMERGENCY)
Facility: HOSPITAL | Age: 54
Discharge: HOME | End: 2024-02-13
Attending: FAMILY MEDICINE
Payer: MEDICAID

## 2024-02-13 ENCOUNTER — APPOINTMENT (OUTPATIENT)
Facility: HOSPITAL | Age: 54
End: 2024-02-13
Payer: MEDICAID

## 2024-02-13 VITALS
DIASTOLIC BLOOD PRESSURE: 80 MMHG | RESPIRATION RATE: 18 BRPM | BODY MASS INDEX: 43.4 KG/M2 | WEIGHT: 293 LBS | HEIGHT: 69 IN | OXYGEN SATURATION: 97 % | TEMPERATURE: 97.7 F | SYSTOLIC BLOOD PRESSURE: 126 MMHG | HEART RATE: 86 BPM

## 2024-02-13 DIAGNOSIS — K57.32 DIVERTICULITIS OF COLON: Primary | ICD-10-CM

## 2024-02-13 LAB
ALBUMIN SERPL-MCNC: 3 G/DL (ref 3.5–5)
ALBUMIN/GLOB SERPL: 0.7 (ref 1.1–2.2)
ALP SERPL-CCNC: 87 U/L (ref 45–117)
ALT SERPL-CCNC: 21 U/L (ref 12–78)
ANION GAP SERPL CALC-SCNC: 9 MMOL/L (ref 5–15)
AST SERPL W P-5'-P-CCNC: 13 U/L (ref 15–37)
BASOPHILS # BLD: 0 K/UL (ref 0–0.1)
BASOPHILS NFR BLD: 0 % (ref 0–1)
BILIRUB SERPL-MCNC: 0.5 MG/DL (ref 0.2–1)
BUN SERPL-MCNC: 18 MG/DL (ref 6–20)
BUN/CREAT SERPL: 14 (ref 12–20)
CA-I BLD-MCNC: 8.6 MG/DL (ref 8.5–10.1)
CHLORIDE SERPL-SCNC: 103 MMOL/L (ref 97–108)
CO2 SERPL-SCNC: 26 MMOL/L (ref 21–32)
CREAT SERPL-MCNC: 1.28 MG/DL (ref 0.55–1.02)
DIFFERENTIAL METHOD BLD: ABNORMAL
EOSINOPHIL # BLD: 0.3 K/UL (ref 0–0.4)
EOSINOPHIL NFR BLD: 4 % (ref 0–7)
ERYTHROCYTE [DISTWIDTH] IN BLOOD BY AUTOMATED COUNT: 14.1 % (ref 11.5–14.5)
GLOBULIN SER CALC-MCNC: 4.2 G/DL (ref 2–4)
GLUCOSE SERPL-MCNC: 158 MG/DL (ref 65–100)
HCT VFR BLD AUTO: 44.1 % (ref 35–47)
HGB BLD-MCNC: 13.5 G/DL (ref 11.5–16)
IMM GRANULOCYTES # BLD AUTO: 0 K/UL (ref 0–0.04)
IMM GRANULOCYTES NFR BLD AUTO: 0 % (ref 0–0.5)
LIPASE SERPL-CCNC: 31 U/L (ref 13–75)
LYMPHOCYTES # BLD: 2.2 K/UL (ref 0.8–3.5)
LYMPHOCYTES NFR BLD: 25 % (ref 12–49)
MCH RBC QN AUTO: 28.6 PG (ref 26–34)
MCHC RBC AUTO-ENTMCNC: 30.6 G/DL (ref 30–36.5)
MCV RBC AUTO: 93.4 FL (ref 80–99)
MONOCYTES # BLD: 0.3 K/UL (ref 0–1)
MONOCYTES NFR BLD: 4 % (ref 5–13)
NEUTS SEG # BLD: 6 K/UL (ref 1.8–8)
NEUTS SEG NFR BLD: 67 % (ref 32–75)
PLATELET # BLD AUTO: 291 K/UL (ref 150–400)
PMV BLD AUTO: 9.7 FL (ref 8.9–12.9)
POTASSIUM SERPL-SCNC: 4 MMOL/L (ref 3.5–5.1)
PROT SERPL-MCNC: 7.2 G/DL (ref 6.4–8.2)
RBC # BLD AUTO: 4.72 M/UL (ref 3.8–5.2)
SODIUM SERPL-SCNC: 138 MMOL/L (ref 136–145)
WBC # BLD AUTO: 8.9 K/UL (ref 3.6–11)

## 2024-02-13 PROCEDURE — 80053 COMPREHEN METABOLIC PANEL: CPT

## 2024-02-13 PROCEDURE — 96374 THER/PROPH/DIAG INJ IV PUSH: CPT

## 2024-02-13 PROCEDURE — 74177 CT ABD & PELVIS W/CONTRAST: CPT

## 2024-02-13 PROCEDURE — 36415 COLL VENOUS BLD VENIPUNCTURE: CPT

## 2024-02-13 PROCEDURE — 83690 ASSAY OF LIPASE: CPT

## 2024-02-13 PROCEDURE — 99285 EMERGENCY DEPT VISIT HI MDM: CPT

## 2024-02-13 PROCEDURE — 85025 COMPLETE CBC W/AUTO DIFF WBC: CPT

## 2024-02-13 PROCEDURE — 6360000004 HC RX CONTRAST MEDICATION: Performed by: FAMILY MEDICINE

## 2024-02-13 PROCEDURE — 6370000000 HC RX 637 (ALT 250 FOR IP): Performed by: FAMILY MEDICINE

## 2024-02-13 PROCEDURE — 6360000002 HC RX W HCPCS: Performed by: FAMILY MEDICINE

## 2024-02-13 RX ORDER — METRONIDAZOLE 500 MG/1
500 TABLET ORAL 3 TIMES DAILY
Qty: 30 TABLET | Refills: 0 | Status: SHIPPED | OUTPATIENT
Start: 2024-02-13 | End: 2024-02-23

## 2024-02-13 RX ORDER — METRONIDAZOLE 500 MG/1
500 TABLET ORAL
Status: COMPLETED | OUTPATIENT
Start: 2024-02-13 | End: 2024-02-13

## 2024-02-13 RX ORDER — CIPROFLOXACIN 500 MG/1
500 TABLET, FILM COATED ORAL
Status: COMPLETED | OUTPATIENT
Start: 2024-02-13 | End: 2024-02-13

## 2024-02-13 RX ORDER — KETOROLAC TROMETHAMINE 30 MG/ML
30 INJECTION, SOLUTION INTRAMUSCULAR; INTRAVENOUS
Status: COMPLETED | OUTPATIENT
Start: 2024-02-13 | End: 2024-02-13

## 2024-02-13 RX ORDER — CIPROFLOXACIN 500 MG/1
500 TABLET, FILM COATED ORAL 2 TIMES DAILY
Qty: 20 TABLET | Refills: 0 | Status: SHIPPED | OUTPATIENT
Start: 2024-02-13 | End: 2024-02-23

## 2024-02-13 RX ADMIN — KETOROLAC TROMETHAMINE 30 MG: 30 INJECTION, SOLUTION INTRAMUSCULAR; INTRAVENOUS at 03:59

## 2024-02-13 RX ADMIN — CIPROFLOXACIN HYDROCHLORIDE 500 MG: 500 TABLET, FILM COATED ORAL at 05:50

## 2024-02-13 RX ADMIN — METRONIDAZOLE 500 MG: 500 TABLET ORAL at 05:50

## 2024-02-13 RX ADMIN — IOPAMIDOL 100 ML: 755 INJECTION, SOLUTION INTRAVENOUS at 04:54

## 2024-02-13 ASSESSMENT — PAIN - FUNCTIONAL ASSESSMENT
PAIN_FUNCTIONAL_ASSESSMENT: 0-10
PAIN_FUNCTIONAL_ASSESSMENT: ACTIVITIES ARE NOT PREVENTED
PAIN_FUNCTIONAL_ASSESSMENT: ACTIVITIES ARE NOT PREVENTED

## 2024-02-13 ASSESSMENT — PAIN DESCRIPTION - PAIN TYPE
TYPE: ACUTE PAIN
TYPE: ACUTE PAIN

## 2024-02-13 ASSESSMENT — PAIN DESCRIPTION - ONSET: ONSET: ON-GOING

## 2024-02-13 ASSESSMENT — LIFESTYLE VARIABLES
HOW MANY STANDARD DRINKS CONTAINING ALCOHOL DO YOU HAVE ON A TYPICAL DAY: PATIENT DOES NOT DRINK
HOW OFTEN DO YOU HAVE A DRINK CONTAINING ALCOHOL: NEVER

## 2024-02-13 ASSESSMENT — PAIN DESCRIPTION - ORIENTATION
ORIENTATION: LEFT;LOWER
ORIENTATION: LOWER;LEFT
ORIENTATION: LEFT;LOWER

## 2024-02-13 ASSESSMENT — PAIN DESCRIPTION - LOCATION
LOCATION: ABDOMEN

## 2024-02-13 ASSESSMENT — PAIN DESCRIPTION - FREQUENCY: FREQUENCY: INTERMITTENT

## 2024-02-13 ASSESSMENT — PAIN DESCRIPTION - DESCRIPTORS
DESCRIPTORS: SHARP
DESCRIPTORS: SHARP
DESCRIPTORS: PRESSURE

## 2024-02-13 ASSESSMENT — PAIN SCALES - GENERAL
PAINLEVEL_OUTOF10: 8
PAINLEVEL_OUTOF10: 9
PAINLEVEL_OUTOF10: 9

## 2024-02-13 NOTE — ED TRIAGE NOTES
Patient reports constipation, no BM for 3 days, passing gas. Has taken Miralax, stool softner, Dulcolax without relief. History of IBS. No fever, no vomiting.

## 2024-02-13 NOTE — DISCHARGE INSTRUCTIONS
Thank you!  Thank you for allowing me to care for you in the emergency department. It is my goal to provide you with excellent care.  Please fill out the survey that will come to you by mail or email since we listen to your feedback!     Below you will find a list of your tests from today's visit.  Should you have any questions, please do not hesitate to call the emergency department.    Labs  Recent Results (from the past 12 hour(s))   CBC with Auto Differential    Collection Time: 02/13/24  3:45 AM   Result Value Ref Range    WBC 8.9 3.6 - 11.0 K/uL    RBC 4.72 3.80 - 5.20 M/uL    Hemoglobin 13.5 11.5 - 16.0 g/dL    Hematocrit 44.1 35.0 - 47.0 %    MCV 93.4 80.0 - 99.0 FL    MCH 28.6 26.0 - 34.0 PG    MCHC 30.6 30.0 - 36.5 g/dL    RDW 14.1 11.5 - 14.5 %    Platelets 291 150 - 400 K/uL    MPV 9.7 8.9 - 12.9 FL    Neutrophils % 67 32 - 75 %    Lymphocytes % 25 12 - 49 %    Monocytes % 4 (L) 5 - 13 %    Eosinophils % 4 0 - 7 %    Basophils % 0 0 - 1 %    Immature Granulocytes 0 0.0 - 0.5 %    Neutrophils Absolute 6.0 1.8 - 8.0 K/UL    Lymphocytes Absolute 2.2 0.8 - 3.5 K/UL    Monocytes Absolute 0.3 0.0 - 1.0 K/UL    Eosinophils Absolute 0.3 0.0 - 0.4 K/UL    Basophils Absolute 0.0 0.0 - 0.1 K/UL    Absolute Immature Granulocyte 0.0 0.00 - 0.04 K/UL    Differential Type AUTOMATED     CMP    Collection Time: 02/13/24  3:45 AM   Result Value Ref Range    Sodium 138 136 - 145 mmol/L    Potassium 4.0 3.5 - 5.1 mmol/L    Chloride 103 97 - 108 mmol/L    CO2 26 21 - 32 mmol/L    Anion Gap 9 5 - 15 mmol/L    Glucose 158 (H) 65 - 100 mg/dL    BUN 18 6 - 20 mg/dL    Creatinine 1.28 (H) 0.55 - 1.02 mg/dL    Bun/Cre Ratio 14 12 - 20      Est, Glom Filt Rate 51 (L) >60 ml/min/1.73m2    Calcium 8.6 8.5 - 10.1 mg/dL    Total Bilirubin 0.5 0.2 - 1.0 mg/dL    AST 13 (L) 15 - 37 U/L    ALT 21 12 - 78 U/L    Alk Phosphatase 87 45 - 117 U/L    Total Protein 7.2 6.4 - 8.2 g/dL    Albumin 3.0 (L) 3.5 - 5.0 g/dL    Globulin 4.2 (H)

## 2024-02-14 ENCOUNTER — TELEPHONE (OUTPATIENT)
Facility: CLINIC | Age: 54
End: 2024-02-14

## 2024-02-14 NOTE — TELEPHONE ENCOUNTER
Patient has diverticulitis, she was taken out of work by the ER for only 2 days. She needs an excuse until 02/19.

## 2024-02-15 ENCOUNTER — OFFICE VISIT (OUTPATIENT)
Facility: CLINIC | Age: 54
End: 2024-02-15
Payer: MEDICAID

## 2024-02-15 VITALS
HEIGHT: 69 IN | SYSTOLIC BLOOD PRESSURE: 128 MMHG | OXYGEN SATURATION: 98 % | HEART RATE: 95 BPM | WEIGHT: 293 LBS | BODY MASS INDEX: 43.4 KG/M2 | DIASTOLIC BLOOD PRESSURE: 100 MMHG | TEMPERATURE: 98.8 F

## 2024-02-15 DIAGNOSIS — K59.00 CONSTIPATION, UNSPECIFIED CONSTIPATION TYPE: ICD-10-CM

## 2024-02-15 DIAGNOSIS — R79.89 ELEVATED SERUM CREATININE: ICD-10-CM

## 2024-02-15 DIAGNOSIS — K57.32 DIVERTICULITIS OF SIGMOID COLON: Primary | ICD-10-CM

## 2024-02-15 DIAGNOSIS — I10 ESSENTIAL HYPERTENSION, BENIGN: ICD-10-CM

## 2024-02-15 PROBLEM — J01.00 SINUSITIS, ACUTE MAXILLARY: Status: ACTIVE | Noted: 2017-03-14

## 2024-02-15 PROBLEM — M47.817 SPONDYLOSIS OF LUMBOSACRAL SPINE WITHOUT MYELOPATHY: Status: ACTIVE | Noted: 2022-12-08

## 2024-02-15 PROBLEM — M79.672 LEFT FOOT PAIN: Status: ACTIVE | Noted: 2019-02-18

## 2024-02-15 PROBLEM — K02.9 CARIES INVOLVING MULTIPLE SURFACES OF TOOTH: Status: ACTIVE | Noted: 2019-03-04

## 2024-02-15 PROBLEM — L03.90 CELLULITIS: Status: ACTIVE | Noted: 2019-06-30

## 2024-02-15 PROBLEM — J06.9 UPPER RESPIRATORY INFECTION: Status: ACTIVE | Noted: 2020-01-04

## 2024-02-15 PROCEDURE — 3080F DIAST BP >= 90 MM HG: CPT | Performed by: INTERNAL MEDICINE

## 2024-02-15 PROCEDURE — 3074F SYST BP LT 130 MM HG: CPT | Performed by: INTERNAL MEDICINE

## 2024-02-15 PROCEDURE — 99214 OFFICE O/P EST MOD 30 MIN: CPT | Performed by: INTERNAL MEDICINE

## 2024-02-15 NOTE — PROGRESS NOTES
No chief complaint on file.        1. \"Have you been to the ER, urgent care clinic since your last visit?  Hospitalized since your last visit?\"  [place]    2. \"Have you seen or consulted any other health care providers outside of the [facility] since your last visit?\" No     3. For patients aged 45-75: Has the patient had a colonoscopy / FIT/ Cologuard? Yes - Care Gap present. Most recent result on file      If the patient is female:    4. For patients aged 40-74: Has the patient had a mammogram within the past 2 years? Yes - Care Gap present. Most recent result on file      5. For patients aged 21-65: Has the patient had a pap smear? Yes - Care Gap present. Most recent result on file  
slightly high at 1.28, advised to continue fluid intake, avoid Aleve or Motrin  3. Constipation, unspecified constipation type  Comments:  Advised to continue stool softeners and to try glycerin suppositories/enema, if no response to come to ER for enema.  Will get GI consult  Orders:  -     Amb External Referral To Gastroenterology  4. Essential hypertension, benign  Comments:  Diastolic blood pressure is elevated, advised to continue low-sodium diet carvedilol and monitor blood pressure at home      Orders Placed This Encounter    Amb External Referral To Gastroenterology     Referral Priority:   Routine     Referral Type:   Eval and Treat     Referral Reason:   Specialty Services Required     Referred to Provider:   Jefe Vilchis MD     Requested Specialty:   Gastroenterology     Number of Visits Requested:   1        No follow-ups on file.     There are no Patient Instructions on file for this visit.     Health Maintenance Due   Topic Date Due    Hepatitis C screen  Never done    Diabetes screen  Never done    Lipids  Never done    Hepatitis B vaccine (3 of 3 - 19+ 3-dose series) 10/27/2021    Shingles vaccine (1 of 2) Never done    COVID-19 Vaccine (5 - 2023-24 season) 09/01/2023    Depression Monitoring  12/08/2023        Aspects of this note may have been generated using voice recognition software. Despite editing, there may be unrecognized errors.    An electronic signature was used to authenticate this note.  -- Josselin Dodge MD

## 2024-02-17 RX ORDER — ERGOCALCIFEROL 1.25 MG/1
CAPSULE ORAL
Qty: 12 CAPSULE | Refills: 0 | Status: SHIPPED | OUTPATIENT
Start: 2024-02-17

## 2024-02-20 RX ORDER — CYCLOBENZAPRINE HCL 10 MG
10 TABLET ORAL 3 TIMES DAILY PRN
Qty: 45 TABLET | Refills: 0 | Status: SHIPPED | OUTPATIENT
Start: 2024-02-20

## 2024-02-20 RX ORDER — ONDANSETRON 4 MG/1
TABLET, FILM COATED ORAL
Qty: 30 TABLET | Refills: 0 | Status: SHIPPED | OUTPATIENT
Start: 2024-02-20

## 2024-02-23 NOTE — ED PROVIDER NOTES
EMERGENCY DEPARTMENT HISTORY AND PHYSICAL EXAM      Date: 2/13/2024  Patient Name: Miya Castillo    History of Presenting Illness         History Provided By:     HPI: Miya Castillo, is a very pleasant 53 y.o. female presenting to the ED with a chief complaint of constipation.  States generalized abdominal discomfort as well as no bowel movement x 3 days.  Still passing flatus.  She took MiraLAX and stool softener as well as Dulcolax without improvement of symptoms no bowel movement.  No fever.  No urinary symptoms.  No pelvic pain    Denies any other symptoms at this time.    PCP: Josselin Dodge MD    No current facility-administered medications on file prior to encounter.     Current Outpatient Medications on File Prior to Encounter   Medication Sig Dispense Refill    esomeprazole (NEXIUM) 20 MG delayed release capsule Take 1 capsule by mouth daily 30 capsule 0    DULoxetine (CYMBALTA) 60 MG extended release capsule Take 1 capsule by mouth daily 30 capsule 0    carvedilol (COREG) 12.5 MG tablet Take 1 tablet by mouth 2 times daily (with meals) 60 tablet 0    levothyroxine (SYNTHROID) 88 MCG tablet take 1 tablet by mouth once daily 30 tablet 1    eszopiclone (LUNESTA) 2 MG TABS Take 1 tablet by mouth nightly as needed (Sleep) for up to 60 days. Max Daily Amount: 2 mg 30 tablet 1    rizatriptan (MAXALT) 10 MG tablet take 1 tablet by mouth ONCE AS NEEDED FOR MIGRAINE FOR UP TO 1 DOSE --MAY REPEAT IN 2 HRS IF NEEDED 28 tablet 1    JARDIANCE 10 MG tablet take 1 tablet by mouth once daily 90 tablet 0    furosemide (LASIX) 20 MG tablet Take 1 tablet by mouth daily 90 tablet 0    glycerin-hypromellose- (ARTIFICIAL TEARS) 0.2-0.2-1 % SOLN opthalmic solution Place 1 drop into both eyes 2 times daily 30 mL 3    valACYclovir (VALTREX) 500 MG tablet Take 1 tablet by mouth daily 90 tablet 1    lidocaine (XYLOCAINE) 5 % ointment APPLY TOPICALLY TO AFFECTED AREAS once daily if needed 35.44 g 1    nystatin

## 2024-02-24 RX ORDER — FUROSEMIDE 20 MG/1
20 TABLET ORAL DAILY
Qty: 90 TABLET | Refills: 0 | Status: SHIPPED | OUTPATIENT
Start: 2024-02-24

## 2024-02-24 RX ORDER — EMPAGLIFLOZIN 10 MG/1
10 TABLET, FILM COATED ORAL DAILY
Qty: 90 TABLET | Refills: 0 | Status: SHIPPED | OUTPATIENT
Start: 2024-02-24

## 2024-02-29 RX ORDER — DULOXETIN HYDROCHLORIDE 60 MG/1
60 CAPSULE, DELAYED RELEASE ORAL DAILY
Qty: 30 CAPSULE | Refills: 0 | Status: SHIPPED | OUTPATIENT
Start: 2024-02-29

## 2024-02-29 RX ORDER — CARVEDILOL 12.5 MG/1
12.5 TABLET ORAL 2 TIMES DAILY WITH MEALS
Qty: 60 TABLET | Refills: 0 | Status: SHIPPED | OUTPATIENT
Start: 2024-02-29

## 2024-03-14 LAB
25(OH)D3+25(OH)D2 SERPL-MCNC: 32.7 NG/ML (ref 30–100)
ALBUMIN SERPL-MCNC: 4.3 G/DL (ref 3.8–4.9)
ALBUMIN/GLOB SERPL: 1.4 {RATIO} (ref 1.2–2.2)
ALP SERPL-CCNC: 99 IU/L (ref 44–121)
ALT SERPL-CCNC: 21 IU/L (ref 0–32)
AST SERPL-CCNC: 19 IU/L (ref 0–40)
BILIRUB SERPL-MCNC: 0.5 MG/DL (ref 0–1.2)
BUN SERPL-MCNC: 24 MG/DL (ref 6–24)
BUN/CREAT SERPL: 18 (ref 9–23)
CALCIUM SERPL-MCNC: 9.6 MG/DL (ref 8.7–10.2)
CHLORIDE SERPL-SCNC: 100 MMOL/L (ref 96–106)
CHOLEST SERPL-MCNC: 203 MG/DL (ref 100–199)
CO2 SERPL-SCNC: 23 MMOL/L (ref 20–29)
CREAT SERPL-MCNC: 1.34 MG/DL (ref 0.57–1)
EGFRCR SERPLBLD CKD-EPI 2021: 48 ML/MIN/1.73
FSH SERPL-ACNC: 33.3 MIU/ML
GLOBULIN SER CALC-MCNC: 3 G/DL (ref 1.5–4.5)
GLUCOSE SERPL-MCNC: 107 MG/DL (ref 70–99)
HCV IGG SERPL QL IA: NON REACTIVE
HDLC SERPL-MCNC: 44 MG/DL
LDLC SERPL CALC-MCNC: 137 MG/DL (ref 0–99)
LH SERPL-ACNC: 26.5 MIU/ML
POTASSIUM SERPL-SCNC: 4.3 MMOL/L (ref 3.5–5.2)
PROT SERPL-MCNC: 7.3 G/DL (ref 6–8.5)
SODIUM SERPL-SCNC: 139 MMOL/L (ref 134–144)
TRIGL SERPL-MCNC: 124 MG/DL (ref 0–149)
VLDLC SERPL CALC-MCNC: 22 MG/DL (ref 5–40)

## 2024-03-20 DIAGNOSIS — I71.21 ANEURYSM OF ASCENDING AORTA WITHOUT RUPTURE (HCC): Primary | ICD-10-CM

## 2024-03-22 DIAGNOSIS — G47.00 INSOMNIA, UNSPECIFIED TYPE: ICD-10-CM

## 2024-03-23 RX ORDER — ESZOPICLONE 2 MG/1
TABLET, FILM COATED ORAL
Qty: 30 TABLET | Refills: 0 | Status: SHIPPED | OUTPATIENT
Start: 2024-03-23 | End: 2024-04-22

## 2024-03-27 RX ORDER — CYCLOBENZAPRINE HCL 10 MG
10 TABLET ORAL 3 TIMES DAILY PRN
Qty: 45 TABLET | Refills: 0 | OUTPATIENT
Start: 2024-03-27

## 2024-03-29 RX ORDER — DULOXETIN HYDROCHLORIDE 60 MG/1
60 CAPSULE, DELAYED RELEASE ORAL DAILY
Qty: 30 CAPSULE | Refills: 0 | OUTPATIENT
Start: 2024-03-29

## 2024-03-29 RX ORDER — FLUCONAZOLE 150 MG/1
150 TABLET ORAL
Qty: 3 TABLET | Refills: 0 | OUTPATIENT
Start: 2024-03-29 | End: 2024-04-07

## 2024-03-29 RX ORDER — LEVOTHYROXINE SODIUM 88 UG/1
88 TABLET ORAL DAILY
Qty: 30 TABLET | Refills: 1 | OUTPATIENT
Start: 2024-03-29

## 2024-03-29 RX ORDER — CARVEDILOL 12.5 MG/1
12.5 TABLET ORAL 2 TIMES DAILY WITH MEALS
Qty: 60 TABLET | Refills: 0 | OUTPATIENT
Start: 2024-03-29

## 2024-03-29 RX ORDER — CYCLOBENZAPRINE HCL 10 MG
10 TABLET ORAL 3 TIMES DAILY PRN
Qty: 45 TABLET | Refills: 0 | OUTPATIENT
Start: 2024-03-29

## 2024-04-05 ENCOUNTER — HOSPITAL ENCOUNTER (OUTPATIENT)
Facility: HOSPITAL | Age: 54
End: 2024-04-05
Payer: MEDICAID

## 2024-04-05 DIAGNOSIS — I71.21 ANEURYSM OF ASCENDING AORTA WITHOUT RUPTURE (HCC): ICD-10-CM

## 2024-04-05 DIAGNOSIS — Z12.31 SCREENING MAMMOGRAM FOR BREAST CANCER: ICD-10-CM

## 2024-04-05 LAB — CREAT BLD-MCNC: 1.3 MG/DL (ref 0.6–1.3)

## 2024-04-05 PROCEDURE — 77063 BREAST TOMOSYNTHESIS BI: CPT

## 2024-04-05 PROCEDURE — 6360000004 HC RX CONTRAST MEDICATION: Performed by: EMERGENCY MEDICINE

## 2024-04-05 PROCEDURE — 71275 CT ANGIOGRAPHY CHEST: CPT

## 2024-04-05 PROCEDURE — 82565 ASSAY OF CREATININE: CPT

## 2024-04-05 RX ADMIN — IOPAMIDOL 100 ML: 755 INJECTION, SOLUTION INTRAVENOUS at 15:20

## 2024-04-07 ENCOUNTER — PATIENT MESSAGE (OUTPATIENT)
Age: 54
End: 2024-04-07

## 2024-04-14 RX ORDER — CYCLOBENZAPRINE HCL 10 MG
10 TABLET ORAL 3 TIMES DAILY PRN
Qty: 45 TABLET | Refills: 0 | Status: SHIPPED | OUTPATIENT
Start: 2024-04-14

## 2024-04-14 RX ORDER — TOPIRAMATE 50 MG/1
50 TABLET, FILM COATED ORAL DAILY
Qty: 30 TABLET | Refills: 1 | Status: SHIPPED | OUTPATIENT
Start: 2024-04-14

## 2024-04-19 DIAGNOSIS — G47.00 INSOMNIA, UNSPECIFIED TYPE: ICD-10-CM

## 2024-04-19 RX ORDER — DULOXETIN HYDROCHLORIDE 60 MG/1
60 CAPSULE, DELAYED RELEASE ORAL DAILY
Qty: 30 CAPSULE | Refills: 0 | Status: SHIPPED | OUTPATIENT
Start: 2024-04-19

## 2024-04-20 RX ORDER — ESZOPICLONE 2 MG/1
TABLET, FILM COATED ORAL
Qty: 30 TABLET | Refills: 0 | Status: SHIPPED | OUTPATIENT
Start: 2024-04-20 | End: 2024-05-20

## 2024-04-24 RX ORDER — DULOXETIN HYDROCHLORIDE 60 MG/1
60 CAPSULE, DELAYED RELEASE ORAL DAILY
Qty: 30 CAPSULE | Refills: 0 | OUTPATIENT
Start: 2024-04-24

## 2024-04-24 RX ORDER — CYCLOBENZAPRINE HCL 10 MG
10 TABLET ORAL 3 TIMES DAILY PRN
Qty: 45 TABLET | Refills: 0 | Status: SHIPPED | OUTPATIENT
Start: 2024-04-24

## 2024-04-24 RX ORDER — CARVEDILOL 12.5 MG/1
12.5 TABLET ORAL 2 TIMES DAILY WITH MEALS
Qty: 60 TABLET | Refills: 0 | Status: SHIPPED | OUTPATIENT
Start: 2024-04-24

## 2024-04-26 DIAGNOSIS — I71.21 ANEURYSM OF ASCENDING AORTA WITHOUT RUPTURE (HCC): Primary | ICD-10-CM

## 2024-05-07 RX ORDER — LIDOCAINE 50 MG/G
OINTMENT TOPICAL
Qty: 35.44 G | Refills: 1 | Status: SHIPPED | OUTPATIENT
Start: 2024-05-07

## 2024-05-07 RX ORDER — FLUCONAZOLE 150 MG/1
150 TABLET ORAL
Qty: 3 TABLET | Refills: 0 | Status: SHIPPED | OUTPATIENT
Start: 2024-05-07 | End: 2024-05-08 | Stop reason: SDUPTHER

## 2024-05-08 DIAGNOSIS — R73.09 ELEVATED GLUCOSE LEVEL: ICD-10-CM

## 2024-05-08 RX ORDER — FLUCONAZOLE 150 MG/1
150 TABLET ORAL ONCE
Qty: 1 TABLET | Refills: 0 | Status: SHIPPED | OUTPATIENT
Start: 2024-05-08 | End: 2024-05-08

## 2024-05-16 RX ORDER — ERGOCALCIFEROL 1.25 MG/1
CAPSULE ORAL
Qty: 12 CAPSULE | Refills: 0 | Status: SHIPPED | OUTPATIENT
Start: 2024-05-16

## 2024-05-17 DIAGNOSIS — G47.00 INSOMNIA, UNSPECIFIED TYPE: ICD-10-CM

## 2024-05-19 RX ORDER — DULOXETIN HYDROCHLORIDE 60 MG/1
60 CAPSULE, DELAYED RELEASE ORAL DAILY
Qty: 30 CAPSULE | Refills: 0 | Status: SHIPPED | OUTPATIENT
Start: 2024-05-19

## 2024-05-19 RX ORDER — ESZOPICLONE 2 MG/1
TABLET, FILM COATED ORAL
Qty: 30 TABLET | OUTPATIENT
Start: 2024-05-19

## 2024-05-19 RX ORDER — ESZOPICLONE 2 MG/1
TABLET, FILM COATED ORAL
Qty: 30 TABLET | Refills: 0 | Status: SHIPPED | OUTPATIENT
Start: 2024-05-19 | End: 2024-06-19

## 2024-05-25 RX ORDER — ALBUTEROL SULFATE 90 UG/1
2 AEROSOL, METERED RESPIRATORY (INHALATION) 4 TIMES DAILY PRN
Qty: 18 G | Refills: 0 | Status: SHIPPED | OUTPATIENT
Start: 2024-05-25

## 2024-05-25 RX ORDER — DULOXETIN HYDROCHLORIDE 60 MG/1
60 CAPSULE, DELAYED RELEASE ORAL DAILY
Qty: 30 CAPSULE | Refills: 0 | Status: SHIPPED | OUTPATIENT
Start: 2024-05-25

## 2024-05-27 RX ORDER — EMPAGLIFLOZIN 10 MG/1
10 TABLET, FILM COATED ORAL DAILY
Qty: 30 TABLET | Refills: 0 | Status: SHIPPED | OUTPATIENT
Start: 2024-05-27

## 2024-05-27 RX ORDER — FUROSEMIDE 20 MG/1
20 TABLET ORAL DAILY
Qty: 30 TABLET | Refills: 0 | Status: SHIPPED | OUTPATIENT
Start: 2024-05-27

## 2024-06-01 RX ORDER — ONDANSETRON 4 MG/1
TABLET, FILM COATED ORAL
Qty: 30 TABLET | Refills: 0 | Status: SHIPPED | OUTPATIENT
Start: 2024-06-01

## 2024-06-01 RX ORDER — LEVOTHYROXINE SODIUM 88 UG/1
88 TABLET ORAL DAILY
Qty: 30 TABLET | Refills: 1 | Status: SHIPPED | OUTPATIENT
Start: 2024-06-01

## 2024-06-06 RX ORDER — LEVOTHYROXINE SODIUM 88 UG/1
88 TABLET ORAL DAILY
Qty: 30 TABLET | Refills: 1 | OUTPATIENT
Start: 2024-06-06

## 2024-06-12 ENCOUNTER — PATIENT MESSAGE (OUTPATIENT)
Facility: CLINIC | Age: 54
End: 2024-06-12

## 2024-06-13 RX ORDER — SEMAGLUTIDE 0.68 MG/ML
0.5 INJECTION, SOLUTION SUBCUTANEOUS
Qty: 3 ML | Refills: 0 | Status: SHIPPED | OUTPATIENT
Start: 2024-06-13

## 2024-06-13 NOTE — TELEPHONE ENCOUNTER
From: Miya Castillo  To: Dr. Josselin Dodge  Sent: 6/12/2024 2:22 PM EDT  Subject: Ozempic     You had prescribed me a prescription months ago for Ozempic but my insurance wouldn't cover it. But im now going to pay for it out of pocket and I know how much it costs. Could you please write a prescription for the starting dose and give me 2 boxes at a time with refills? Send to [facility]. I appreciate it a lot Thank you.

## 2024-06-17 RX ORDER — CARVEDILOL 12.5 MG/1
12.5 TABLET ORAL 2 TIMES DAILY WITH MEALS
Qty: 60 TABLET | Refills: 0 | Status: SHIPPED | OUTPATIENT
Start: 2024-06-17

## 2024-06-17 RX ORDER — TOPIRAMATE 50 MG/1
50 TABLET, FILM COATED ORAL DAILY
Qty: 30 TABLET | Refills: 0 | Status: SHIPPED | OUTPATIENT
Start: 2024-06-17

## 2024-06-18 DIAGNOSIS — G47.00 INSOMNIA, UNSPECIFIED TYPE: ICD-10-CM

## 2024-06-19 DIAGNOSIS — G47.00 INSOMNIA, UNSPECIFIED TYPE: ICD-10-CM

## 2024-06-20 RX ORDER — CYCLOBENZAPRINE HCL 10 MG
10 TABLET ORAL 3 TIMES DAILY PRN
Qty: 45 TABLET | Refills: 0 | Status: SHIPPED | OUTPATIENT
Start: 2024-06-20

## 2024-06-20 RX ORDER — ESZOPICLONE 2 MG/1
TABLET, FILM COATED ORAL
Qty: 30 TABLET | Refills: 0 | OUTPATIENT
Start: 2024-06-20 | End: 2024-07-20

## 2024-06-20 RX ORDER — ESZOPICLONE 2 MG/1
TABLET, FILM COATED ORAL
Qty: 30 TABLET | Refills: 0 | Status: SHIPPED | OUTPATIENT
Start: 2024-06-20 | End: 2024-07-20

## 2024-06-27 DIAGNOSIS — Z56.89 WORK-RELATED CONDITION: Primary | ICD-10-CM

## 2024-06-28 RX ORDER — ONDANSETRON 4 MG/1
TABLET, FILM COATED ORAL
Qty: 30 TABLET | Refills: 0 | Status: SHIPPED | OUTPATIENT
Start: 2024-06-28

## 2024-07-05 RX ORDER — FUROSEMIDE 20 MG/1
20 TABLET ORAL DAILY
Qty: 30 TABLET | Refills: 0 | Status: SHIPPED | OUTPATIENT
Start: 2024-07-05

## 2024-07-05 RX ORDER — EMPAGLIFLOZIN 10 MG/1
10 TABLET, FILM COATED ORAL DAILY
Qty: 30 TABLET | Refills: 0 | Status: SHIPPED | OUTPATIENT
Start: 2024-07-05

## 2024-07-09 RX ORDER — FLUTICASONE PROPIONATE 50 MCG
2 SPRAY, SUSPENSION (ML) NASAL DAILY
Qty: 16 G | Refills: 0 | Status: SHIPPED | OUTPATIENT
Start: 2024-07-09 | End: 2024-07-10 | Stop reason: SDUPTHER

## 2024-07-10 RX ORDER — FLUTICASONE PROPIONATE 50 MCG
2 SPRAY, SUSPENSION (ML) NASAL DAILY
Qty: 16 G | Refills: 0 | Status: SHIPPED | OUTPATIENT
Start: 2024-07-10

## 2024-07-16 ENCOUNTER — OFFICE VISIT (OUTPATIENT)
Facility: CLINIC | Age: 54
End: 2024-07-16
Payer: MEDICAID

## 2024-07-16 VITALS
WEIGHT: 293 LBS | OXYGEN SATURATION: 98 % | SYSTOLIC BLOOD PRESSURE: 101 MMHG | BODY MASS INDEX: 43.4 KG/M2 | HEART RATE: 60 BPM | DIASTOLIC BLOOD PRESSURE: 73 MMHG | HEIGHT: 69 IN | TEMPERATURE: 98 F

## 2024-07-16 DIAGNOSIS — I10 ESSENTIAL HYPERTENSION, BENIGN: ICD-10-CM

## 2024-07-16 DIAGNOSIS — N89.8 VAGINAL IRRITATION: ICD-10-CM

## 2024-07-16 DIAGNOSIS — R79.89 ELEVATED SERUM CREATININE: ICD-10-CM

## 2024-07-16 DIAGNOSIS — I10 ESSENTIAL HYPERTENSION, BENIGN: Primary | ICD-10-CM

## 2024-07-16 DIAGNOSIS — G43.009 MIGRAINE WITHOUT AURA AND WITHOUT STATUS MIGRAINOSUS, NOT INTRACTABLE: ICD-10-CM

## 2024-07-16 DIAGNOSIS — R73.09 ELEVATED GLUCOSE LEVEL: ICD-10-CM

## 2024-07-16 DIAGNOSIS — E66.01 MORBID OBESITY WITH BMI OF 45.0-49.9, ADULT (HCC): ICD-10-CM

## 2024-07-16 DIAGNOSIS — E78.00 HYPERCHOLESTEROLEMIA: ICD-10-CM

## 2024-07-16 DIAGNOSIS — J43.2 CENTRILOBULAR EMPHYSEMA (HCC): ICD-10-CM

## 2024-07-16 DIAGNOSIS — E03.9 HYPOTHYROIDISM, UNSPECIFIED TYPE: ICD-10-CM

## 2024-07-16 DIAGNOSIS — I71.21 ANEURYSM OF ASCENDING AORTA WITHOUT RUPTURE (HCC): ICD-10-CM

## 2024-07-16 DIAGNOSIS — R06.83 SNORING: ICD-10-CM

## 2024-07-16 PROCEDURE — G8417 CALC BMI ABV UP PARAM F/U: HCPCS | Performed by: INTERNAL MEDICINE

## 2024-07-16 PROCEDURE — 3023F SPIROM DOC REV: CPT | Performed by: INTERNAL MEDICINE

## 2024-07-16 PROCEDURE — 3074F SYST BP LT 130 MM HG: CPT | Performed by: INTERNAL MEDICINE

## 2024-07-16 PROCEDURE — G8427 DOCREV CUR MEDS BY ELIG CLIN: HCPCS | Performed by: INTERNAL MEDICINE

## 2024-07-16 PROCEDURE — 3017F COLORECTAL CA SCREEN DOC REV: CPT | Performed by: INTERNAL MEDICINE

## 2024-07-16 PROCEDURE — 99215 OFFICE O/P EST HI 40 MIN: CPT | Performed by: INTERNAL MEDICINE

## 2024-07-16 PROCEDURE — 3078F DIAST BP <80 MM HG: CPT | Performed by: INTERNAL MEDICINE

## 2024-07-16 PROCEDURE — 1036F TOBACCO NON-USER: CPT | Performed by: INTERNAL MEDICINE

## 2024-07-16 RX ORDER — SEMAGLUTIDE 1.34 MG/ML
0.5 INJECTION, SOLUTION SUBCUTANEOUS
Qty: 1 ADJUSTABLE DOSE PRE-FILLED PEN SYRINGE | Refills: 1 | Status: SHIPPED | OUTPATIENT
Start: 2024-07-16

## 2024-07-16 RX ORDER — FLUCONAZOLE 150 MG/1
150 TABLET ORAL ONCE
Qty: 1 TABLET | Refills: 0 | Status: SHIPPED | OUTPATIENT
Start: 2024-07-16 | End: 2024-07-16

## 2024-07-16 ASSESSMENT — ENCOUNTER SYMPTOMS
ABDOMINAL PAIN: 0
COUGH: 0
NAUSEA: 0
SHORTNESS OF BREATH: 0
VOMITING: 0
DIARRHEA: 0

## 2024-07-16 NOTE — PROGRESS NOTES
.  Chief Complaint   Patient presents with    Discuss Labs    Follow-up Chronic Condition    Hypertension     .  \"Have you been to the ER, urgent care clinic since your last visit?  Hospitalized since your last visit?\"    NO    “Have you seen or consulted any other health care providers outside of [facility] since your last visit?”    NO      ./73 (Site: Right Upper Arm, Position: Sitting, Cuff Size: Large Adult)   Pulse 60   Temp 98 °F (36.7 °C) (Oral)   Ht 1.753 m (5' 9\")   Wt (!) 147.4 kg (325 lb)   SpO2 98%   BMI 47.99 kg/m²         Click Here for Release of Records Request   
  Psychiatric/Behavioral:  The patient is not nervous/anxious.        /73 (Site: Right Upper Arm, Position: Sitting, Cuff Size: Large Adult)   Pulse 60   Temp 98 °F (36.7 °C) (Oral)   Ht 1.753 m (5' 9\")   Wt (!) 147.4 kg (325 lb)   SpO2 98%   BMI 47.99 kg/m²      Physical Exam  Constitutional:       Appearance: Young obese patient.   HENT:      Head: Normocephalic and atraumatic.      Right Ear: External ear normal.      Left Ear: External ear normal.      Nose: Nose normal.      Mouth/Throat:      Mouth: Mucous membranes are moist.   Eyes:      Extraocular Movements: Extraocular movements intact.      Pupils: Pupils are equal, round, and reactive to light.   Cardiovascular:      Rate and Rhythm: Normal rate and regular rhythm.   Pulmonary:      Effort: Pulmonary effort is normal.      Breath sounds: Normal breath sounds.   Abdominal:      Palpations: Abdomen is soft.      Tenderness: There is no abdominal tenderness.   Musculoskeletal:      Cervical back: Normal range of motion and neck supple.      Right lower leg: No edema.      Left lower leg: No edema.   Skin:     General: Skin is warm and dry.   Neurological:      General: No focal deficit present.      Mental Status: She   is alert and oriented to person, place, and time.   Psychiatric:         Mood and Affect: Mood normal.    ASSESSMENT/PLAN:  Below is the assessment and plan developed based on review of pertinent history, physical exam, labs, studies, and medications.  Assessment & Plan    Her potassium, calcium, and protein levels are all within normal ranges. Her cholesterol levels are slightly elevated. Her vitamin D levels are borderline. Hepatitis C is nonreactive. A mammogram conducted on 04/08/2024 was normal. A kidney ultrasound conducted in 2022 was normal. A prescription for Ozempic 0.5 mg to be taken every 7 days was provided. She was advised to consult her neurologist for her headache and back pain. Labs for A1c, thyroid, and liver 
Opt out

## 2024-07-17 RX ORDER — FLUCONAZOLE 150 MG/1
TABLET ORAL
Qty: 1 TABLET | Refills: 0 | OUTPATIENT
Start: 2024-07-17

## 2024-07-19 LAB
ALBUMIN SERPL-MCNC: 4.4 G/DL (ref 3.8–4.9)
ALP SERPL-CCNC: 89 IU/L (ref 44–121)
ALT SERPL-CCNC: 28 IU/L (ref 0–32)
AST SERPL-CCNC: 23 IU/L (ref 0–40)
BILIRUB SERPL-MCNC: 0.5 MG/DL (ref 0–1.2)
BUN SERPL-MCNC: 13 MG/DL (ref 6–24)
BUN/CREAT SERPL: 12 (ref 9–23)
CALCIUM SERPL-MCNC: 9.7 MG/DL (ref 8.7–10.2)
CHLORIDE SERPL-SCNC: 104 MMOL/L (ref 96–106)
CO2 SERPL-SCNC: 23 MMOL/L (ref 20–29)
CREAT SERPL-MCNC: 1.06 MG/DL (ref 0.57–1)
EGFRCR SERPLBLD CKD-EPI 2021: 64 ML/MIN/1.73
GLOBULIN SER CALC-MCNC: 2.7 G/DL (ref 1.5–4.5)
GLUCOSE SERPL-MCNC: 103 MG/DL (ref 70–99)
HBA1C MFR BLD: 5.5 % (ref 4.8–5.6)
POTASSIUM SERPL-SCNC: 4.9 MMOL/L (ref 3.5–5.2)
PROT SERPL-MCNC: 7.1 G/DL (ref 6–8.5)
SODIUM SERPL-SCNC: 141 MMOL/L (ref 134–144)
TSH SERPL DL<=0.005 MIU/L-ACNC: 1.11 UIU/ML (ref 0.45–4.5)

## 2024-07-19 RX ORDER — CARVEDILOL 12.5 MG/1
12.5 TABLET ORAL 2 TIMES DAILY WITH MEALS
Qty: 60 TABLET | Refills: 0 | Status: SHIPPED | OUTPATIENT
Start: 2024-07-19

## 2024-07-19 RX ORDER — TOPIRAMATE 50 MG/1
50 TABLET, FILM COATED ORAL DAILY
Qty: 30 TABLET | Refills: 0 | Status: SHIPPED | OUTPATIENT
Start: 2024-07-19

## 2024-07-19 RX ORDER — DULOXETIN HYDROCHLORIDE 60 MG/1
60 CAPSULE, DELAYED RELEASE ORAL DAILY
Qty: 30 CAPSULE | Refills: 0 | Status: SHIPPED | OUTPATIENT
Start: 2024-07-19

## 2024-07-22 LAB
GAMMA INTERFERON BACKGROUND BLD IA-ACNC: 0.07 IU/ML
M TB IFN-G BLD-IMP: NEGATIVE
M TB IFN-G CD4+ BCKGRND COR BLD-ACNC: 0.17 IU/ML
M TB IFN-G CD4+CD8+ BCKGRND COR BLD-ACNC: 0.12 IU/ML
MITOGEN IGNF BCKGRD COR BLD-ACNC: >10 IU/ML
QUANTIFERON, INCUBATION: NORMAL
SERVICE CMNT-IMP: NORMAL

## 2024-07-26 DIAGNOSIS — G47.00 INSOMNIA, UNSPECIFIED TYPE: ICD-10-CM

## 2024-07-26 RX ORDER — RIZATRIPTAN BENZOATE 10 MG/1
TABLET ORAL
Qty: 28 TABLET | Refills: 1 | Status: SHIPPED | OUTPATIENT
Start: 2024-07-26

## 2024-07-27 RX ORDER — ESZOPICLONE 2 MG/1
TABLET, FILM COATED ORAL
Qty: 30 TABLET | Refills: 0 | Status: SHIPPED | OUTPATIENT
Start: 2024-07-27 | End: 2024-10-27

## 2024-08-04 RX ORDER — LEVOTHYROXINE SODIUM 88 UG/1
88 TABLET ORAL DAILY
Qty: 30 TABLET | Refills: 3 | Status: SHIPPED | OUTPATIENT
Start: 2024-08-04

## 2024-08-12 RX ORDER — CARVEDILOL 12.5 MG/1
12.5 TABLET ORAL 2 TIMES DAILY WITH MEALS
Qty: 60 TABLET | Refills: 2 | Status: SHIPPED | OUTPATIENT
Start: 2024-08-12

## 2024-08-12 RX ORDER — DULOXETIN HYDROCHLORIDE 60 MG/1
60 CAPSULE, DELAYED RELEASE ORAL DAILY
Qty: 30 CAPSULE | Refills: 2 | Status: SHIPPED | OUTPATIENT
Start: 2024-08-12

## 2024-08-12 RX ORDER — ALBUTEROL SULFATE 90 UG/1
2 AEROSOL, METERED RESPIRATORY (INHALATION) 4 TIMES DAILY PRN
Qty: 18 G | Refills: 0 | Status: SHIPPED | OUTPATIENT
Start: 2024-08-12

## 2024-08-12 RX ORDER — CYCLOBENZAPRINE HCL 10 MG
10 TABLET ORAL 3 TIMES DAILY PRN
Qty: 45 TABLET | Refills: 0 | Status: SHIPPED | OUTPATIENT
Start: 2024-08-12

## 2024-08-18 RX ORDER — ERGOCALCIFEROL 1.25 MG/1
CAPSULE ORAL
Qty: 12 CAPSULE | Refills: 0 | Status: SHIPPED | OUTPATIENT
Start: 2024-08-18

## 2024-08-21 DIAGNOSIS — E66.01 MORBID OBESITY WITH BMI OF 45.0-49.9, ADULT (HCC): Primary | ICD-10-CM

## 2024-08-21 RX ORDER — SEMAGLUTIDE 0.25 MG/.5ML
0.25 INJECTION, SOLUTION SUBCUTANEOUS
Qty: 2 ML | Refills: 0 | Status: SHIPPED | OUTPATIENT
Start: 2024-08-21

## 2024-08-22 RX ORDER — TOPIRAMATE 50 MG/1
50 TABLET, FILM COATED ORAL DAILY
Qty: 30 TABLET | Refills: 0 | Status: SHIPPED | OUTPATIENT
Start: 2024-08-22

## 2024-08-23 DIAGNOSIS — G47.00 INSOMNIA, UNSPECIFIED TYPE: ICD-10-CM

## 2024-08-24 RX ORDER — ONDANSETRON 4 MG/1
TABLET, FILM COATED ORAL
Qty: 30 TABLET | Refills: 0 | Status: SHIPPED | OUTPATIENT
Start: 2024-08-24

## 2024-08-25 RX ORDER — ESZOPICLONE 2 MG/1
TABLET, FILM COATED ORAL
Qty: 30 TABLET | Refills: 0 | Status: SHIPPED | OUTPATIENT
Start: 2024-08-25 | End: 2024-09-25

## 2024-09-08 RX ORDER — CYCLOBENZAPRINE HCL 10 MG
10 TABLET ORAL 3 TIMES DAILY PRN
Qty: 45 TABLET | Refills: 0 | Status: SHIPPED | OUTPATIENT
Start: 2024-09-08

## 2024-09-19 RX ORDER — LEVOTHYROXINE SODIUM 88 UG/1
88 TABLET ORAL DAILY
Qty: 90 TABLET | Refills: 0 | Status: SHIPPED | OUTPATIENT
Start: 2024-09-19

## 2024-09-19 RX ORDER — CARVEDILOL 12.5 MG/1
12.5 TABLET ORAL 2 TIMES DAILY WITH MEALS
Qty: 180 TABLET | Refills: 0 | Status: SHIPPED | OUTPATIENT
Start: 2024-09-19

## 2024-09-19 RX ORDER — TOPIRAMATE 50 MG/1
50 TABLET, FILM COATED ORAL DAILY
Qty: 30 TABLET | Refills: 0 | Status: SHIPPED | OUTPATIENT
Start: 2024-09-19

## 2024-09-19 RX ORDER — DULOXETIN HYDROCHLORIDE 60 MG/1
60 CAPSULE, DELAYED RELEASE ORAL DAILY
Qty: 90 CAPSULE | Refills: 0 | Status: SHIPPED | OUTPATIENT
Start: 2024-09-19

## 2024-09-20 DIAGNOSIS — G47.00 INSOMNIA, UNSPECIFIED TYPE: ICD-10-CM

## 2024-09-20 RX ORDER — CYCLOBENZAPRINE HCL 10 MG
10 TABLET ORAL 3 TIMES DAILY PRN
Qty: 45 TABLET | Refills: 0 | Status: SHIPPED | OUTPATIENT
Start: 2024-09-20

## 2024-09-20 RX ORDER — ESZOPICLONE 2 MG/1
TABLET, FILM COATED ORAL
Qty: 30 TABLET | Refills: 1 | Status: SHIPPED | OUTPATIENT
Start: 2024-09-20 | End: 2024-11-20

## 2024-10-05 RX ORDER — CYCLOBENZAPRINE HCL 10 MG
10 TABLET ORAL 3 TIMES DAILY PRN
Qty: 45 TABLET | Refills: 0 | Status: SHIPPED | OUTPATIENT
Start: 2024-10-05

## 2024-10-07 DIAGNOSIS — G47.00 INSOMNIA, UNSPECIFIED TYPE: Primary | ICD-10-CM

## 2024-10-07 RX ORDER — ESZOPICLONE 3 MG/1
3 TABLET, FILM COATED ORAL NIGHTLY PRN
Qty: 30 TABLET | Refills: 0 | Status: SHIPPED | OUTPATIENT
Start: 2024-10-07 | End: 2024-11-06

## 2024-10-11 ENCOUNTER — APPOINTMENT (OUTPATIENT)
Facility: HOSPITAL | Age: 54
End: 2024-10-11
Payer: MEDICAID

## 2024-10-11 ENCOUNTER — HOSPITAL ENCOUNTER (EMERGENCY)
Facility: HOSPITAL | Age: 54
Discharge: HOME | End: 2024-10-11
Attending: EMERGENCY MEDICINE
Payer: MEDICAID

## 2024-10-11 VITALS
OXYGEN SATURATION: 98 % | TEMPERATURE: 97.8 F | SYSTOLIC BLOOD PRESSURE: 150 MMHG | DIASTOLIC BLOOD PRESSURE: 112 MMHG | WEIGHT: 293 LBS | HEART RATE: 92 BPM | RESPIRATION RATE: 18 BRPM | BODY MASS INDEX: 43.4 KG/M2 | HEIGHT: 69 IN

## 2024-10-11 DIAGNOSIS — K59.00 CONSTIPATION, UNSPECIFIED CONSTIPATION TYPE: Primary | ICD-10-CM

## 2024-10-11 PROCEDURE — 6360000002 HC RX W HCPCS: Performed by: EMERGENCY MEDICINE

## 2024-10-11 PROCEDURE — 74176 CT ABD & PELVIS W/O CONTRAST: CPT

## 2024-10-11 PROCEDURE — 99284 EMERGENCY DEPT VISIT MOD MDM: CPT

## 2024-10-11 PROCEDURE — 96372 THER/PROPH/DIAG INJ SC/IM: CPT

## 2024-10-11 RX ORDER — DICYCLOMINE HYDROCHLORIDE 10 MG/ML
20 INJECTION INTRAMUSCULAR
Status: COMPLETED | OUTPATIENT
Start: 2024-10-11 | End: 2024-10-11

## 2024-10-11 RX ORDER — MAGNESIUM CITRATE
300 SOLUTION, ORAL ORAL DAILY PRN
Qty: 600 ML | Refills: 0 | Status: SHIPPED | OUTPATIENT
Start: 2024-10-11 | End: 2024-10-13

## 2024-10-11 RX ADMIN — DICYCLOMINE HYDROCHLORIDE 20 MG: 10 INJECTION, SOLUTION INTRAMUSCULAR at 08:11

## 2024-10-11 ASSESSMENT — PAIN SCALES - GENERAL: PAINLEVEL_OUTOF10: 5

## 2024-10-11 ASSESSMENT — PAIN DESCRIPTION - LOCATION: LOCATION: ABDOMEN

## 2024-10-11 ASSESSMENT — PAIN DESCRIPTION - DESCRIPTORS: DESCRIPTORS: ACHING

## 2024-10-11 ASSESSMENT — ENCOUNTER SYMPTOMS
SORE THROAT: 0
COUGH: 0
VOMITING: 0

## 2024-10-11 ASSESSMENT — PAIN - FUNCTIONAL ASSESSMENT: PAIN_FUNCTIONAL_ASSESSMENT: 0-10

## 2024-10-11 ASSESSMENT — PAIN DESCRIPTION - ORIENTATION: ORIENTATION: RIGHT;LEFT

## 2024-10-11 NOTE — ED PROVIDER NOTES
[facility] EMERGENCY DEPT  EMERGENCY DEPARTMENT ENCOUNTER      Pt Name: Miya Castillo  MRN: [id number]  Birthdate 1970  Date of evaluation: 10/11/2024  Provider: Remy Kulkarni MD    CHIEF COMPLAINT       Chief Complaint   Patient presents with    Constipation         HISTORY OF PRESENT ILLNESS   (Location/Symptom, Timing/Onset, Context/Setting, Quality, Duration, Modifying Factors, Severity)  Note limiting factors.   53-year-old female presents from home with complaints of constipation.  States she has not had a good bowel movement in over a week.  Yesterday she passed a little hard balls.  States she has been using over-the-counter MiraLAX and bisacodyl but without relief.  Patient adds she is currently on Ozempic for prediabetes and weight loss.  Denies any fever, vomiting, urinary symptoms.  Complaining of lower abdominal cramping pain.    The history is provided by the patient.         Review of External Medical Records:     Nursing Notes were reviewed.    REVIEW OF SYSTEMS    (2-9 systems for level 4, 10 or more for level 5)     Review of Systems   Constitutional:  Negative for fatigue.   HENT:  Negative for sore throat.    Eyes:  Negative for visual disturbance.   Respiratory:  Negative for cough.    Cardiovascular:  Negative for palpitations.   Gastrointestinal:  Negative for vomiting.   Genitourinary:  Negative for difficulty urinating.   Musculoskeletal:  Negative for myalgias.   Skin:  Negative for rash.   Neurological:  Negative for weakness.       Except as noted above the remainder of the review of systems was reviewed and negative.       PAST MEDICAL HISTORY     Past Medical History:   Diagnosis Date    Arthritis     Chronic knee pain 03/24/2010    Chronic low back pain 03/24/2010    Chronic pain     CKD (chronic kidney disease) stage 3, GFR 30-59 ml/min ([facility])     Depression 03/24/2010    Fibromyalgia 11/24/2014    Genital herpes 03/24/2010    GERD (gastroesophageal reflux disease) 03/24/2010

## 2024-10-11 NOTE — ED TRIAGE NOTES
Patient hx of IBS-C arrives reporting she is taking ozempic for pre-diabetes and weightloss (last injection Saturday) and has not had a bowel movement for 3 days that was hard and \"little balls.\" Reports associated abdominal pain, difficulty passing gas. Has been taking zofran for nausea from the ozempic. Has been using suppositories, miralax, bisacodyl all without relief.

## 2024-10-14 RX ORDER — FLUCONAZOLE 150 MG/1
150 TABLET ORAL
Qty: 3 TABLET | Refills: 0 | OUTPATIENT
Start: 2024-10-14 | End: 2024-10-23

## 2024-10-16 DIAGNOSIS — E03.9 HYPOTHYROIDISM, UNSPECIFIED TYPE: ICD-10-CM

## 2024-10-16 DIAGNOSIS — E78.00 HYPERCHOLESTEROLEMIA: ICD-10-CM

## 2024-10-16 DIAGNOSIS — R73.09 ELEVATED GLUCOSE LEVEL: ICD-10-CM

## 2024-10-24 RX ORDER — CYCLOBENZAPRINE HCL 10 MG
10 TABLET ORAL 3 TIMES DAILY PRN
Qty: 45 TABLET | Refills: 0 | Status: SHIPPED | OUTPATIENT
Start: 2024-10-24

## 2024-10-31 RX ORDER — SEMAGLUTIDE 0.68 MG/ML
INJECTION, SOLUTION SUBCUTANEOUS
Qty: 3 ML | Refills: 0 | Status: SHIPPED | OUTPATIENT
Start: 2024-10-31

## 2024-11-01 RX ORDER — ONDANSETRON 4 MG/1
4 TABLET, FILM COATED ORAL EVERY 12 HOURS PRN
Qty: 30 TABLET | Refills: 0 | Status: SHIPPED | OUTPATIENT
Start: 2024-11-01

## 2024-11-04 RX ORDER — TOPIRAMATE 50 MG/1
50 TABLET, FILM COATED ORAL DAILY
Qty: 30 TABLET | Refills: 0 | Status: SHIPPED | OUTPATIENT
Start: 2024-11-04

## 2024-11-04 RX ORDER — TOPIRAMATE 50 MG/1
50 TABLET, FILM COATED ORAL DAILY
Qty: 30 TABLET | Refills: 0 | Status: SHIPPED | OUTPATIENT
Start: 2024-11-04 | End: 2024-11-04

## 2024-11-09 DIAGNOSIS — G47.00 INSOMNIA, UNSPECIFIED TYPE: ICD-10-CM

## 2024-11-09 RX ORDER — CYCLOBENZAPRINE HCL 10 MG
TABLET ORAL
Qty: 45 TABLET | Refills: 0 | Status: SHIPPED | OUTPATIENT
Start: 2024-11-09

## 2024-11-10 RX ORDER — ESZOPICLONE 3 MG/1
TABLET, FILM COATED ORAL
Qty: 30 TABLET | Refills: 0 | Status: SHIPPED | OUTPATIENT
Start: 2024-11-10 | End: 2024-12-10

## 2024-11-13 DIAGNOSIS — G47.00 INSOMNIA, UNSPECIFIED TYPE: ICD-10-CM

## 2024-11-14 RX ORDER — ESZOPICLONE 3 MG/1
TABLET, FILM COATED ORAL
Qty: 30 TABLET | Refills: 0 | OUTPATIENT
Start: 2024-11-14 | End: 2024-12-14

## 2024-11-16 RX ORDER — ERGOCALCIFEROL 1.25 MG/1
CAPSULE, LIQUID FILLED ORAL
Qty: 12 CAPSULE | Refills: 0 | Status: SHIPPED | OUTPATIENT
Start: 2024-11-16

## 2024-11-25 DIAGNOSIS — E66.01 MORBID OBESITY WITH BMI OF 45.0-49.9, ADULT: Primary | ICD-10-CM

## 2024-11-26 NOTE — TELEPHONE ENCOUNTER
LM for patient to call.  Does she have insurance? There is no coverage listed in chart. I am not able to do prior authorization for medication without coverage.

## 2024-12-08 DIAGNOSIS — G47.00 INSOMNIA, UNSPECIFIED TYPE: ICD-10-CM

## 2024-12-09 RX ORDER — ESZOPICLONE 3 MG/1
TABLET, FILM COATED ORAL
Qty: 30 TABLET | Refills: 0 | Status: SHIPPED | OUTPATIENT
Start: 2024-12-09 | End: 2025-01-08

## 2024-12-11 ENCOUNTER — TELEPHONE (OUTPATIENT)
Facility: CLINIC | Age: 54
End: 2024-12-11

## 2024-12-11 ENCOUNTER — PATIENT MESSAGE (OUTPATIENT)
Facility: CLINIC | Age: 54
End: 2024-12-11

## 2024-12-11 NOTE — TELEPHONE ENCOUNTER
Patient is requesting to be worked in for shoulder pain. Patient is having pain and states she can't move her shoulders behind her back to fasten bra or move them in certain positions.   Patient was offered appointment for tomorrow but she is unable to come. Patient stated she will go to the Emergency Room.    [employer]

## 2024-12-13 ENCOUNTER — HOSPITAL ENCOUNTER (EMERGENCY)
Facility: HOSPITAL | Age: 54
Discharge: HOME | End: 2024-12-13
Attending: STUDENT IN AN ORGANIZED HEALTH CARE EDUCATION/TRAINING PROGRAM
Payer: MEDICAID

## 2024-12-13 ENCOUNTER — APPOINTMENT (OUTPATIENT)
Facility: HOSPITAL | Age: 54
End: 2024-12-13
Payer: MEDICAID

## 2024-12-13 VITALS
DIASTOLIC BLOOD PRESSURE: 80 MMHG | WEIGHT: 293 LBS | HEIGHT: 69 IN | OXYGEN SATURATION: 97 % | BODY MASS INDEX: 43.4 KG/M2 | SYSTOLIC BLOOD PRESSURE: 122 MMHG | HEART RATE: 72 BPM | TEMPERATURE: 97.7 F | RESPIRATION RATE: 16 BRPM

## 2024-12-13 DIAGNOSIS — M25.511 ACUTE PAIN OF BOTH SHOULDERS: Primary | ICD-10-CM

## 2024-12-13 DIAGNOSIS — M25.512 ACUTE PAIN OF BOTH SHOULDERS: Primary | ICD-10-CM

## 2024-12-13 PROCEDURE — 99283 EMERGENCY DEPT VISIT LOW MDM: CPT

## 2024-12-13 PROCEDURE — 6370000000 HC RX 637 (ALT 250 FOR IP)

## 2024-12-13 PROCEDURE — 73030 X-RAY EXAM OF SHOULDER: CPT

## 2024-12-13 RX ORDER — METHYLPREDNISOLONE 4 MG/1
TABLET ORAL
Qty: 1 KIT | Refills: 0 | Status: SHIPPED | OUTPATIENT
Start: 2024-12-13 | End: 2024-12-19

## 2024-12-13 RX ORDER — LIDOCAINE 4 G/G
1 PATCH TOPICAL DAILY
Qty: 10 PATCH | Refills: 0 | Status: SHIPPED | OUTPATIENT
Start: 2024-12-13

## 2024-12-13 RX ORDER — TOPIRAMATE 50 MG/1
50 TABLET, FILM COATED ORAL DAILY
Qty: 30 TABLET | Refills: 0 | Status: SHIPPED | OUTPATIENT
Start: 2024-12-13

## 2024-12-13 RX ORDER — TRAMADOL HYDROCHLORIDE 50 MG/1
50 TABLET ORAL EVERY 4 HOURS PRN
Qty: 12 TABLET | Refills: 0 | Status: SHIPPED | OUTPATIENT
Start: 2024-12-13 | End: 2024-12-15

## 2024-12-13 RX ORDER — LIDOCAINE 4 G/G
1 PATCH TOPICAL DAILY
Status: DISCONTINUED | OUTPATIENT
Start: 2024-12-13 | End: 2024-12-13 | Stop reason: HOSPADM

## 2024-12-13 RX ORDER — ACETAMINOPHEN 500 MG
1000 TABLET ORAL ONCE
Status: DISCONTINUED | OUTPATIENT
Start: 2024-12-13 | End: 2024-12-13 | Stop reason: HOSPADM

## 2024-12-13 ASSESSMENT — PAIN DESCRIPTION - LOCATION
LOCATION: SHOULDER
LOCATION: SHOULDER

## 2024-12-13 ASSESSMENT — PAIN - FUNCTIONAL ASSESSMENT
PAIN_FUNCTIONAL_ASSESSMENT: 0-10
PAIN_FUNCTIONAL_ASSESSMENT: PREVENTS OR INTERFERES SOME ACTIVE ACTIVITIES AND ADLS
PAIN_FUNCTIONAL_ASSESSMENT: 0-10

## 2024-12-13 ASSESSMENT — PAIN SCALES - GENERAL
PAINLEVEL_OUTOF10: 7
PAINLEVEL_OUTOF10: 9

## 2024-12-13 ASSESSMENT — PAIN DESCRIPTION - ORIENTATION
ORIENTATION: RIGHT;LEFT
ORIENTATION: LEFT;RIGHT

## 2024-12-13 ASSESSMENT — PAIN DESCRIPTION - DESCRIPTORS
DESCRIPTORS: ACHING
DESCRIPTORS: ACHING

## 2024-12-13 ASSESSMENT — PAIN DESCRIPTION - PAIN TYPE: TYPE: ACUTE PAIN

## 2024-12-13 NOTE — ED TRIAGE NOTES
Patient arrives c/o of bilateral shoulder pain x 4 weeks without known injury. Reports popping and clicking with movement. Patient reports laying on her side for deviated.    Taking tylenol, lidocaine patches, ibuprofen and alternating heat and ice without relief.

## 2024-12-13 NOTE — ED PROVIDER NOTES
[facility] [place]ERGENCY DEPT  EMERGENCY DEPARTMENT ENCOUNTER      Date: 12/13/2024  Patient Name: Miya Castillo  MRN: [id number]  Birthdate 1970  Date of evaluation: 12/13/2024  Provider: DENA Son NP   Note Started: 10:12 AM EST 12/13/24    CHIEF COMPLAINT     Chief Complaint   Patient presents with    Shoulder Pain     R and L       HISTORY OF PRESENT ILLNESS  (Onset, Location, Duration, Character, Alleviating/Aggravating, Radiation, Timing, Severity)   Note limiting factors.   History Provided By: Patient     HPI: Miya Castillo is a 54 y.o. female with a history as reviewed below presents with shoulder pain.  Patient states onset of left anterior shoulder pain without trauma or inciting event 11/1/2024.  Since that time it has progressively worsened and she is now experiencing pain in the right anterior shoulder as well.  She feels it is related to how she sleeps on the left side.  States the pain is associated with \"clicking and popping.\"  Worse with movement and lifting.  She has been taking multiple OTC treatments without relief.  States she was offered an appointment at her PCP yesterday that she could not get to.  She denies fevers, chest pain, difficulty breathing, palpitations, back pain, lower extremity edema, trauma, numbness.  She does endorse reduced ROM due to pain.  No surgical history to the joint.      Nursing Notes and triage vitals were reviewed.  PCP: Josselin Dodge MD      PAST MEDICAL HISTORY   Past Medical History:  Past Medical History:   Diagnosis Date    Arthritis     Chronic knee pain 03/24/2010    Chronic low back pain 03/24/2010    Chronic pain     CKD (chronic kidney disease) stage 3, GFR 30-59 ml/min ([facility])     Depression 03/24/2010    Fibromyalgia 11/24/2014    Genital herpes 03/24/2010    GERD (gastroesophageal reflux disease) 03/24/2010    H/O endoscopic retrograde cholangiopancreatography 08/05/2013    Hyperlipidemia 03/24/2010    Hypothyroidism 03/24/2010     back: Normal range of motion.      Right lower leg: No edema.      Left lower leg: No edema.      Comments: Tenderness to bilateral anterior shoulders without overlying skin abnormality.  Left greater than right.  Distal neurovascular status intact.  No crepitus, effusion, deformity.   Skin:     General: Skin is warm and dry.      Findings: No bruising or erythema.   Neurological:      General: No focal deficit present.      Mental Status: She is alert and oriented to person, place, and time.   Psychiatric:         Mood and Affect: Mood normal.         Behavior: Behavior normal.         DIAGNOSTIC RESULTS     EKG: All EKG's are interpreted by the Emergency Department Physician who either signs or Co-signs this chart in the absence of a cardiologist.  EKG:.Not Applicable      RADIOLOGIC STUDIES:   Non-plain film images such as CT, Ultrasound and MRI are read by the radiologist. Plain radiographic images are visualized and preliminarily interpreted by the ED Provider with the following findings: See ED Course Below    Interpretation per the Radiologist below, if available at the time of this note:  XR SHOULDER RIGHT (MIN 2 VIEWS)   Final Result   No acute abnormality.      Electronically signed by SAM LAIENZ      XR SHOULDER LEFT (MIN 2 VIEWS)   Final Result   No acute abnormality.      Electronically signed by SAM LAINEZ           LABS:  Labs Reviewed - No data to display  No results found for this or any previous visit (from the past 12 hour(s)).    All other labs were within normal range or not returned as of this dictation.    EMERGENCY DEPARTMENT COURSE  (Differential Diagnosis / MDM / Reassessment / Consults / Education)   Vitals:    Vitals:    12/13/24 0905 12/13/24 0908   BP:  (!) 141/99   Pulse: 81    Resp: 16    Temp: 97.7 °F (36.5 °C)    TempSrc: Oral    SpO2: 99%    Weight: (!) 140.2 kg (309 lb)    Height: 1.753 m (5' 9\")        Medical Decision Making    54-year-old female presents with bilateral

## 2024-12-13 NOTE — DISCHARGE INSTRUCTIONS
Thank you for allowing us to provide you with medical care today.  We realize that you have many choices for your emergency care needs.  We thank you for choosing [facility].  Please choose us in the future for any continued health care needs.     We hope we addressed all of your medical concerns. We strive to provide excellent quality care in the Emergency Department.  Anything less than excellent does not meet our expectations.     The exam and treatment you received in the Emergency Department were for an emergent problem and are not intended as complete care. It is important that you follow up with a doctor, nurse practitioner, or physician’s assistant for ongoing care. If your symptoms worsen or you do not improve as expected and you are unable to reach your usual health care provider, you should return to the Emergency Department. We are available 24 hours a day.     Take this sheet with you when you go to your follow-up visit.     If you have any problem arranging the follow-up visit, contact the Emergency Department immediately.     Make an appointment your family doctor for follow up of this visit. Return to the ER if you are unable to be seen in a timely manner.     Below is a summary of your results.    Labs  No results found for this or any previous visit (from the past 12 hour(s)).    Radiologic Studies  XR SHOULDER RIGHT (MIN 2 VIEWS)   Final Result   No acute abnormality.      Electronically signed by SAM LAINEZ      XR SHOULDER LEFT (MIN 2 VIEWS)   Final Result   No acute abnormality.      Electronically signed by SAM LAINEZ           Chronic atrial fibrillation

## 2024-12-22 ENCOUNTER — PATIENT MESSAGE (OUTPATIENT)
Facility: CLINIC | Age: 54
End: 2024-12-22

## 2024-12-23 RX ORDER — LIDOCAINE 50 MG/G
1 PATCH TOPICAL DAILY
Qty: 30 PATCH | Refills: 0 | Status: SHIPPED | OUTPATIENT
Start: 2024-12-23 | End: 2025-01-22

## 2024-12-28 RX ORDER — ALBUTEROL SULFATE 90 UG/1
INHALANT RESPIRATORY (INHALATION)
Qty: 18 G | Refills: 0 | Status: SHIPPED | OUTPATIENT
Start: 2024-12-28

## 2024-12-30 RX ORDER — ONDANSETRON 4 MG/1
TABLET, FILM COATED ORAL
Qty: 30 TABLET | Refills: 0 | Status: SHIPPED | OUTPATIENT
Start: 2024-12-30

## 2024-12-31 ENCOUNTER — OFFICE VISIT (OUTPATIENT)
Age: 54
End: 2024-12-31
Payer: MEDICAID

## 2024-12-31 ENCOUNTER — TELEPHONE (OUTPATIENT)
Facility: CLINIC | Age: 54
End: 2024-12-31

## 2024-12-31 VITALS
DIASTOLIC BLOOD PRESSURE: 76 MMHG | SYSTOLIC BLOOD PRESSURE: 106 MMHG | WEIGHT: 293 LBS | BODY MASS INDEX: 43.4 KG/M2 | HEIGHT: 69 IN | OXYGEN SATURATION: 96 % | TEMPERATURE: 97.7 F | HEART RATE: 100 BPM

## 2024-12-31 DIAGNOSIS — M75.82 ROTATOR CUFF TENDINITIS, LEFT: ICD-10-CM

## 2024-12-31 DIAGNOSIS — M75.01 ADHESIVE CAPSULITIS OF RIGHT SHOULDER: ICD-10-CM

## 2024-12-31 DIAGNOSIS — M25.511 ACUTE PAIN OF BOTH SHOULDERS: ICD-10-CM

## 2024-12-31 DIAGNOSIS — M75.81 RIGHT ROTATOR CUFF TENDONITIS: ICD-10-CM

## 2024-12-31 DIAGNOSIS — M75.52 SUBACROMIAL BURSITIS OF LEFT SHOULDER JOINT: Primary | ICD-10-CM

## 2024-12-31 DIAGNOSIS — M75.51 SUBACROMIAL BURSITIS OF RIGHT SHOULDER JOINT: ICD-10-CM

## 2024-12-31 DIAGNOSIS — M25.512 ACUTE PAIN OF BOTH SHOULDERS: ICD-10-CM

## 2024-12-31 DIAGNOSIS — U07.1 COVID-19: Primary | ICD-10-CM

## 2024-12-31 DIAGNOSIS — M75.02 ADHESIVE CAPSULITIS OF LEFT SHOULDER: ICD-10-CM

## 2024-12-31 PROCEDURE — 3078F DIAST BP <80 MM HG: CPT | Performed by: ORTHOPAEDIC SURGERY

## 2024-12-31 PROCEDURE — 3074F SYST BP LT 130 MM HG: CPT | Performed by: ORTHOPAEDIC SURGERY

## 2024-12-31 PROCEDURE — 99204 OFFICE O/P NEW MOD 45 MIN: CPT | Performed by: ORTHOPAEDIC SURGERY

## 2024-12-31 RX ORDER — DICLOFENAC SODIUM 75 MG/1
75 TABLET, DELAYED RELEASE ORAL 2 TIMES DAILY
Qty: 60 TABLET | Refills: 1 | Status: SHIPPED | OUTPATIENT
Start: 2024-12-31

## 2024-12-31 ASSESSMENT — PATIENT HEALTH QUESTIONNAIRE - PHQ9
1. LITTLE INTEREST OR PLEASURE IN DOING THINGS: NOT AT ALL
2. FEELING DOWN, DEPRESSED OR HOPELESS: NOT AT ALL
SUM OF ALL RESPONSES TO PHQ QUESTIONS 1-9: 0
SUM OF ALL RESPONSES TO PHQ QUESTIONS 1-9: 0
SUM OF ALL RESPONSES TO PHQ9 QUESTIONS 1 & 2: 0
SUM OF ALL RESPONSES TO PHQ QUESTIONS 1-9: 0
SUM OF ALL RESPONSES TO PHQ QUESTIONS 1-9: 0

## 2024-12-31 NOTE — PROGRESS NOTES
Identified pt with two pt identifiers (name and ). Reviewed chart in preparation for visit and have obtained necessary documentation.    Miya Castillo is a 54 y.o. female New Patient (Bilateral shoulder pain started in November. Went to ER 2024 was giving RX but nothing is helping. )  .    Vitals:    24 1458   BP: 106/76   Site: Right Upper Arm   Position: Sitting   Cuff Size: Large Adult   Pulse: 100   Temp: 97.7 °F (36.5 °C)   TempSrc: Temporal   SpO2: 96%   Weight: (!) 141.5 kg (312 lb)   Height: 1.753 m (5' 9.02\")          1. Have you been to the ER, urgent care clinic since your last visit?  Hospitalized since your last visit?  no     2. Have you seen or consulted any other health care providers outside of the [facility] since your last visit?  Include any pap smears or colon screening.  no  
She denies fevers, chest pain, difficulty breathing, palpitations, back pain, lower extremity edema, trauma, numbness.  She does endorse reduced ROM due to pain.  No surgical history to the joint.        Nursing Notes and triage vitals were reviewed.  PCP: Josselin Dodge MD    Social History     Occupational History    Not on file   Tobacco Use    Smoking status: Former    Smokeless tobacco: Never   Vaping Use    Vaping status: Never Used   Substance and Sexual Activity    Alcohol use: Not Currently    Drug use: No     Types: OTC, Prescription    Sexual activity: Yes     Partners: Male     Birth control/protection: None      Past Medical History:  Past Medical History        Past Medical History:   Diagnosis Date    Arthritis      Chronic knee pain 2010    Chronic low back pain 2010    Chronic pain      CKD (chronic kidney disease) stage 3, GFR 30-59 ml/min ([facility])      Depression 2010    Fibromyalgia 2014    Genital herpes 2010    GERD (gastroesophageal reflux disease) 2010    H/O endoscopic retrograde cholangiopancreatography 2013    Hyperlipidemia 2010    Hypothyroidism 2010    Migraines 2010    Obesity 2010    Persistent disorder of initiating or maintaining sleep 2010    Plantar fasciitis 2010         Past Surgical History:  Past Surgical History         Past Surgical History:   Procedure Laterality Date     SECTION        CHOLECYSTECTOMY   2013     emergency cholectomy    CHOLECYSTECTOMY   2013    GYN   ,     hysterectomy,endometrial ablation    HYSTERECTOMY (CERVIX STATUS UNKNOWN)                     Family History:  Family History   No family history on file.   Allergies:  Allergies         Allergies   Allergen Reactions    Trazodone And Nefazodone Swelling    Dermatitis Antigen Dermatitis, Rash and Swelling    Codeine Other (See Comments)       Other reaction(s): Other (See Comments)  \"Severe

## 2025-01-01 NOTE — PROGRESS NOTES
Patient message received through Lela   I called Ms. Castillo and patient stated that yesterday her symptoms started, and she had subjective mild fevers and chills and bodyaches and then she took it at home Covid test that was positive.  She reports no shortness of breath does report some productive cough has not use any over-the-counter cough medicines yet  on the phone, patient did not sound in any acute distress or have any trouble breathing.  Patient denying any chest pain, nausea, vomiting, abdominal pain, diarrhea, constipation.  I discussed with patient that she may take Robitussin for a cough Tylenol for fever and I sent in paxlovid   I did discuss with patient if her symptoms worsen she may call our office or also go to the emergency department

## 2025-01-05 DIAGNOSIS — G47.00 INSOMNIA, UNSPECIFIED TYPE: ICD-10-CM

## 2025-01-06 RX ORDER — ESZOPICLONE 3 MG/1
3 TABLET, FILM COATED ORAL NIGHTLY PRN
Qty: 30 TABLET | Refills: 0 | Status: SHIPPED | OUTPATIENT
Start: 2025-01-06 | End: 2025-02-05

## 2025-01-17 RX ORDER — CARVEDILOL 12.5 MG/1
12.5 TABLET ORAL 2 TIMES DAILY WITH MEALS
Qty: 60 TABLET | Refills: 0 | Status: SHIPPED | OUTPATIENT
Start: 2025-01-17

## 2025-01-17 RX ORDER — DULOXETIN HYDROCHLORIDE 60 MG/1
60 CAPSULE, DELAYED RELEASE ORAL DAILY
Qty: 30 CAPSULE | Refills: 0 | Status: SHIPPED | OUTPATIENT
Start: 2025-01-17

## 2025-01-27 ENCOUNTER — TELEPHONE (OUTPATIENT)
Age: 55
End: 2025-01-27

## 2025-01-27 DIAGNOSIS — M75.82 ROTATOR CUFF TENDINITIS, LEFT: ICD-10-CM

## 2025-01-27 DIAGNOSIS — M75.52 SUBACROMIAL BURSITIS OF LEFT SHOULDER JOINT: Primary | ICD-10-CM

## 2025-01-27 DIAGNOSIS — M75.02 ADHESIVE CAPSULITIS OF LEFT SHOULDER: ICD-10-CM

## 2025-01-27 DIAGNOSIS — M25.512 ACUTE PAIN OF BOTH SHOULDERS: ICD-10-CM

## 2025-01-27 DIAGNOSIS — M25.511 ACUTE PAIN OF BOTH SHOULDERS: ICD-10-CM

## 2025-01-27 NOTE — TELEPHONE ENCOUNTER
Pt called requesting a PT referral be sent to [facility]. Pt stated that [facility] was booked out for a while. Pt stated that she was able to get an appointment with [facility] in [place] sooner and that they required a referral. PT referral for both shoulders can be faxed to [facility] at [phone].

## 2025-01-28 RX ORDER — FLUTICASONE PROPIONATE 50 MCG
2 SPRAY, SUSPENSION (ML) NASAL DAILY
Qty: 16 G | Refills: 0 | Status: SHIPPED | OUTPATIENT
Start: 2025-01-28

## 2025-02-03 RX ORDER — NITROGLYCERIN 0.4 MG/1
0.4 TABLET SUBLINGUAL EVERY 5 MIN PRN
Qty: 25 TABLET | Refills: 0 | Status: SHIPPED | OUTPATIENT
Start: 2025-02-03

## 2025-02-04 DIAGNOSIS — G47.00 INSOMNIA, UNSPECIFIED TYPE: ICD-10-CM

## 2025-02-05 RX ORDER — ESZOPICLONE 3 MG/1
3 TABLET, FILM COATED ORAL NIGHTLY
Qty: 30 TABLET | Refills: 0 | Status: SHIPPED | OUTPATIENT
Start: 2025-02-05 | End: 2025-03-07

## 2025-02-06 ENCOUNTER — TELEPHONE (OUTPATIENT)
Age: 55
End: 2025-02-06

## 2025-02-06 RX ORDER — CYCLOBENZAPRINE HCL 10 MG
10 TABLET ORAL 3 TIMES DAILY PRN
Qty: 30 TABLET | Refills: 0 | Status: SHIPPED | OUTPATIENT
Start: 2025-02-06

## 2025-02-06 NOTE — TELEPHONE ENCOUNTER
Placed outbound call to Pt to schedule a steroid injection for her Lt shoulder. No answer, LVM to CB.

## 2025-02-07 RX ORDER — ONDANSETRON 4 MG/1
TABLET, FILM COATED ORAL
Qty: 30 TABLET | Refills: 0 | Status: SHIPPED | OUTPATIENT
Start: 2025-02-07

## 2025-02-08 RX ORDER — ERGOCALCIFEROL 1.25 MG/1
CAPSULE, LIQUID FILLED ORAL
Qty: 12 CAPSULE | Refills: 0 | Status: SHIPPED | OUTPATIENT
Start: 2025-02-08

## 2025-02-10 RX ORDER — TOPIRAMATE 50 MG/1
50 TABLET, FILM COATED ORAL DAILY
Qty: 30 TABLET | Refills: 0 | Status: SHIPPED | OUTPATIENT
Start: 2025-02-10

## 2025-02-14 RX ORDER — LIDOCAINE 50 MG/G
PATCH TOPICAL
Qty: 30 PATCH | Refills: 0 | Status: SHIPPED | OUTPATIENT
Start: 2025-02-14

## 2025-02-14 RX ORDER — CARVEDILOL 12.5 MG/1
12.5 TABLET ORAL 2 TIMES DAILY WITH MEALS
Qty: 60 TABLET | Refills: 0 | Status: SHIPPED | OUTPATIENT
Start: 2025-02-14

## 2025-02-14 RX ORDER — DULOXETIN HYDROCHLORIDE 60 MG/1
60 CAPSULE, DELAYED RELEASE ORAL DAILY
Qty: 30 CAPSULE | Refills: 0 | Status: SHIPPED | OUTPATIENT
Start: 2025-02-14

## 2025-02-20 ENCOUNTER — APPOINTMENT (OUTPATIENT)
Facility: HOSPITAL | Age: 55
End: 2025-02-20
Payer: MEDICAID

## 2025-02-20 ENCOUNTER — HOSPITAL ENCOUNTER (EMERGENCY)
Facility: HOSPITAL | Age: 55
Discharge: HOME | End: 2025-02-20
Attending: EMERGENCY MEDICINE
Payer: MEDICAID

## 2025-02-20 VITALS
DIASTOLIC BLOOD PRESSURE: 97 MMHG | BODY MASS INDEX: 43.4 KG/M2 | TEMPERATURE: 97.5 F | OXYGEN SATURATION: 97 % | HEART RATE: 94 BPM | HEIGHT: 69 IN | WEIGHT: 293 LBS | SYSTOLIC BLOOD PRESSURE: 137 MMHG | RESPIRATION RATE: 17 BRPM

## 2025-02-20 DIAGNOSIS — K57.92 ACUTE DIVERTICULITIS: Primary | ICD-10-CM

## 2025-02-20 PROCEDURE — 96372 THER/PROPH/DIAG INJ SC/IM: CPT

## 2025-02-20 PROCEDURE — 6370000000 HC RX 637 (ALT 250 FOR IP): Performed by: EMERGENCY MEDICINE

## 2025-02-20 PROCEDURE — 99284 EMERGENCY DEPT VISIT MOD MDM: CPT

## 2025-02-20 PROCEDURE — 6360000002 HC RX W HCPCS: Performed by: EMERGENCY MEDICINE

## 2025-02-20 PROCEDURE — 74176 CT ABD & PELVIS W/O CONTRAST: CPT

## 2025-02-20 RX ORDER — METRONIDAZOLE 500 MG/1
500 TABLET ORAL 3 TIMES DAILY
Qty: 30 TABLET | Refills: 0 | Status: SHIPPED | OUTPATIENT
Start: 2025-02-20 | End: 2025-03-02

## 2025-02-20 RX ORDER — KETOROLAC TROMETHAMINE 30 MG/ML
60 INJECTION, SOLUTION INTRAMUSCULAR; INTRAVENOUS
Status: COMPLETED | OUTPATIENT
Start: 2025-02-20 | End: 2025-02-20

## 2025-02-20 RX ORDER — ONDANSETRON 4 MG/1
4 TABLET, ORALLY DISINTEGRATING ORAL 3 TIMES DAILY PRN
Qty: 12 TABLET | Refills: 0 | Status: SHIPPED | OUTPATIENT
Start: 2025-02-20

## 2025-02-20 RX ORDER — ACETAMINOPHEN 500 MG
1000 TABLET ORAL EVERY 8 HOURS PRN
Qty: 360 TABLET | Refills: 1 | Status: SHIPPED | OUTPATIENT
Start: 2025-02-20

## 2025-02-20 RX ORDER — CIPROFLOXACIN 500 MG/1
500 TABLET, FILM COATED ORAL
Status: COMPLETED | OUTPATIENT
Start: 2025-02-20 | End: 2025-02-20

## 2025-02-20 RX ORDER — ACETAMINOPHEN 500 MG
1000 TABLET ORAL
Status: COMPLETED | OUTPATIENT
Start: 2025-02-20 | End: 2025-02-20

## 2025-02-20 RX ORDER — CIPROFLOXACIN 500 MG/1
500 TABLET, FILM COATED ORAL 2 TIMES DAILY
Qty: 20 TABLET | Refills: 0 | Status: SHIPPED | OUTPATIENT
Start: 2025-02-20 | End: 2025-03-02

## 2025-02-20 RX ORDER — OXYCODONE AND ACETAMINOPHEN 5; 325 MG/1; MG/1
1 TABLET ORAL EVERY 8 HOURS PRN
Qty: 6 TABLET | Refills: 0 | Status: SHIPPED | OUTPATIENT
Start: 2025-02-20 | End: 2025-02-22

## 2025-02-20 RX ORDER — IBUPROFEN 200 MG
400 TABLET ORAL EVERY 8 HOURS PRN
Qty: 20 TABLET | Refills: 0 | Status: SHIPPED | OUTPATIENT
Start: 2025-02-20 | End: 2025-02-27

## 2025-02-20 RX ORDER — METRONIDAZOLE 500 MG/1
500 TABLET ORAL
Status: COMPLETED | OUTPATIENT
Start: 2025-02-20 | End: 2025-02-20

## 2025-02-20 RX ADMIN — CIPROFLOXACIN HYDROCHLORIDE 500 MG: 500 TABLET, FILM COATED ORAL at 21:41

## 2025-02-20 RX ADMIN — KETOROLAC TROMETHAMINE 60 MG: 60 INJECTION, SOLUTION INTRAMUSCULAR at 21:42

## 2025-02-20 RX ADMIN — METRONIDAZOLE 500 MG: 500 TABLET ORAL at 21:41

## 2025-02-20 RX ADMIN — ACETAMINOPHEN 1000 MG: 500 TABLET, FILM COATED ORAL at 21:40

## 2025-02-20 ASSESSMENT — PAIN SCALES - GENERAL: PAINLEVEL_OUTOF10: 9

## 2025-02-20 ASSESSMENT — LIFESTYLE VARIABLES
HOW OFTEN DO YOU HAVE A DRINK CONTAINING ALCOHOL: NEVER
HOW MANY STANDARD DRINKS CONTAINING ALCOHOL DO YOU HAVE ON A TYPICAL DAY: PATIENT DOES NOT DRINK

## 2025-02-20 ASSESSMENT — PAIN - FUNCTIONAL ASSESSMENT: PAIN_FUNCTIONAL_ASSESSMENT: 0-10

## 2025-02-20 ASSESSMENT — PAIN DESCRIPTION - DESCRIPTORS: DESCRIPTORS: SHARP

## 2025-02-20 ASSESSMENT — PAIN DESCRIPTION - LOCATION: LOCATION: ABDOMEN

## 2025-02-20 ASSESSMENT — PAIN DESCRIPTION - ORIENTATION: ORIENTATION: LEFT;LOWER

## 2025-02-21 NOTE — ED TRIAGE NOTES
Patient states has possible diverticulitis flare up. States constipated, used fleets suppository at 1500. States has passed \" one ball\" per day for last 4 days.   Abd pain LLQ  began last night. No vomiting.   Patient on semaglutide

## 2025-02-21 NOTE — ED PROVIDER NOTES
MCG tablet take 1 tablet by mouth once daily 90 tablet 0    Semaglutide-Weight Management (WEGOVY) 0.25 MG/0.5ML SOAJ SC injection Inject 0.25 mg into the skin every 7 days 2 mL 0    rizatriptan (MAXALT) 10 MG tablet take 1 tablet by mouth ONCE AS NEEDED FOR MIGRAINE FOR UP TO 1 DOSE --MAY REPEAT IN 2 HRS IF NEEDED 28 tablet 1    Semaglutide,0.25 or 0.5MG/DOS, (OZEMPIC, 0.25 OR 0.5 MG/DOSE,) 2 MG/1.5ML SOPN Inject 0.5 mg into the skin every 7 days 0.5 mg q weekly for 4 weeks and then increase to 0.5 mg 1 Adjustable Dose Pre-filled Pen Syringe 1    furosemide (LASIX) 20 MG tablet take 1 tablet by mouth once daily (Patient taking differently: Take 1 tablet by mouth daily Taking PRN) 30 tablet 0    glycerin-hypromellose- (ARTIFICIAL TEARS) 0.2-0.2-1 % SOLN opthalmic solution Place 1 drop into both eyes 2 times daily 30 mL 3    valACYclovir (VALTREX) 500 MG tablet Take 1 tablet by mouth daily 90 tablet 1    nystatin (MYCOSTATIN) 451444 UNIT/GM powder Apply topically 2 times daily 60 g 1    buPROPion (WELLBUTRIN XL) 150 MG extended release tablet take 1 tablet by mouth once daily 180 tablet 1    Cholecalciferol 50 MCG (2000 UT) CAPS Take one tablet daily         Social Determinants of Health:   Social Determinants of Health     Tobacco Use: Medium Risk (2/20/2025)    Patient History     Smoking Tobacco Use: Former     Smokeless Tobacco Use: Never     Passive Exposure: Not on file   Alcohol Use: Not At Risk (2/20/2025)    AUDIT-C     Frequency of Alcohol Consumption: Never     Average Number of Drinks: Patient does not drink     Frequency of Binge Drinking: Never   Financial Resource Strain: Medium Risk (2/15/2024)    Overall Financial Resource Strain (CARDIA)     Difficulty of Paying Living Expenses: Somewhat hard   Food Insecurity: Food Insecurity Present (2/15/2024)    Hunger Vital Sign     Worried About Running Out of Food in the Last Year: Sometimes true     Ran Out of Food in the Last Year: Sometimes true

## 2025-03-04 DIAGNOSIS — G47.00 INSOMNIA, UNSPECIFIED TYPE: ICD-10-CM

## 2025-03-06 ENCOUNTER — TELEPHONE (OUTPATIENT)
Facility: CLINIC | Age: 55
End: 2025-03-06

## 2025-03-06 RX ORDER — ESZOPICLONE 3 MG/1
TABLET, FILM COATED ORAL
Qty: 30 TABLET | Refills: 0 | Status: SHIPPED | OUTPATIENT
Start: 2025-03-06 | End: 2025-04-05

## 2025-03-06 RX ORDER — TOPIRAMATE 50 MG/1
50 TABLET, FILM COATED ORAL DAILY
Qty: 30 TABLET | Refills: 0 | Status: SHIPPED | OUTPATIENT
Start: 2025-03-06

## 2025-03-06 RX ORDER — CYCLOBENZAPRINE HCL 10 MG
10 TABLET ORAL 3 TIMES DAILY PRN
Qty: 30 TABLET | Refills: 0 | Status: SHIPPED | OUTPATIENT
Start: 2025-03-06

## 2025-03-07 NOTE — TELEPHONE ENCOUNTER
Request for prior authorization for Ozempic was received from pharmacy. Request is in Prior authorization bucket and to be worked on.

## 2025-03-10 RX ORDER — DICLOFENAC SODIUM 75 MG/1
75 TABLET, DELAYED RELEASE ORAL 2 TIMES DAILY
Qty: 60 TABLET | Refills: 3 | Status: SHIPPED | OUTPATIENT
Start: 2025-03-10

## 2025-03-12 RX ORDER — LIDOCAINE 50 MG/G
PATCH TOPICAL
Qty: 30 PATCH | Refills: 0 | Status: SHIPPED | OUTPATIENT
Start: 2025-03-12

## 2025-03-17 RX ORDER — LEVOTHYROXINE SODIUM 88 UG/1
88 TABLET ORAL DAILY
Qty: 30 TABLET | Refills: 0 | Status: SHIPPED | OUTPATIENT
Start: 2025-03-17 | End: 2025-03-19

## 2025-03-18 RX ORDER — DULOXETIN HYDROCHLORIDE 60 MG/1
60 CAPSULE, DELAYED RELEASE ORAL DAILY
Qty: 30 CAPSULE | Refills: 0 | Status: SHIPPED | OUTPATIENT
Start: 2025-03-18

## 2025-03-18 RX ORDER — CARVEDILOL 12.5 MG/1
12.5 TABLET ORAL 2 TIMES DAILY WITH MEALS
Qty: 60 TABLET | Refills: 0 | Status: SHIPPED | OUTPATIENT
Start: 2025-03-18 | End: 2025-03-20

## 2025-03-19 RX ORDER — LEVOTHYROXINE SODIUM 88 UG/1
88 TABLET ORAL DAILY
Qty: 100 TABLET | Refills: 0 | Status: SHIPPED | OUTPATIENT
Start: 2025-03-19

## 2025-03-20 RX ORDER — CARVEDILOL 12.5 MG/1
12.5 TABLET ORAL 2 TIMES DAILY WITH MEALS
Qty: 60 TABLET | Refills: 0 | Status: SHIPPED | OUTPATIENT
Start: 2025-03-20

## 2025-03-20 RX ORDER — ONDANSETRON 4 MG/1
TABLET, FILM COATED ORAL
Qty: 30 TABLET | Refills: 0 | Status: SHIPPED | OUTPATIENT
Start: 2025-03-20

## 2025-03-21 NOTE — TELEPHONE ENCOUNTER
Appointment Scheduled for April 27, 2022 at 1:00 pm This document is complete and the patient is ready for discharge.

## 2025-03-21 NOTE — TELEPHONE ENCOUNTER
Received fax from Pharmacy     Patient needs refill on:       carvedilol (COREG) 12.5 MG tablet # 180      Please send to [facility]

## 2025-03-22 RX ORDER — CARVEDILOL 12.5 MG/1
12.5 TABLET ORAL 2 TIMES DAILY WITH MEALS
Qty: 60 TABLET | Refills: 0 | OUTPATIENT
Start: 2025-03-22

## 2025-03-27 ENCOUNTER — TELEPHONE (OUTPATIENT)
Age: 55
End: 2025-03-27

## 2025-03-27 NOTE — TELEPHONE ENCOUNTER
Called patient regarding appt that was scheduled by Patient through [facility]. Inquired through voice message what type of injection the patient was referring to. Requested the patient to contact our office at [phone]

## 2025-04-03 LAB
ALBUMIN SERPL-MCNC: 4.4 G/DL (ref 3.8–4.9)
ALP SERPL-CCNC: 85 IU/L (ref 44–121)
ALT SERPL-CCNC: 22 IU/L (ref 0–32)
AST SERPL-CCNC: 21 IU/L (ref 0–40)
BILIRUB SERPL-MCNC: 0.4 MG/DL (ref 0–1.2)
BUN SERPL-MCNC: 21 MG/DL (ref 6–24)
BUN/CREAT SERPL: 17 (ref 9–23)
CALCIUM SERPL-MCNC: 10.3 MG/DL (ref 8.7–10.2)
CHLORIDE SERPL-SCNC: 105 MMOL/L (ref 96–106)
CHOLEST SERPL-MCNC: 197 MG/DL (ref 100–199)
CO2 SERPL-SCNC: 22 MMOL/L (ref 20–29)
CREAT SERPL-MCNC: 1.24 MG/DL (ref 0.57–1)
EGFRCR SERPLBLD CKD-EPI 2021: 52 ML/MIN/1.73
GLOBULIN SER CALC-MCNC: 2.7 G/DL (ref 1.5–4.5)
GLUCOSE SERPL-MCNC: 95 MG/DL (ref 70–99)
HBA1C MFR BLD: 5.2 % (ref 4.8–5.6)
HDLC SERPL-MCNC: 40 MG/DL
LDLC SERPL CALC-MCNC: 136 MG/DL (ref 0–99)
POTASSIUM SERPL-SCNC: 4.8 MMOL/L (ref 3.5–5.2)
PROT SERPL-MCNC: 7.1 G/DL (ref 6–8.5)
SODIUM SERPL-SCNC: 142 MMOL/L (ref 134–144)
TRIGL SERPL-MCNC: 114 MG/DL (ref 0–149)
TSH SERPL DL<=0.005 MIU/L-ACNC: 1.08 UIU/ML (ref 0.45–4.5)
VLDLC SERPL CALC-MCNC: 21 MG/DL (ref 5–40)

## 2025-04-06 RX ORDER — CYCLOBENZAPRINE HCL 10 MG
10 TABLET ORAL 3 TIMES DAILY PRN
Qty: 30 TABLET | Refills: 0 | Status: SHIPPED | OUTPATIENT
Start: 2025-04-06

## 2025-04-06 ASSESSMENT — PATIENT HEALTH QUESTIONNAIRE - PHQ9
3. TROUBLE FALLING OR STAYING ASLEEP: MORE THAN HALF THE DAYS
SUM OF ALL RESPONSES TO PHQ QUESTIONS 1-9: 8
1. LITTLE INTEREST OR PLEASURE IN DOING THINGS: SEVERAL DAYS
2. FEELING DOWN, DEPRESSED OR HOPELESS: SEVERAL DAYS
10. IF YOU CHECKED OFF ANY PROBLEMS, HOW DIFFICULT HAVE THESE PROBLEMS MADE IT FOR YOU TO DO YOUR WORK, TAKE CARE OF THINGS AT HOME, OR GET ALONG WITH OTHER PEOPLE: SOMEWHAT DIFFICULT
5. POOR APPETITE OR OVEREATING: SEVERAL DAYS
4. FEELING TIRED OR HAVING LITTLE ENERGY: MORE THAN HALF THE DAYS
SUM OF ALL RESPONSES TO PHQ QUESTIONS 1-9: 8
7. TROUBLE CONCENTRATING ON THINGS, SUCH AS READING THE NEWSPAPER OR WATCHING TELEVISION: NOT AT ALL
6. FEELING BAD ABOUT YOURSELF - OR THAT YOU ARE A FAILURE OR HAVE LET YOURSELF OR YOUR FAMILY DOWN: SEVERAL DAYS
8. MOVING OR SPEAKING SO SLOWLY THAT OTHER PEOPLE COULD HAVE NOTICED. OR THE OPPOSITE, BEING SO FIGETY OR RESTLESS THAT YOU HAVE BEEN MOVING AROUND A LOT MORE THAN USUAL: NOT AT ALL
SUM OF ALL RESPONSES TO PHQ QUESTIONS 1-9: 8
9. THOUGHTS THAT YOU WOULD BE BETTER OFF DEAD, OR OF HURTING YOURSELF: NOT AT ALL
SUM OF ALL RESPONSES TO PHQ QUESTIONS 1-9: 8

## 2025-04-07 ENCOUNTER — TELEPHONE (OUTPATIENT)
Age: 55
End: 2025-04-07

## 2025-04-07 DIAGNOSIS — I71.21 ANEURYSM OF ASCENDING AORTA WITHOUT RUPTURE: Primary | ICD-10-CM

## 2025-04-07 NOTE — PROGRESS NOTES
Pt left message that she will need to reschedule her 4/20 procedure: Called pt back      Left message for patient at      Telephone Information:   Mobile [phone]    to re-schedule 4/20 procedure.  Patient to return call to Ema [phone].   Pt will  need a CTA chest and TTE. After these tests are completed we will have Ms. Barnard follow up with Dr. Vieira to establish care.

## 2025-04-09 ASSESSMENT — PATIENT HEALTH QUESTIONNAIRE - PHQ9
8. MOVING OR SPEAKING SO SLOWLY THAT OTHER PEOPLE COULD HAVE NOTICED. OR THE OPPOSITE - BEING SO FIDGETY OR RESTLESS THAT YOU HAVE BEEN MOVING AROUND A LOT MORE THAN USUAL: NOT AT ALL
6. FEELING BAD ABOUT YOURSELF - OR THAT YOU ARE A FAILURE OR HAVE LET YOURSELF OR YOUR FAMILY DOWN: SEVERAL DAYS
9. THOUGHTS THAT YOU WOULD BE BETTER OFF DEAD, OR OF HURTING YOURSELF: NOT AT ALL
10. IF YOU CHECKED OFF ANY PROBLEMS, HOW DIFFICULT HAVE THESE PROBLEMS MADE IT FOR YOU TO DO YOUR WORK, TAKE CARE OF THINGS AT HOME, OR GET ALONG WITH OTHER PEOPLE: SOMEWHAT DIFFICULT
1. LITTLE INTEREST OR PLEASURE IN DOING THINGS: SEVERAL DAYS
3. TROUBLE FALLING OR STAYING ASLEEP: MORE THAN HALF THE DAYS
SUM OF ALL RESPONSES TO PHQ QUESTIONS 1-9: 8
4. FEELING TIRED OR HAVING LITTLE ENERGY: MORE THAN HALF THE DAYS
2. FEELING DOWN, DEPRESSED OR HOPELESS: SEVERAL DAYS
7. TROUBLE CONCENTRATING ON THINGS, SUCH AS READING THE NEWSPAPER OR WATCHING TELEVISION: NOT AT ALL
5. POOR APPETITE OR OVEREATING: SEVERAL DAYS

## 2025-04-10 ENCOUNTER — OFFICE VISIT (OUTPATIENT)
Age: 55
End: 2025-04-10

## 2025-04-10 DIAGNOSIS — M75.51 SUBACROMIAL BURSITIS OF RIGHT SHOULDER JOINT: ICD-10-CM

## 2025-04-10 DIAGNOSIS — M25.512 ACUTE PAIN OF BOTH SHOULDERS: ICD-10-CM

## 2025-04-10 DIAGNOSIS — M75.52 SUBACROMIAL BURSITIS OF LEFT SHOULDER JOINT: Primary | ICD-10-CM

## 2025-04-10 DIAGNOSIS — M75.02 ADHESIVE CAPSULITIS OF LEFT SHOULDER: ICD-10-CM

## 2025-04-10 DIAGNOSIS — M25.511 ACUTE PAIN OF BOTH SHOULDERS: ICD-10-CM

## 2025-04-10 DIAGNOSIS — M75.82 ROTATOR CUFF TENDINITIS, LEFT: ICD-10-CM

## 2025-04-10 DIAGNOSIS — M75.81 RIGHT ROTATOR CUFF TENDONITIS: ICD-10-CM

## 2025-04-10 DIAGNOSIS — M75.01 ADHESIVE CAPSULITIS OF RIGHT SHOULDER: ICD-10-CM

## 2025-04-10 RX ORDER — TRIAMCINOLONE ACETONIDE 40 MG/ML
40 INJECTION, SUSPENSION INTRA-ARTICULAR; INTRAMUSCULAR ONCE
Status: COMPLETED | OUTPATIENT
Start: 2025-04-10 | End: 2025-04-10

## 2025-04-10 RX ADMIN — TRIAMCINOLONE ACETONIDE 40 MG: 40 INJECTION, SUSPENSION INTRA-ARTICULAR; INTRAMUSCULAR at 14:11

## 2025-04-10 ASSESSMENT — PATIENT HEALTH QUESTIONNAIRE - PHQ9
2. FEELING DOWN, DEPRESSED OR HOPELESS: NOT AT ALL
4. FEELING TIRED OR HAVING LITTLE ENERGY: NOT AT ALL
3. TROUBLE FALLING OR STAYING ASLEEP: NOT AT ALL
1. LITTLE INTEREST OR PLEASURE IN DOING THINGS: NOT AT ALL
8. MOVING OR SPEAKING SO SLOWLY THAT OTHER PEOPLE COULD HAVE NOTICED. OR THE OPPOSITE, BEING SO FIGETY OR RESTLESS THAT YOU HAVE BEEN MOVING AROUND A LOT MORE THAN USUAL: NOT AT ALL
7. TROUBLE CONCENTRATING ON THINGS, SUCH AS READING THE NEWSPAPER OR WATCHING TELEVISION: NOT AT ALL
5. POOR APPETITE OR OVEREATING: NOT AT ALL
10. IF YOU CHECKED OFF ANY PROBLEMS, HOW DIFFICULT HAVE THESE PROBLEMS MADE IT FOR YOU TO DO YOUR WORK, TAKE CARE OF THINGS AT HOME, OR GET ALONG WITH OTHER PEOPLE: NOT DIFFICULT AT ALL
6. FEELING BAD ABOUT YOURSELF - OR THAT YOU ARE A FAILURE OR HAVE LET YOURSELF OR YOUR FAMILY DOWN: NOT AT ALL
SUM OF ALL RESPONSES TO PHQ QUESTIONS 1-9: 0
9. THOUGHTS THAT YOU WOULD BE BETTER OFF DEAD, OR OF HURTING YOURSELF: NOT AT ALL
SUM OF ALL RESPONSES TO PHQ QUESTIONS 1-9: 0

## 2025-04-10 NOTE — PROGRESS NOTES
Identified pt with two pt identifiers (name and ). Reviewed chart in preparation for visit and have obtained necessary documentation.     Miya Castillo is a 54 y.o. female Follow-up (Fu fabián shoulder pain, still having some pain in her shoulders she would like to have a inj in them for pain, she had a order for Pt but the place was to booked for her to get in so she found a place on her own and she was unhappy with how they were doing the stretches and it was not helping , having pain in the joint of her shoulders she in unable to lift her arm fully or bring her arms back without pain , she is using lidocaine patches for he arms)  .           1. Have you been to the ER, urgent care clinic since your last visit?  Hospitalized since your last visit?  no    2. Have you seen or consulted any other health care providers outside of the [facility] since your last visit?  Include any pap smears or colon screening.  no       
her hand behind her back and she has difficulty placing her hand behind her head, which is a little stiffer than it was before.  Mild crepitus noted in both shoulders consistent with bursitis.     Neurologic exam both upper extremities is normal.     Peripheral vascular both upper extremities is normal.    Assessment:     1. Subacromial bursitis of left shoulder joint    2. Rotator cuff tendinitis, left    3. Adhesive capsulitis of left shoulder    4. Acute pain of both shoulders    5. Subacromial bursitis of right shoulder joint    6. Right rotator cuff tendonitis    7. Adhesive capsulitis of right shoulder            Plan:     Left shoulder is much worse than the right.  We will start with the left shoulder and provide a Kenalog injection today and she is agreeable to this.  I told her that after the injection she needs to continue on a physical therapy program formally.    After verbal and written consent was obtained, I marked the left shoulder as the appropriate injection site.  I prepped the left posterolateral shoulder with Betadine, chlorhexidine, and alcohol and injected 10 mL 1% lidocaine followed by mixture of 4 mL of 0.75% Marcaine and 1 mL Kenalog.  No complications and no bleeding.  A Band-Aid was placed.    Continue with heat application of both shoulders, and diclofenac daily.    Patient will follow-up in 4 to 6 weeks for right shoulder injection.

## 2025-04-12 DIAGNOSIS — G47.00 INSOMNIA, UNSPECIFIED TYPE: ICD-10-CM

## 2025-04-14 ENCOUNTER — HOSPITAL ENCOUNTER (OUTPATIENT)
Facility: HOSPITAL | Age: 55
Setting detail: RECURRING SERIES
Discharge: HOME | End: 2025-04-17
Attending: ORTHOPAEDIC SURGERY
Payer: MEDICAID

## 2025-04-14 PROCEDURE — 97161 PT EVAL LOW COMPLEX 20 MIN: CPT

## 2025-04-14 PROCEDURE — 97140 MANUAL THERAPY 1/> REGIONS: CPT

## 2025-04-14 RX ORDER — ESZOPICLONE 3 MG/1
TABLET, FILM COATED ORAL
Qty: 10 TABLET | Refills: 0 | Status: SHIPPED | OUTPATIENT
Start: 2025-04-14 | End: 2025-05-14

## 2025-04-14 NOTE — TELEPHONE ENCOUNTER
PCP: Josselin Dodge MD    Last appt: [unfilled]  Future Appointments   Date Time Provider Department Center   4/14/2025  2:45 PM [facility]   5/15/2025  1:50 PM [facility]   6/18/2025  4:30 PM Josselin Dodge MD [facility] DEP       Requested Prescriptions     Pending Prescriptions Disp Refills    eszopiclone (LUNESTA) 3 MG TABS [Pharmacy Med Name: ESZOPICLONE 3 MG TABLET] 30 tablet      Sig: Take 1 tablet by mouth nightly for 30 days. Max Daily Amount: 3 mg (1 TABLET).       Prior labs and Blood pressures:  BP Readings from Last 3 Encounters:   02/20/25 (!) 137/97   12/31/24 106/76   12/13/24 122/80     Lab Results   Component Value Date/Time     04/02/2025 08:41 AM    K 4.8 04/02/2025 08:41 AM     04/02/2025 08:41 AM    CO2 22 04/02/2025 08:41 AM    BUN 21 04/02/2025 08:41 AM    GFRAA 56 03/29/2022 10:30 PM     No results found for: \"HBA1C\", \"[id number]\"  Lab Results   Component Value Date/Time    CHOL 197 04/02/2025 08:41 AM    HDL 40 04/02/2025 08:41 AM     04/02/2025 08:41 AM     03/13/2024 09:49 AM    VLDL 21 04/02/2025 08:41 AM     No results found for: \"VITD3\"    Lab Results   Component Value Date/Time    TSH 1.080 04/02/2025 08:41 AM

## 2025-04-14 NOTE — THERAPY EVALUATION
[facility]  [address], [address]  [place] [address]  Phone: [phone]    Fax: [phone]         PHYSICAL THERAPY -  EVALUATION/PLAN OF CARE NOTE (updated 3/23)      Date: 2025          Patient Name:  Miya Castillo :  1970   Medical   Diagnosis:  No admission diagnoses are documented for this encounter. Treatment Diagnosis:  M25.511  RIGHT SHOULDER PAIN and M25.512  LEFT SHOULDER PAIN    Referral Source:  Chetan Sultana MD Provider #:  [id number]                Insurance: Payor: [facility] / Plan: [employer]HC [place]ICAID [place] / Product Type: *No Product type* /      Patient  verified yes     Visit #   Current  / Total 1 20   Time   In / Out 230 315   Total Treatment Time 45   Total Timed Codes 10   1:1 Treatment Time 45      [facility] Totals Reminder:  bill using total billable   min of TIMED therapeutic procedures and modalities.   8-22 min = 1 unit; 23-37 min = 2 units; 38-52 min = 3 units;  53-67 min = 4 units; 68-82 min = 5 units           SUBJECTIVE  Pain Level (0-10 scale): 5  []constant []intermittent []improving []worsening []no change since onset    Any medication changes, allergies to medications, adverse drug reactions, diagnosis change, or new procedure performed?: [x] No    [] Yes (see summary sheet for update)  Medications: Verified on Patient Summary List    Subjective functional status/changes:     Insidious onset shoulder tightness and pain.  Can't reach up/out/behind back.  Trouble dressing.  Trouble sleeping.    Start of Care: 2025  Onset Date: 2024  Current symptoms/Complaints: as above  Mechanism of Injury: insidious  PLOF: wnl  Limitations to PLOF/Activity or Recreational Limitations: na  Work Hx: nurse nights [employer]  Living Situation: child  Mobility: wfl  Self Care: can't reach overhead or behind back so poor self care.  Previous Treatment/Compliance:

## 2025-04-15 ENCOUNTER — RESULTS FOLLOW-UP (OUTPATIENT)
Facility: CLINIC | Age: 55
End: 2025-04-15

## 2025-04-15 RX ORDER — DULOXETIN HYDROCHLORIDE 60 MG/1
60 CAPSULE, DELAYED RELEASE ORAL DAILY
Qty: 10 CAPSULE | Refills: 0 | Status: SHIPPED | OUTPATIENT
Start: 2025-04-15

## 2025-04-15 NOTE — TELEPHONE ENCOUNTER
PCP: Josselin Dodge MD    Last appt: [unfilled]  Future Appointments   Date Time Provider Department Center   4/21/2025  3:15 PM Enrrique Gonzalez Jr., PTA [facility]   4/24/2025  2:45 PM Lew Lan, PT [facility]   5/15/2025  1:50 PM Chetan Sultana MD [facility]   6/18/2025  4:30 PM Josselin Dodge MD [facility]       Requested Prescriptions     Pending Prescriptions Disp Refills    DULoxetine (CYMBALTA) 60 MG extended release capsule [Pharmacy Med Name: DULOXETINE HCL DR 60 MG CAP] 30 capsule 0     Sig: take 1 capsule by mouth once daily       Prior labs and Blood pressures:  BP Readings from Last 3 Encounters:   02/20/25 (!) 137/97   12/31/24 106/76   12/13/24 122/80     Lab Results   Component Value Date/Time     04/02/2025 08:41 AM    K 4.8 04/02/2025 08:41 AM     04/02/2025 08:41 AM    CO2 22 04/02/2025 08:41 AM    BUN 21 04/02/2025 08:41 AM    GFRAA 56 03/29/2022 10:30 PM     No results found for: \"HBA1C\", \"EOQ2PFTH\"  Lab Results   Component Value Date/Time    CHOL 197 04/02/2025 08:41 AM    HDL 40 04/02/2025 08:41 AM     04/02/2025 08:41 AM     03/13/2024 09:49 AM    VLDL 21 04/02/2025 08:41 AM     No results found for: \"VITD3\"    Lab Results   Component Value Date/Time    TSH 1.080 04/02/2025 08:41 AM

## 2025-04-16 RX ORDER — TOPIRAMATE 50 MG/1
50 TABLET, FILM COATED ORAL DAILY
Qty: 15 TABLET | Refills: 0 | Status: SHIPPED | OUTPATIENT
Start: 2025-04-16

## 2025-04-16 NOTE — TELEPHONE ENCOUNTER
PCP: Josselin Dodge MD    Last appt: [unfilled]  Future Appointments   Date Time Provider Department Center   4/21/2025  3:15 PM Enrrique Gonzalez Jr., PTA [facility]   4/24/2025  2:45 PM Lew Lan, PT [facility]   5/15/2025  1:50 PM Chetan Sultana MD [facility]   6/18/2025  4:30 PM Josselin Dodge MD [facility]       Requested Prescriptions     Pending Prescriptions Disp Refills    topiramate (TOPAMAX) 50 MG tablet [Pharmacy Med Name: TOPIRAMATE 50 MG TABLET] 30 tablet 0     Sig: take 1 tablet by mouth once daily       Prior labs and Blood pressures:  BP Readings from Last 3 Encounters:   02/20/25 (!) 137/97   12/31/24 106/76   12/13/24 122/80     Lab Results   Component Value Date/Time     04/02/2025 08:41 AM    K 4.8 04/02/2025 08:41 AM     04/02/2025 08:41 AM    CO2 22 04/02/2025 08:41 AM    BUN 21 04/02/2025 08:41 AM    GFRAA 56 03/29/2022 10:30 PM     No results found for: \"HBA1C\", \"[id number]\"  Lab Results   Component Value Date/Time    CHOL 197 04/02/2025 08:41 AM    HDL 40 04/02/2025 08:41 AM     04/02/2025 08:41 AM     03/13/2024 09:49 AM    VLDL 21 04/02/2025 08:41 AM     No results found for: \"VITD3\"    Lab Results   Component Value Date/Time    TSH 1.080 04/02/2025 08:41 AM

## 2025-04-17 NOTE — TELEPHONE ENCOUNTER
.Identified patient with two patient identifiers (name and ).      Spoke with patient about recent lab results    Patient verbally understood and will follow-up as scheduled       Tina Baker LPN

## 2025-04-17 NOTE — TELEPHONE ENCOUNTER
----- Message from Dr. Josselin Dodge MD sent at 4/15/2025  8:24 PM EDT -----  Let her know LDL cholesterol is high at 136,creatinine high at 1.24,calcium high at 10.3.  To continue current medications,low fat diet,increase water intake,avoid aleve,Advil,Naproxen. If kidney functions does not get better,will get nephrology consult

## 2025-04-21 RX ORDER — ONDANSETRON 4 MG/1
TABLET, FILM COATED ORAL
Qty: 15 TABLET | Refills: 0 | Status: SHIPPED | OUTPATIENT
Start: 2025-04-21

## 2025-04-21 RX ORDER — TOPIRAMATE 50 MG/1
50 TABLET, FILM COATED ORAL DAILY
Qty: 15 TABLET | Refills: 0 | Status: SHIPPED | OUTPATIENT
Start: 2025-04-21

## 2025-04-24 ENCOUNTER — HOSPITAL ENCOUNTER (OUTPATIENT)
Facility: HOSPITAL | Age: 55
Setting detail: RECURRING SERIES
Discharge: HOME | End: 2025-04-27
Attending: ORTHOPAEDIC SURGERY
Payer: MEDICAID

## 2025-04-24 PROCEDURE — 97140 MANUAL THERAPY 1/> REGIONS: CPT

## 2025-04-24 PROCEDURE — 97110 THERAPEUTIC EXERCISES: CPT

## 2025-04-24 NOTE — PROGRESS NOTES
PHYSICAL THERAPY - DAILY TREATMENT NOTE (updated 3/23)      Date: 2025          Patient Name:  Miya Castillo :  1970   Medical   Diagnosis:  No admission diagnoses are documented for this encounter. Treatment Diagnosis:  M25.511  RIGHT SHOULDER PAIN and M25.512  LEFT SHOULDER PAIN    Referral Source:  Chetan Sultana MD Insurance:   Payor: [facility] / Plan: [employer] [place]ID [place] / Product Type: *No Product type* /                     Patient  verified yes     Visit #   Current  / Total 2 20   Time   In / Out 245 330   Total Treatment Time 45   Total Timed Codes 40         SUBJECTIVE    Pain Level (0-10 scale): 3    Any medication changes, allergies to medications, adverse drug reactions, diagnosis change, or new procedure performed?: [x] No    [] Yes (see summary sheet for update)  Medications: Verified on Patient Summary List    Subjective functional status/changes:     Doing HEP. Seeing some improvement.    OBJECTIVE      Therapeutic Procedures:  Tx Min Billable or 1:1 Min (if diff from Tx Min) Procedure, Rationale, Specifics   10  34662 Manual Therapy (timed):  decrease pain and increase ROM to improve patient's ability to progress to PLOF and address remaining functional goals.  The manual therapy interventions were performed at a separate and distinct time from the therapeutic activities interventions . (see flow sheet as applicable)     Details if applicable:     30  63023 Therapeutic Exercise (timed):  increase ROM, strength, coordination, balance, and proprioception to improve patient's ability to progress to PLOF and address remaining functional goals. (see flow sheet as applicable)     Details if applicable:           Details if applicable:           Details if applicable:            Details if applicable:     40     Total Total     [x]  Patient Education billed concurrently with other procedures   [x] Review HEP  p/o, dps, behind back  []

## 2025-04-25 DIAGNOSIS — G47.00 INSOMNIA, UNSPECIFIED TYPE: ICD-10-CM

## 2025-04-25 RX ORDER — ESZOPICLONE 3 MG/1
TABLET, FILM COATED ORAL
Qty: 10 TABLET | Refills: 0 | Status: SHIPPED | OUTPATIENT
Start: 2025-04-25 | End: 2025-05-05

## 2025-04-25 RX ORDER — DULOXETIN HYDROCHLORIDE 60 MG/1
60 CAPSULE, DELAYED RELEASE ORAL DAILY
Qty: 10 CAPSULE | Refills: 0 | Status: SHIPPED | OUTPATIENT
Start: 2025-04-25

## 2025-04-26 DIAGNOSIS — G47.00 INSOMNIA, UNSPECIFIED TYPE: ICD-10-CM

## 2025-04-28 RX ORDER — ESZOPICLONE 3 MG/1
3 TABLET, FILM COATED ORAL NIGHTLY
Qty: 10 TABLET | Refills: 0 | Status: SHIPPED | OUTPATIENT
Start: 2025-04-28 | End: 2025-05-08

## 2025-04-30 ENCOUNTER — APPOINTMENT (OUTPATIENT)
Facility: HOSPITAL | Age: 55
End: 2025-04-30
Attending: ORTHOPAEDIC SURGERY
Payer: MEDICAID

## 2025-05-08 RX ORDER — ONDANSETRON 4 MG/1
TABLET, FILM COATED ORAL
Qty: 30 TABLET | Refills: 0 | Status: SHIPPED | OUTPATIENT
Start: 2025-05-08

## 2025-05-09 ENCOUNTER — TELEPHONE (OUTPATIENT)
Facility: CLINIC | Age: 55
End: 2025-05-09

## 2025-05-09 ENCOUNTER — HOSPITAL ENCOUNTER (OUTPATIENT)
Facility: HOSPITAL | Age: 55
Setting detail: RECURRING SERIES
End: 2025-05-09
Attending: ORTHOPAEDIC SURGERY
Payer: MEDICAID

## 2025-05-12 RX ORDER — DULOXETIN HYDROCHLORIDE 60 MG/1
60 CAPSULE, DELAYED RELEASE ORAL DAILY
Qty: 10 CAPSULE | Refills: 0 | Status: SHIPPED | OUTPATIENT
Start: 2025-05-12 | End: 2025-05-13

## 2025-05-13 RX ORDER — DULOXETIN HYDROCHLORIDE 60 MG/1
60 CAPSULE, DELAYED RELEASE ORAL DAILY
Qty: 30 CAPSULE | Refills: 0 | Status: SHIPPED | OUTPATIENT
Start: 2025-05-13

## 2025-05-13 RX ORDER — ERGOCALCIFEROL 1.25 MG/1
CAPSULE, LIQUID FILLED ORAL
Qty: 4 CAPSULE | Refills: 0 | Status: SHIPPED | OUTPATIENT
Start: 2025-05-13

## 2025-05-14 ENCOUNTER — HOSPITAL ENCOUNTER (OUTPATIENT)
Facility: HOSPITAL | Age: 55
Setting detail: RECURRING SERIES
Discharge: HOME | End: 2025-05-17
Attending: ORTHOPAEDIC SURGERY
Payer: MEDICAID

## 2025-05-14 PROCEDURE — 97110 THERAPEUTIC EXERCISES: CPT

## 2025-05-14 PROCEDURE — 97140 MANUAL THERAPY 1/> REGIONS: CPT

## 2025-05-14 NOTE — PROGRESS NOTES
PHYSICAL THERAPY - DAILY TREATMENT NOTE (updated 3/23)      Date: 2025          Patient Name:  Miya Castillo :  1970   Medical   Diagnosis:  Bursitis of left shoulder [M75.52]  Pain in right shoulder [M25.511]  Pain in left shoulder [M25.512]  Other shoulder lesions, left shoulder [M75.82] Treatment Diagnosis:  M25.511  RIGHT SHOULDER PAIN and M25.512  LEFT SHOULDER PAIN    Referral Source:  Chetan Sultana MD Insurance:   Payor: [facility] / Plan: [employer] [place]ID [place] / Product Type: *No Product type* /                     Patient  verified yes     Visit #   Current  / Total 3 20   Time   In / Out 930 1010   Total Treatment Time 40   Total Timed Codes 38         SUBJECTIVE    Pain Level (0-10 scale): 3    Any medication changes, allergies to medications, adverse drug reactions, diagnosis change, or new procedure performed?: [x] No    [] Yes (see summary sheet for update)  Medications: Verified on Patient Summary List    Subjective functional status/changes:     Doing HEP. No changes with symptoms and sleep disturbance.    OBJECTIVE      Therapeutic Procedures:  Tx Min Billable or 1:1 Min (if diff from Tx Min) Procedure, Rationale, Specifics   10  36035 Manual Therapy (timed):  decrease pain and increase ROM to improve patient's ability to progress to PLOF and address remaining functional goals.  The manual therapy interventions were performed at a separate and distinct time from the therapeutic activities interventions . (see flow sheet as applicable)     Details if applicable:     28  39375 Therapeutic Exercise (timed):  increase ROM, strength, coordination, balance, and proprioception to improve patient's ability to progress to PLOF and address remaining functional goals. (see flow sheet as applicable)     Details if applicable:           Details if applicable:           Details if applicable:            Details if applicable:     40     Total Total     [x]

## 2025-05-14 NOTE — PROGRESS NOTES
[facility]  [address], [address]  [place] [address]  Phone: [phone]    Fax: [phone]    PHYSICAL THERAPY PROGRESS NOTE  Patient Name:  Miya Castillo :  1970   Treatment/Medical Diagnosis: Bursitis of left shoulder [M75.52]  Pain in right shoulder [M25.511]  Pain in left shoulder [M25.512]  Other shoulder lesions, left shoulder [M75.82]   Referral Source:  Chetan Sultana MD     Date of Initial Visit:  25 Attended Visits:  3 Missed Visits:  2     SUMMARY OF TREATMENT/ASSESSMENT:  Ms. Castillo has attended   3  PT visits since Eval with minimal progress in ROM and functional progression at home and work.   She continues to modify her ADLs in order to dress and shower.   Ms Castillo has  decreased lifting pts at work to avoid pain.   She has tolerated TE well with minimal increase in pain.    CURRENT STATUS/GOALS    Restore arom and prom both shoulders to wfl   Status at last Eval/Progress Note: not MET  Current Status: Progressing 25  Goal Met?  no    2.  Restore to reaching behind back, grooming, dressing, lifting, carrying, reaching wnl   Status at last Eval/Progress Note: Not MET  Current Status: Progressing 25  Goal Met?  no        RECOMMENDATIONS FOR SKILLED THERAPY  Patient will continue to benefit from skilled PT / OT services to modify and progress therapeutic interventions to address functional deficits and attain remaining goals.           GARY ACUNA, PTA       2025       10:26 AM    If you have any questions/comments please contact us directly at [phone].   Thank you for allowing us to assist in the care of your patient.

## 2025-05-15 RX ORDER — CARVEDILOL 12.5 MG/1
12.5 TABLET ORAL 2 TIMES DAILY WITH MEALS
Qty: 60 TABLET | Refills: 0 | Status: SHIPPED | OUTPATIENT
Start: 2025-05-15 | End: 2025-05-17

## 2025-05-16 ENCOUNTER — APPOINTMENT (OUTPATIENT)
Facility: HOSPITAL | Age: 55
End: 2025-05-16
Attending: ORTHOPAEDIC SURGERY
Payer: MEDICAID

## 2025-05-16 RX ORDER — TOPIRAMATE 50 MG/1
50 TABLET, FILM COATED ORAL DAILY
Qty: 15 TABLET | Refills: 0 | Status: SHIPPED | OUTPATIENT
Start: 2025-05-16

## 2025-05-17 RX ORDER — CARVEDILOL 12.5 MG/1
12.5 TABLET ORAL 2 TIMES DAILY WITH MEALS
Qty: 60 TABLET | Refills: 0 | Status: SHIPPED | OUTPATIENT
Start: 2025-05-17

## 2025-05-21 ENCOUNTER — APPOINTMENT (OUTPATIENT)
Facility: HOSPITAL | Age: 55
End: 2025-05-21
Attending: ORTHOPAEDIC SURGERY
Payer: MEDICAID

## 2025-05-24 RX ORDER — TOPIRAMATE 50 MG/1
50 TABLET, FILM COATED ORAL DAILY
Qty: 15 TABLET | Refills: 0 | Status: SHIPPED | OUTPATIENT
Start: 2025-05-24

## 2025-05-26 RX ORDER — CYCLOBENZAPRINE HCL 10 MG
10 TABLET ORAL 3 TIMES DAILY PRN
Qty: 30 TABLET | Refills: 0 | Status: SHIPPED | OUTPATIENT
Start: 2025-05-26

## 2025-05-27 RX ORDER — CARVEDILOL 12.5 MG/1
12.5 TABLET ORAL 2 TIMES DAILY WITH MEALS
Qty: 30 TABLET | Refills: 0 | Status: SHIPPED | OUTPATIENT
Start: 2025-05-27

## 2025-05-30 ENCOUNTER — TELEPHONE (OUTPATIENT)
Facility: CLINIC | Age: 55
End: 2025-05-30

## 2025-06-03 RX ORDER — ONDANSETRON 4 MG/1
TABLET, FILM COATED ORAL
Qty: 10 TABLET | Refills: 0 | Status: SHIPPED | OUTPATIENT
Start: 2025-06-03

## 2025-06-09 DIAGNOSIS — G47.00 INSOMNIA, UNSPECIFIED TYPE: ICD-10-CM

## 2025-06-11 RX ORDER — ERGOCALCIFEROL 1.25 MG/1
CAPSULE, LIQUID FILLED ORAL
Qty: 2 CAPSULE | Refills: 0 | Status: SHIPPED | OUTPATIENT
Start: 2025-06-11

## 2025-06-16 DIAGNOSIS — G47.00 INSOMNIA, UNSPECIFIED TYPE: Primary | ICD-10-CM

## 2025-06-16 RX ORDER — ESZOPICLONE 3 MG/1
3 TABLET, FILM COATED ORAL NIGHTLY PRN
Qty: 7 TABLET | Refills: 0 | Status: SHIPPED | OUTPATIENT
Start: 2025-06-16 | End: 2025-06-18 | Stop reason: SDUPTHER

## 2025-06-18 ENCOUNTER — OFFICE VISIT (OUTPATIENT)
Facility: CLINIC | Age: 55
End: 2025-06-18
Payer: MEDICAID

## 2025-06-18 VITALS
BODY MASS INDEX: 42.8 KG/M2 | WEIGHT: 289 LBS | DIASTOLIC BLOOD PRESSURE: 73 MMHG | HEART RATE: 85 BPM | HEIGHT: 69 IN | SYSTOLIC BLOOD PRESSURE: 116 MMHG | OXYGEN SATURATION: 98 %

## 2025-06-18 DIAGNOSIS — E78.00 HYPERCHOLESTEROLEMIA: ICD-10-CM

## 2025-06-18 DIAGNOSIS — E83.52 HYPERCALCEMIA: ICD-10-CM

## 2025-06-18 DIAGNOSIS — Z12.31 BREAST CANCER SCREENING BY MAMMOGRAM: ICD-10-CM

## 2025-06-18 DIAGNOSIS — R00.2 PALPITATIONS: ICD-10-CM

## 2025-06-18 DIAGNOSIS — I10 ESSENTIAL HYPERTENSION, BENIGN: ICD-10-CM

## 2025-06-18 DIAGNOSIS — G43.009 MIGRAINE WITHOUT AURA AND WITHOUT STATUS MIGRAINOSUS, NOT INTRACTABLE: ICD-10-CM

## 2025-06-18 DIAGNOSIS — R79.89 ELEVATED SERUM CREATININE: ICD-10-CM

## 2025-06-18 DIAGNOSIS — I71.21 ANEURYSM OF ASCENDING AORTA WITHOUT RUPTURE: ICD-10-CM

## 2025-06-18 DIAGNOSIS — G47.00 INSOMNIA, UNSPECIFIED TYPE: ICD-10-CM

## 2025-06-18 DIAGNOSIS — R00.2 PALPITATIONS: Primary | ICD-10-CM

## 2025-06-18 PROCEDURE — 99215 OFFICE O/P EST HI 40 MIN: CPT | Performed by: INTERNAL MEDICINE

## 2025-06-18 PROCEDURE — 3074F SYST BP LT 130 MM HG: CPT | Performed by: INTERNAL MEDICINE

## 2025-06-18 PROCEDURE — 3078F DIAST BP <80 MM HG: CPT | Performed by: INTERNAL MEDICINE

## 2025-06-18 RX ORDER — TOPIRAMATE 50 MG/1
50 TABLET, FILM COATED ORAL DAILY
Qty: 90 TABLET | Refills: 0 | Status: SHIPPED | OUTPATIENT
Start: 2025-06-18

## 2025-06-18 RX ORDER — DULOXETIN HYDROCHLORIDE 60 MG/1
60 CAPSULE, DELAYED RELEASE ORAL DAILY
Qty: 90 CAPSULE | Refills: 0 | Status: SHIPPED | OUTPATIENT
Start: 2025-06-18

## 2025-06-18 RX ORDER — CARVEDILOL 12.5 MG/1
12.5 TABLET ORAL 2 TIMES DAILY WITH MEALS
Qty: 180 TABLET | Refills: 1 | Status: SHIPPED | OUTPATIENT
Start: 2025-06-18

## 2025-06-18 RX ORDER — VALACYCLOVIR HYDROCHLORIDE 500 MG/1
500 TABLET, FILM COATED ORAL DAILY
Qty: 90 TABLET | Refills: 0 | Status: SHIPPED | OUTPATIENT
Start: 2025-06-18

## 2025-06-18 RX ORDER — ESZOPICLONE 3 MG/1
3 TABLET, FILM COATED ORAL NIGHTLY PRN
Qty: 30 TABLET | Refills: 2 | Status: SHIPPED | OUTPATIENT
Start: 2025-06-18 | End: 2025-09-16

## 2025-06-18 RX ORDER — FLUTICASONE PROPIONATE 50 MCG
2 SPRAY, SUSPENSION (ML) NASAL DAILY
Qty: 16 G | Refills: 1 | Status: SHIPPED | OUTPATIENT
Start: 2025-06-18

## 2025-06-18 RX ORDER — CYCLOBENZAPRINE HCL 10 MG
10 TABLET ORAL 3 TIMES DAILY PRN
Qty: 30 TABLET | Refills: 0 | Status: SHIPPED | OUTPATIENT
Start: 2025-06-18

## 2025-06-18 RX ORDER — RIZATRIPTAN BENZOATE 10 MG/1
TABLET ORAL
Qty: 28 TABLET | Refills: 1 | Status: SHIPPED | OUTPATIENT
Start: 2025-06-18

## 2025-06-18 RX ORDER — LEVOTHYROXINE SODIUM 88 UG/1
88 TABLET ORAL DAILY
Qty: 100 TABLET | Refills: 0 | Status: SHIPPED | OUTPATIENT
Start: 2025-06-18

## 2025-06-18 ASSESSMENT — ENCOUNTER SYMPTOMS
VOMITING: 0
COUGH: 0
NAUSEA: 0
ABDOMINAL PAIN: 0
DIARRHEA: 0
SHORTNESS OF BREATH: 0

## 2025-06-18 NOTE — PROGRESS NOTES
.  Chief Complaint   Patient presents with    Follow-up    Medication Refill    Discuss Labs     Have you been to the ER, urgent care clinic since your last visit?  Hospitalized since your last visit?   YES - When: approximately 3 months ago.  Where and Why: [facility] for abdominal pain and constipation.    Have you seen or consulted any other health care providers outside our system since your last visit?   NO    ./73 (BP Site: Right Upper Arm, Patient Position: Sitting, BP Cuff Size: Large Adult)   Pulse 85   Ht 1.753 m (5' 9\")   Wt 131.1 kg (289 lb)   SpO2 98%   BMI 42.68 kg/m²

## 2025-06-19 RX ORDER — OMEPRAZOLE 20 MG/1
20 CAPSULE, DELAYED RELEASE ORAL
Qty: 90 CAPSULE | Refills: 0 | Status: SHIPPED | OUTPATIENT
Start: 2025-06-19

## 2025-06-19 RX ORDER — ESZOPICLONE 3 MG/1
TABLET, FILM COATED ORAL
Qty: 10 TABLET | OUTPATIENT
Start: 2025-06-19

## 2025-06-19 NOTE — PROGRESS NOTES
[facility]  [address], [place] [address]  Phone: [phone]      Miya Castillo (: 1970) is a 54 y.o. female, established patient, here for evaluation of the following chief complaint(s):  Follow-up, Medication Refill, and Discuss Labs     SUBJECTIVE/OBJECTIVE:  History of Present Illness  The patient is a 54-year-old female with a past medical history of hypertension, hypothyroidism, hypercholesterolemia, depression, Migraine,aortic aneurysm and insomnia, who is seen today for follow-up of blood pressure and refill of medications. Her last office visit was in 2024.    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 These symptoms have necessitated periods of rest and have resulted in missed workdays. On 2025 at 3:11 PM, she documented her blood pressure as 135/100 with a pulse of 105, accompanied by sweating and shakiness. She also reports chest pain that radiates to her back, which has been present for several weeks. She has not sought emergency care for this but has used a 6-lead EKG device, which indicated a normal sinus rhythm. She has a history of thoracic aortic aneurysm and is scheduled for a CT scan and echocardiogram on 2025. Her last consultation with a cardiologist was some time ago.     She experiences palpitations upon standing, which are accompanied by dizziness, lightheadedness, and shortness of breath. She occasionally uses a shower chair to prevent falls. She does not experience lightheadedness when sitting or lying down. She reports no leg swelling or diarrhea but does report constipation. She has previously worn support stockings and has undergone a Holter monitor test. She has been diagnosed with stage C heart failure by her thoracic surgeon and has not seen her cardiologist or

## 2025-06-20 ENCOUNTER — TELEPHONE (OUTPATIENT)
Age: 55
End: 2025-06-20

## 2025-06-20 RX ORDER — ONDANSETRON 4 MG/1
TABLET, FILM COATED ORAL
Qty: 60 TABLET | Refills: 0 | Status: SHIPPED | OUTPATIENT
Start: 2025-06-20

## 2025-06-27 LAB
ALBUMIN SERPL-MCNC: 4.3 G/DL (ref 3.8–4.9)
ALP SERPL-CCNC: 72 IU/L (ref 44–121)
ALT SERPL-CCNC: 22 IU/L (ref 0–32)
AST SERPL-CCNC: 17 IU/L (ref 0–40)
BASOPHILS # BLD AUTO: 0.1 X10E3/UL (ref 0–0.2)
BASOPHILS NFR BLD AUTO: 1 %
BILIRUB SERPL-MCNC: 0.4 MG/DL (ref 0–1.2)
BUN SERPL-MCNC: 19 MG/DL (ref 6–24)
BUN/CREAT SERPL: 15 (ref 9–23)
CALCIUM SERPL-MCNC: 10.1 MG/DL (ref 8.7–10.2)
CHLORIDE SERPL-SCNC: 103 MMOL/L (ref 96–106)
CO2 SERPL-SCNC: 22 MMOL/L (ref 20–29)
CREAT SERPL-MCNC: 1.3 MG/DL (ref 0.57–1)
EGFRCR SERPLBLD CKD-EPI 2021: 49 ML/MIN/1.73
EOSINOPHIL # BLD AUTO: 0.3 X10E3/UL (ref 0–0.4)
EOSINOPHIL NFR BLD AUTO: 3 %
ERYTHROCYTE [DISTWIDTH] IN BLOOD BY AUTOMATED COUNT: 12.4 % (ref 11.7–15.4)
GLOBULIN SER CALC-MCNC: 2.6 G/DL (ref 1.5–4.5)
GLUCOSE SERPL-MCNC: 83 MG/DL (ref 70–99)
HCT VFR BLD AUTO: 45.8 % (ref 34–46.6)
HGB BLD-MCNC: 14.3 G/DL (ref 11.1–15.9)
IMM GRANULOCYTES # BLD AUTO: 0 X10E3/UL (ref 0–0.1)
IMM GRANULOCYTES NFR BLD AUTO: 0 %
LYMPHOCYTES # BLD AUTO: 3.8 X10E3/UL (ref 0.7–3.1)
LYMPHOCYTES NFR BLD AUTO: 37 %
MCH RBC QN AUTO: 29.5 PG (ref 26.6–33)
MCHC RBC AUTO-ENTMCNC: 31.2 G/DL (ref 31.5–35.7)
MCV RBC AUTO: 95 FL (ref 79–97)
MONOCYTES # BLD AUTO: 0.5 X10E3/UL (ref 0.1–0.9)
MONOCYTES NFR BLD AUTO: 5 %
NEUTROPHILS # BLD AUTO: 5.6 X10E3/UL (ref 1.4–7)
NEUTROPHILS NFR BLD AUTO: 54 %
PLATELET # BLD AUTO: 322 X10E3/UL (ref 150–450)
POTASSIUM SERPL-SCNC: 4.8 MMOL/L (ref 3.5–5.2)
PROT SERPL-MCNC: 6.9 G/DL (ref 6–8.5)
RBC # BLD AUTO: 4.84 X10E6/UL (ref 3.77–5.28)
SODIUM SERPL-SCNC: 139 MMOL/L (ref 134–144)
WBC # BLD AUTO: 10.3 X10E3/UL (ref 3.4–10.8)

## 2025-06-28 LAB — PTH-INTACT SERPL-MCNC: 17 PG/ML (ref 15–65)

## 2025-06-29 ENCOUNTER — RESULTS FOLLOW-UP (OUTPATIENT)
Facility: CLINIC | Age: 55
End: 2025-06-29

## 2025-06-30 DIAGNOSIS — Z56.9 EMPLOYMENT PROBLEM: ICD-10-CM

## 2025-06-30 DIAGNOSIS — Z11.3 SCREEN FOR STD (SEXUALLY TRANSMITTED DISEASE): ICD-10-CM

## 2025-06-30 RX ORDER — LIDOCAINE 50 MG/G
1 PATCH TOPICAL DAILY
Qty: 30 PATCH | Refills: 0 | Status: SHIPPED | OUTPATIENT
Start: 2025-06-30

## 2025-06-30 SDOH — ECONOMIC STABILITY - INCOME SECURITY: UNSPECIFIED PROBLEMS RELATED TO EMPLOYMENT: Z56.9

## 2025-07-02 LAB — 5OH-INDOLEACETATE SERPL-MCNC: 13 NG/ML

## 2025-07-03 ENCOUNTER — HOSPITAL ENCOUNTER (OUTPATIENT)
Facility: HOSPITAL | Age: 55
Discharge: HOME | End: 2025-07-03
Payer: MEDICAID

## 2025-07-03 ENCOUNTER — HOSPITAL ENCOUNTER (OUTPATIENT)
Facility: HOSPITAL | Age: 55
Discharge: HOME | End: 2025-07-05
Payer: MEDICAID

## 2025-07-03 DIAGNOSIS — I71.21 ANEURYSM OF ASCENDING AORTA WITHOUT RUPTURE: ICD-10-CM

## 2025-07-03 PROCEDURE — 93306 TTE W/DOPPLER COMPLETE: CPT

## 2025-07-03 PROCEDURE — 71275 CT ANGIOGRAPHY CHEST: CPT

## 2025-07-03 PROCEDURE — 6360000004 HC RX CONTRAST MEDICATION

## 2025-07-03 RX ORDER — IOPAMIDOL 755 MG/ML
100 INJECTION, SOLUTION INTRAVASCULAR
Status: COMPLETED | OUTPATIENT
Start: 2025-07-03 | End: 2025-07-03

## 2025-07-03 RX ADMIN — IOPAMIDOL 100 ML: 755 INJECTION, SOLUTION INTRAVENOUS at 14:57

## 2025-07-04 LAB
ECHO AO ASC DIAM: 4.4 CM
ECHO AO ROOT DIAM: 3.2 CM
ECHO AR MAX VEL PISA: 4.1 M/S
ECHO AV AREA PEAK VELOCITY: 1.4 CM2
ECHO AV AREA VTI: 1.4 CM2
ECHO AV MEAN GRADIENT: 18 MMHG
ECHO AV MEAN VELOCITY: 2 M/S
ECHO AV PEAK GRADIENT: 31 MMHG
ECHO AV PEAK VELOCITY: 2.8 M/S
ECHO AV REGURGITANT PHT: 536 MS
ECHO AV VELOCITY RATIO: 0.39
ECHO AV VTI: 59.4 CM
ECHO EST RA PRESSURE: 3 MMHG
ECHO IVC EXP: 1.5 CM
ECHO LA AREA 4C: 17.9 CM2
ECHO LA DIAMETER: 3 CM
ECHO LA MAJOR AXIS: 6.1 CM
ECHO LA TO AORTIC ROOT RATIO: 0.94
ECHO LA VOL MOD A4C: 43 ML (ref 22–52)
ECHO LV E' LATERAL VELOCITY: 12.9 CM/S
ECHO LV E' SEPTAL VELOCITY: 10.3 CM/S
ECHO LV EDV A2C: 79 ML
ECHO LV EDV A4C: 66 ML
ECHO LV EF PHYSICIAN: 60 %
ECHO LV EJECTION FRACTION A2C: 73 %
ECHO LV EJECTION FRACTION A4C: 73 %
ECHO LV EJECTION FRACTION BIPLANE: 73 % (ref 55–100)
ECHO LV ESV A2C: 22 ML
ECHO LV ESV A4C: 18 ML
ECHO LV FRACTIONAL SHORTENING: 34 % (ref 28–44)
ECHO LV INTERNAL DIMENSION DIASTOLIC: 4.1 CM (ref 3.9–5.3)
ECHO LV INTERNAL DIMENSION SYSTOLIC: 2.7 CM
ECHO LV IVSD: 1.2 CM (ref 0.6–0.9)
ECHO LV MASS 2D: 141.5 G (ref 67–162)
ECHO LV POSTERIOR WALL DIASTOLIC: 0.9 CM (ref 0.6–0.9)
ECHO LV RELATIVE WALL THICKNESS RATIO: 0.44
ECHO LVOT AREA: 3.5 CM2
ECHO LVOT AV VTI INDEX: 0.39
ECHO LVOT DIAM: 2.1 CM
ECHO LVOT MEAN GRADIENT: 3 MMHG
ECHO LVOT PEAK GRADIENT: 5 MMHG
ECHO LVOT PEAK VELOCITY: 1.1 M/S
ECHO LVOT SV: 80 ML
ECHO LVOT VTI: 23.1 CM
ECHO MV A VELOCITY: 1.07 M/S
ECHO MV AREA VTI: 2.8 CM2
ECHO MV E DECELERATION TIME (DT): 135 MS
ECHO MV E VELOCITY: 0.85 M/S
ECHO MV E/A RATIO: 0.79
ECHO MV E/E' LATERAL: 6.59
ECHO MV E/E' RATIO (AVERAGED): 7.42
ECHO MV E/E' SEPTAL: 8.25
ECHO MV LVOT VTI INDEX: 1.24
ECHO MV MAX VELOCITY: 1.1 M/S
ECHO MV MEAN GRADIENT: 3 MMHG
ECHO MV MEAN VELOCITY: 0.9 M/S
ECHO MV PEAK GRADIENT: 5 MMHG
ECHO MV VTI: 28.7 CM
ECHO PV ACCELERATION TIME (AT): 106 MS
ECHO PV MAX VELOCITY: 0.9 M/S
ECHO PV PEAK GRADIENT: 3 MMHG
ECHO RA AREA 4C: 17.4 CM2
ECHO RA VOLUME: 46 ML
ECHO RIGHT VENTRICULAR SYSTOLIC PRESSURE (RVSP): 17 MMHG
ECHO RV BASAL DIMENSION: 3.5 CM
ECHO RV FREE WALL PEAK S': 10.9 CM/S
ECHO RV TAPSE: 1.7 CM (ref 1.7–?)
ECHO TV REGURGITANT MAX VELOCITY: 1.89 M/S
ECHO TV REGURGITANT PEAK GRADIENT: 14 MMHG

## 2025-07-06 RX ORDER — ERGOCALCIFEROL 1.25 MG/1
CAPSULE, LIQUID FILLED ORAL
Qty: 2 CAPSULE | Refills: 0 | Status: SHIPPED | OUTPATIENT
Start: 2025-07-06

## 2025-07-06 RX ORDER — DULOXETIN HYDROCHLORIDE 60 MG/1
60 CAPSULE, DELAYED RELEASE ORAL DAILY
Qty: 30 CAPSULE | Refills: 0 | Status: SHIPPED | OUTPATIENT
Start: 2025-07-06

## 2025-07-09 RX ORDER — DULOXETIN HYDROCHLORIDE 60 MG/1
60 CAPSULE, DELAYED RELEASE ORAL DAILY
Qty: 90 CAPSULE | Refills: 0 | OUTPATIENT
Start: 2025-07-09

## 2025-07-12 RX ORDER — ALBUTEROL SULFATE 90 UG/1
INHALANT RESPIRATORY (INHALATION)
Qty: 18 G | Refills: 0 | Status: SHIPPED | OUTPATIENT
Start: 2025-07-12

## 2025-07-15 RX ORDER — ERGOCALCIFEROL 1.25 MG/1
CAPSULE, LIQUID FILLED ORAL
Qty: 2 CAPSULE | Refills: 0 | Status: SHIPPED | OUTPATIENT
Start: 2025-07-15

## 2025-07-20 LAB
HIV 1+2 AB+HIV1 P24 AG SERPL QL IA: NON REACTIVE
RPR SER QL: NON REACTIVE

## 2025-07-24 LAB
M TB IFN-G BLD-IMP: POSITIVE
QUANTIFERON, INCUBATION: ABNORMAL

## 2025-07-30 DIAGNOSIS — G47.00 INSOMNIA, UNSPECIFIED TYPE: ICD-10-CM

## 2025-07-30 RX ORDER — ESZOPICLONE 3 MG/1
3 TABLET, FILM COATED ORAL NIGHTLY PRN
Qty: 30 TABLET | Refills: 2 | Status: SHIPPED | OUTPATIENT
Start: 2025-07-30 | End: 2025-07-30 | Stop reason: SDUPTHER

## 2025-07-31 ENCOUNTER — INITIAL CONSULT (OUTPATIENT)
Age: 55
End: 2025-07-31
Payer: MEDICAID

## 2025-07-31 VITALS
WEIGHT: 284.8 LBS | HEART RATE: 104 BPM | BODY MASS INDEX: 42.06 KG/M2 | OXYGEN SATURATION: 97 % | DIASTOLIC BLOOD PRESSURE: 88 MMHG | TEMPERATURE: 98.9 F | SYSTOLIC BLOOD PRESSURE: 133 MMHG

## 2025-07-31 DIAGNOSIS — I71.21 ANEURYSM OF ASCENDING AORTA WITHOUT RUPTURE: Primary | ICD-10-CM

## 2025-07-31 PROCEDURE — 99205 OFFICE O/P NEW HI 60 MIN: CPT | Performed by: NURSE PRACTITIONER

## 2025-07-31 PROCEDURE — 3079F DIAST BP 80-89 MM HG: CPT | Performed by: NURSE PRACTITIONER

## 2025-07-31 PROCEDURE — 3075F SYST BP GE 130 - 139MM HG: CPT | Performed by: NURSE PRACTITIONER

## 2025-07-31 RX ORDER — ASPIRIN 81 MG
1 TABLET, DELAYED RELEASE (ENTERIC COATED) ORAL DAILY
COMMUNITY

## 2025-07-31 NOTE — PROGRESS NOTES
Cardiac Surgery   History and Physical    Subjective:      Miya Castillo is a 54 y.o. female who was referred for cardiac evaluation. The patient was initially referred in  by Dr. Bryan. She has a medical history of HTN, HLD, hypothryoid, depression, obesity, arthritis, migraines, fibromyalgia, DDD, insomnia, genital herpes.     Possible Bicuspid aortic valve. Pt reports she was told this years ago.    She has had a known ascending aortic aneurysm since .     She reports CP radiating to her back for the past 2 months. This is intermittent and not associated with activity. She also reports SOB with exertion and showering. She has palpitations.     Swallowing trouble: she has trouble swallowing meat but does not have back teeth so she has trouble chewing well.     Pt lives alone. She works part time, as a nurse. He gets around independently without assist devices. She is a former smoker (quit 26 years ago), denies alcohol, and denies illicit drug use. Pt has a significant family history of grandmother  from MI (age 80).     Her son would be able to help her after surgery.     Cardiac Testing    Cardiac catheterization: none    CTA chest: 7/3/25  FINDINGS:      THYROID: Peripherally calcified left thyroid nodule.  MEDIASTINUM: Redemonstration of enlarged anterior mediastinal lymph node  measuring 1.6 cm, unchanged since comparison exam.  DARIELA: No mass or lymphadenopathy.  THORACIC AORTA: Redemonstration of ascending aortic aneurysm measuring up to 5  cm, similar to prior exam. There is a three-vessel aortic arch.  PULMONARY ARTERY: The main pulmonary artery is of normal caliber.  HEART: Normal in size.  ESOPHAGUS: No wall thickening or dilatation.  TRACHEA/BRONCHI: Patent.  PLEURA: No effusion or pneumothorax.  LUNGS: No airspace disease. There may be a tiny subpleural nodule in the left  lateral lung on axial image 54, versus a small focus of atelectasis. This  measures approximately 3 mm.  UPPER

## 2025-07-31 NOTE — PROGRESS NOTES
Name: Miya Castillo   : 1970   Home Phone: [phone]     Reviewed record in preparation for visit and have obtained necessary documentation. Identified pt with two pt identifiers (name and ).     1. Have you been to the ER, urgent care clinic since your last visit?  Hospitalized since your last visit?No    2. Have you seen or consulted any other health care providers outside of the [facility] since your last visit?  Include any pap smears or colon screening. No      Miya Castillo is being seen for Ascending Aortic Aneurysm consult .     Patient counseled on Office contact information and instructed to follow up  with primary cardiologist. Patient given opportunity to ask questions and receive clarification.     Current Medications-Reviewed with Patient    Allergies-Reviewed with Patient        Vitals  /88   Pulse (!) 104   Temp 98.9 °F (37.2 °C)   Wt 129.2 kg (284 lb 12.8 oz)   SpO2 97%   BMI 42.06 kg/m²

## 2025-08-17 RX ORDER — CYCLOBENZAPRINE HCL 10 MG
10 TABLET ORAL 3 TIMES DAILY PRN
Qty: 30 TABLET | Refills: 0 | Status: SHIPPED | OUTPATIENT
Start: 2025-08-17

## 2025-08-26 RX ORDER — ONDANSETRON 4 MG/1
4 TABLET, FILM COATED ORAL EVERY 12 HOURS PRN
Qty: 60 TABLET | Refills: 0 | Status: SHIPPED | OUTPATIENT
Start: 2025-08-26

## 2025-08-26 RX ORDER — LIDOCAINE 50 MG/G
1 PATCH TOPICAL DAILY
Qty: 30 PATCH | Refills: 0 | Status: SHIPPED | OUTPATIENT
Start: 2025-08-26

## 2025-09-04 DIAGNOSIS — G47.00 INSOMNIA, UNSPECIFIED TYPE: ICD-10-CM

## 2025-09-04 RX ORDER — ESZOPICLONE 3 MG/1
3 TABLET, FILM COATED ORAL NIGHTLY PRN
Qty: 30 TABLET | Refills: 0 | OUTPATIENT
Start: 2025-09-04 | End: 2025-12-03

## 2025-09-04 RX ORDER — OMEPRAZOLE 20 MG/1
20 CAPSULE, DELAYED RELEASE ORAL
Qty: 90 CAPSULE | Refills: 0 | Status: SHIPPED | OUTPATIENT
Start: 2025-09-04